# Patient Record
Sex: MALE | Race: WHITE | NOT HISPANIC OR LATINO | Employment: FULL TIME | URBAN - METROPOLITAN AREA
[De-identification: names, ages, dates, MRNs, and addresses within clinical notes are randomized per-mention and may not be internally consistent; named-entity substitution may affect disease eponyms.]

---

## 2017-04-03 ENCOUNTER — HISTORICAL (OUTPATIENT)
Dept: ADMINISTRATIVE | Facility: HOSPITAL | Age: 62
End: 2017-04-03

## 2017-12-07 ENCOUNTER — OFFICE VISIT (OUTPATIENT)
Dept: FAMILY MEDICINE | Facility: CLINIC | Age: 62
End: 2017-12-07
Payer: COMMERCIAL

## 2017-12-07 VITALS
RESPIRATION RATE: 16 BRPM | DIASTOLIC BLOOD PRESSURE: 70 MMHG | HEIGHT: 69 IN | SYSTOLIC BLOOD PRESSURE: 138 MMHG | HEART RATE: 70 BPM | WEIGHT: 302.19 LBS | BODY MASS INDEX: 44.76 KG/M2

## 2017-12-07 DIAGNOSIS — I10 ESSENTIAL (PRIMARY) HYPERTENSION: Primary | ICD-10-CM

## 2017-12-07 DIAGNOSIS — R73.9 HYPERGLYCEMIA: ICD-10-CM

## 2017-12-07 DIAGNOSIS — D69.6 THROMBOCYTOPENIA: ICD-10-CM

## 2017-12-07 DIAGNOSIS — R79.89 HIGH THYROID STIMULATING HORMONE (TSH) LEVEL: ICD-10-CM

## 2017-12-07 DIAGNOSIS — Z83.3 FH: DIABETES MELLITUS: ICD-10-CM

## 2017-12-07 DIAGNOSIS — G47.33 OBSTRUCTIVE SLEEP APNEA SYNDROME: ICD-10-CM

## 2017-12-07 DIAGNOSIS — E78.5 DYSLIPIDEMIA: ICD-10-CM

## 2017-12-07 PROBLEM — Z12.5 ENCOUNTER FOR SCREENING FOR MALIGNANT NEOPLASM OF PROSTATE: Status: ACTIVE | Noted: 2017-12-07

## 2017-12-07 PROBLEM — G47.30 SLEEP APNEA: Status: ACTIVE | Noted: 2017-12-07

## 2017-12-07 PROBLEM — R60.0 EDEMA OF LOWER EXTREMITY: Status: ACTIVE | Noted: 2017-12-07

## 2017-12-07 PROBLEM — Z78.9 NON-SMOKER: Status: ACTIVE | Noted: 2017-12-07

## 2017-12-07 PROBLEM — E66.01 MORBID OBESITY: Status: ACTIVE | Noted: 2017-12-07

## 2017-12-07 PROBLEM — R17 JAUNDICE: Status: ACTIVE | Noted: 2017-12-07

## 2017-12-07 PROBLEM — Z87.09 HISTORY OF RESPIRATORY SYSTEM DISEASE: Status: ACTIVE | Noted: 2017-12-07

## 2017-12-07 PROCEDURE — 99214 OFFICE O/P EST MOD 30 MIN: CPT | Mod: ,,, | Performed by: INTERNAL MEDICINE

## 2017-12-07 RX ORDER — LANOLIN ALCOHOL/MO/W.PET/CERES
1000 CREAM (GRAM) TOPICAL DAILY
COMMUNITY

## 2017-12-07 RX ORDER — AMLODIPINE AND BENAZEPRIL HYDROCHLORIDE 5; 20 MG/1; MG/1
5-20 CAPSULE ORAL
COMMUNITY
Start: 2017-03-14 | End: 2017-12-07 | Stop reason: SDUPTHER

## 2017-12-07 RX ORDER — SIMVASTATIN 40 MG/1
40 TABLET, FILM COATED ORAL DAILY
Qty: 90 TABLET | Refills: 1 | Status: SHIPPED | OUTPATIENT
Start: 2017-12-07 | End: 2018-08-16 | Stop reason: SDUPTHER

## 2017-12-07 RX ORDER — NAPROXEN SODIUM 220 MG/1
81 TABLET, FILM COATED ORAL DAILY
COMMUNITY
End: 2021-02-01

## 2017-12-07 RX ORDER — AMLODIPINE AND BENAZEPRIL HYDROCHLORIDE 5; 20 MG/1; MG/1
1 CAPSULE ORAL DAILY
Qty: 90 CAPSULE | Refills: 1 | Status: SHIPPED | OUTPATIENT
Start: 2017-12-07 | End: 2018-08-16 | Stop reason: SDUPTHER

## 2017-12-07 RX ORDER — SIMVASTATIN 40 MG/1
40 TABLET, FILM COATED ORAL DAILY
COMMUNITY
Start: 2017-03-14 | End: 2017-12-07 | Stop reason: SDUPTHER

## 2017-12-07 NOTE — PATIENT INSTRUCTIONS
A1C  Does this test have other names?  Hemoglobin A1c; HbA1c; glycosylated hemoglobin; glycohemoglobin; Glycated hemoglobin  What is this test?  A1C is a blood test used to screen people to find out whether they have diabetes or prediabetes. It's also used in people who know they have diabetes to measure how well they are controlling their blood sugar and to guide their treatment decisions over time.  Why do I need this test?  You may need this test to check for prediabetes or diabetes. If you already know that you have diabetes or prediabetes, you may need this test to see how well you are controlling your blood sugar.  People with diabetes must track their blood sugar (glucose) levels every day to make sure they arent too high or too low. The A1C test gives results for a longer period of time. It shows whether your blood sugar has been too high on average in the previous two to three months. When blood sugar is high, more glucose builds up and sticks to your hemoglobin, a protein that carries oxygen in red blood cells. The A1C test measures the percentage of hemoglobin that is coated with blood sugar.  Depending on the type of diabetes you have, how well it's controlled, and your health care providers preferences, you may need to have the A1C test two or more times a year. The American Diabetes Association (ADA) recommends that you have an A1C test at least twice a year if you are meeting your blood sugar goals and your blood sugar is well-controlled. If you arent meeting your goals or your medication has changed lately, you should have the A1C test more often. You also may have the test when your health care provider first starts working with you to treat your diabetes.  What other tests might I have along with this test?   If your health care provider is testing you for diabetes, you may also take a fasting plasma glucose test, or FPG, or an oral glucose tolerance test, or OGTT, as part of your screening  and diagnosis. You may also be tested for sugar, ketones or protein in the urine.  What do my test results mean?  Laboratory test results may vary depending on your age, gender, health history, the method used for the test, and many other factors. If your results are different from the results suggested below, this may not mean that you have a problem. Ask your health care provider to explain what the results mean for you.   A1C is reported as a percentage:  · Normal A1C is considered to be below 5.7 percent. Results between 5.7 and 6.4 percent may mean you have prediabetes. This means you have a higher risk of developing diabetes.  · Results of 6.5 percent or higher on two separate occasions may mean that you have diabetes.  · The ADA  recommends that people with diabetes should maintain an A1C below 7 percent. The American Association of Clinical Endocrinologists recommends an A1C of 6.5 percent or less. Recommendations may vary based on the person's age, medical conditions, or other factors.   How is this test done?  The test requires a blood sample, which is drawn through a needle from a vein in your arm.   Does this test pose any risks?  Taking a blood sample with a needle carries risks that include bleeding, infection, bruising, and a sense of lightheadedness. When the needle pricks your arm, you may feel a slight stinging sensation or pain. Afterward, the site may be slightly sore.  What might affect my test results?  Your blood sugar levels usually vary throughout the day. These variations won't affect the A1C.  If you have sickle cell anemia or other blood disorders, a standard A1C test may be less useful for diagnosing or monitoring diabetes. (Your health care provider may be able to find another diabetes test that will better serve you.) In addition, the test results may be skewed if you have anemia, heavy bleeding, an iron deficiency, kidney failure, or liver disease.  How do I get ready for this  test?  You don't need any special preparation for the test.    © 3961-9313 [employer]. [address] [place] [address], [place] [address]. All rights reserved. This information is not intended as a substitute for professional medical care. Always follow your healthcare professional's instructions.

## 2017-12-08 NOTE — PROGRESS NOTES
Subjective:       Patient ID: Marcus Parisi is a 62 y.o. male.    Chief Complaint: No chief complaint on file.    Marcus is a 62-year-old female who is here to establish with a new PCP who took over for his prior physician and he has a past medical history significant for hypertension, dyslipidemia, morbid obesity and does need refills on his blood pressure medication. He believes he has some whitecoat syndrome but his blood pressure is good today. He has lost about 15 pounds in the past 6 months just by eating better choices. He does have a family history for diabetes in his fasting blood sugar has been minimally elevated previous A1c in the year was normal. Patient also had an elevated TSH slightly above the normal range and this was not yet repeated. Patient does have obstructive sleep apnea as well.      Review of Systems   Constitutional: Negative for activity change, diaphoresis, fatigue and fever.   HENT: Negative for congestion, ear pain, postnasal drip, sinus pressure, sore throat and trouble swallowing.    Eyes: Negative for pain.   Respiratory: Negative for cough, chest tightness, shortness of breath and wheezing.    Cardiovascular: Negative for chest pain, palpitations and leg swelling.   Gastrointestinal: Negative for abdominal distention, abdominal pain, blood in stool, constipation and diarrhea.   Endocrine: Negative for cold intolerance and heat intolerance.   Genitourinary: Negative for decreased urine volume, difficulty urinating, dysuria, flank pain, frequency and hematuria.   Musculoskeletal: Negative for arthralgias, back pain, joint swelling, myalgias and neck pain.   Skin: Negative for pallor, rash and wound.   Neurological: Negative for tremors, syncope, weakness, light-headedness, numbness and headaches.   Hematological: Negative for adenopathy.   Psychiatric/Behavioral: Negative for confusion, decreased concentration, hallucinations, self-injury, sleep disturbance and suicidal ideas. The  patient is not nervous/anxious.        Past Medical History:   Diagnosis Date    Allergy     Hyperlipidemia     Hypertension        Past Surgical History:   Procedure Laterality Date    carpel tunnel      bilaterally       Family History   Problem Relation Age of Onset    Cancer Mother     Asthma Mother     Diabetes Mother     Heart disease Mother     Hypertension Mother     Hypertension Father     Crohn's disease Brother     Cancer Maternal Grandmother        Social History     Social History    Marital status:      Spouse name: N/A    Number of children: N/A    Years of education: N/A     Social History Main Topics    Smoking status: Former Smoker    Smokeless tobacco: Never Used      Comment: quit 40 yrs ago    Alcohol use Yes      Comment: occasional    Drug use: No    Sexual activity: Yes     Other Topics Concern    None     Social History Narrative    None       Current Outpatient Prescriptions   Medication Sig Dispense Refill    amlodipine-benazepril 5-20 mg (LOTREL) 5-20 mg per capsule Take 1 capsule by mouth once daily. 90 capsule 1    simvastatin (ZOCOR) 40 MG tablet Take 1 tablet (40 mg total) by mouth once daily. 90 tablet 1    aspirin 81 MG Chew Take by mouth.      cyanocobalamin (VITAMIN B-12) 100 MCG tablet Take by mouth.      OMEGA-3 ACID ETHYL ESTERS ORAL Take by mouth.       No current facility-administered medications for this visit.        Review of patient's allergies indicates:  No Known Allergies  Objective:      Physical Exam   Constitutional: He is oriented to person, place, and time. He appears well-developed and well-nourished. No distress.   HENT:   Head: Normocephalic and atraumatic.   Right Ear: External ear normal.   Left Ear: External ear normal.   Nose: Nose normal.   Mouth/Throat: Oropharynx is clear and moist.   Eyes: Conjunctivae and EOM are normal. Right eye exhibits no discharge. Left eye exhibits no discharge. No scleral icterus.   Neck: Normal  range of motion. Neck supple. No JVD present. No tracheal deviation present. No thyromegaly present.   Cardiovascular: Normal rate, regular rhythm, normal heart sounds and intact distal pulses.  Exam reveals no gallop.    No murmur heard.  Pulmonary/Chest: Effort normal and breath sounds normal. No respiratory distress. He has no wheezes. He has no rales.   Abdominal: Soft. Bowel sounds are normal. He exhibits no distension and no mass. There is no tenderness.   Musculoskeletal: Normal range of motion. He exhibits no edema or tenderness.   Lymphadenopathy:     He has no cervical adenopathy.   Neurological: He is alert and oriented to person, place, and time.   Skin: Skin is warm and dry. No rash noted. He is not diaphoretic. No erythema.   Psychiatric: He has a normal mood and affect. His behavior is normal. Judgment and thought content normal.     No results found for: WBC, HGB, HCT, PLT, CHOL, TRIG, HDL, LDLDIRECT, ALT, AST, NA, K, CL, CREATININE, BUN, CO2, TSH, PSA, INR, GLUF, HGBA1C, MICROALBUR  Assessment:       1. Essential (primary) hypertension    2. Dyslipidemia    3. High thyroid stimulating hormone (TSH) level    4. Hyperglycemia    5. Obstructive sleep apnea syndrome since 1995    6. Thrombocytopenia    7. FH: diabetes mellitus    8. BMI 40.0-44.9, adult        Plan:       Essential (primary) hypertension  -     Urinalysis Microscopic; Future; Expected date: 12/07/2017  -     amlodipine-benazepril 5-20 mg (LOTREL) 5-20 mg per capsule; Take 1 capsule by mouth once daily.  Dispense: 90 capsule; Refill: 1    Dyslipidemia  -     simvastatin (ZOCOR) 40 MG tablet; Take 1 tablet (40 mg total) by mouth once daily.  Dispense: 90 tablet; Refill: 1    High thyroid stimulating hormone (TSH) level  -     TSH; Future; Expected date: 12/07/2017  -     T4, free; Future; Expected date: 12/07/2017    Hyperglycemia  -     Hemoglobin A1c; Future; Expected date: 12/07/2017  -     Basic metabolic panel; Future; Expected date:  12/07/2017    Obstructive sleep apnea syndrome since 1995    Thrombocytopenia  -     CBC auto differential; Future; Expected date: 12/07/2017    FH: diabetes mellitus    BMI 40.0-44.9, adult

## 2017-12-11 RX ORDER — LEVOTHYROXINE SODIUM 25 UG/1
25 TABLET ORAL DAILY
Qty: 30 TABLET | Refills: 11 | Status: SHIPPED | OUTPATIENT
Start: 2017-12-11 | End: 2018-09-18 | Stop reason: SDUPTHER

## 2018-08-16 DIAGNOSIS — D69.6 THROMBOCYTOPENIA: Primary | ICD-10-CM

## 2018-08-16 DIAGNOSIS — I10 ESSENTIAL (PRIMARY) HYPERTENSION: ICD-10-CM

## 2018-08-16 DIAGNOSIS — E78.5 DYSLIPIDEMIA: ICD-10-CM

## 2018-08-16 DIAGNOSIS — Z12.5 SCREENING FOR PROSTATE CANCER: ICD-10-CM

## 2018-08-16 DIAGNOSIS — R73.9 HYPERGLYCEMIA: ICD-10-CM

## 2018-08-16 NOTE — TELEPHONE ENCOUNTER
Pt has appt with you on 9/11/2018.    Pt requested EXTERNAL LAB ORDERS & he will  here at the clinic. States it is cheaper to take them to a private lab???

## 2018-08-18 RX ORDER — SIMVASTATIN 40 MG/1
40 TABLET, FILM COATED ORAL DAILY
Qty: 90 TABLET | Refills: 0 | Status: SHIPPED | OUTPATIENT
Start: 2018-08-18 | End: 2018-09-18 | Stop reason: SDUPTHER

## 2018-08-18 RX ORDER — AMLODIPINE AND BENAZEPRIL HYDROCHLORIDE 5; 20 MG/1; MG/1
1 CAPSULE ORAL DAILY
Qty: 90 CAPSULE | Refills: 0 | Status: SHIPPED | OUTPATIENT
Start: 2018-08-18 | End: 2018-09-18 | Stop reason: SDUPTHER

## 2018-09-18 ENCOUNTER — OFFICE VISIT (OUTPATIENT)
Dept: FAMILY MEDICINE | Facility: CLINIC | Age: 63
End: 2018-09-18
Payer: COMMERCIAL

## 2018-09-18 VITALS
SYSTOLIC BLOOD PRESSURE: 130 MMHG | BODY MASS INDEX: 44.88 KG/M2 | RESPIRATION RATE: 16 BRPM | DIASTOLIC BLOOD PRESSURE: 68 MMHG | HEART RATE: 74 BPM | HEIGHT: 69 IN | WEIGHT: 303 LBS | OXYGEN SATURATION: 96 %

## 2018-09-18 DIAGNOSIS — Z83.3 FH: DIABETES MELLITUS: ICD-10-CM

## 2018-09-18 DIAGNOSIS — E78.5 DYSLIPIDEMIA: ICD-10-CM

## 2018-09-18 DIAGNOSIS — R79.89 HIGH THYROID STIMULATING HORMONE (TSH) LEVEL: ICD-10-CM

## 2018-09-18 DIAGNOSIS — I10 ESSENTIAL (PRIMARY) HYPERTENSION: Primary | ICD-10-CM

## 2018-09-18 PROCEDURE — 99213 OFFICE O/P EST LOW 20 MIN: CPT | Mod: ,,, | Performed by: INTERNAL MEDICINE

## 2018-09-18 RX ORDER — AMLODIPINE AND BENAZEPRIL HYDROCHLORIDE 5; 20 MG/1; MG/1
1 CAPSULE ORAL DAILY
Qty: 90 CAPSULE | Refills: 3 | Status: SHIPPED | OUTPATIENT
Start: 2018-09-18 | End: 2019-09-30 | Stop reason: SDUPTHER

## 2018-09-18 RX ORDER — SIMVASTATIN 40 MG/1
40 TABLET, FILM COATED ORAL DAILY
Qty: 90 TABLET | Refills: 3 | Status: SHIPPED | OUTPATIENT
Start: 2018-09-18 | End: 2019-09-30 | Stop reason: SDUPTHER

## 2018-09-18 RX ORDER — LEVOTHYROXINE SODIUM 25 UG/1
25 TABLET ORAL DAILY
Qty: 90 TABLET | Refills: 3 | Status: SHIPPED | OUTPATIENT
Start: 2018-09-18 | End: 2019-04-25

## 2018-09-18 RX ORDER — CHOLECALCIFEROL (VITAMIN D3) 25 MCG
1000 TABLET ORAL DAILY
COMMUNITY
End: 2021-10-27

## 2018-09-18 NOTE — PATIENT INSTRUCTIONS
A1C  Does this test have other names?  Hemoglobin A1c; HbA1c; glycosylated hemoglobin; glycohemoglobin; Glycated hemoglobin  What is this test?  A1C is a blood test used to screen people to find out whether they have diabetes or prediabetes. It's also used in people who know they have diabetes to measure how well they are controlling their blood sugar and to guide their treatment decisions over time.  Why do I need this test?  You may need this test to check for prediabetes or diabetes. If you already know that you have diabetes or prediabetes, you may need this test to see how well you are controlling your blood sugar.  People with diabetes must track their blood sugar (glucose) levels every day to make sure they arent too high or too low. The A1C test gives results for a longer period of time. It shows whether your blood sugar has been too high on average in the previous two to three months. When blood sugar is high, more glucose builds up and sticks to your hemoglobin, a protein that carries oxygen in red blood cells. The A1C test measures the percentage of hemoglobin that is coated with blood sugar.  Depending on the type of diabetes you have, how well it's controlled, and your health care providers preferences, you may need to have the A1C test two or more times a year. The American Diabetes Association (ADA) recommends that you have an A1C test at least twice a year if you are meeting your blood sugar goals and your blood sugar is well-controlled. If you arent meeting your goals or your medication has changed lately, you should have the A1C test more often. You also may have the test when your health care provider first starts working with you to treat your diabetes.  What other tests might I have along with this test?   If your health care provider is testing you for diabetes, you may also take a fasting plasma glucose test, or FPG, or an oral glucose tolerance test, or OGTT, as part of your screening  and diagnosis. You may also be tested for sugar, ketones or protein in the urine.  What do my test results mean?  Laboratory test results may vary depending on your age, gender, health history, the method used for the test, and many other factors. If your results are different from the results suggested below, this may not mean that you have a problem. Ask your health care provider to explain what the results mean for you.   A1C is reported as a percentage:  · Normal A1C is considered to be below 5.7 percent. Results between 5.7 and 6.4 percent may mean you have prediabetes. This means you have a higher risk of developing diabetes.  · Results of 6.5 percent or higher on two separate occasions may mean that you have diabetes.  · The ADA  recommends that people with diabetes should maintain an A1C below 7 percent. The American Association of Clinical Endocrinologists recommends an A1C of 6.5 percent or less. Recommendations may vary based on the person's age, medical conditions, or other factors.   How is this test done?  The test requires a blood sample, which is drawn through a needle from a vein in your arm.   Does this test pose any risks?  Taking a blood sample with a needle carries risks that include bleeding, infection, bruising, and a sense of lightheadedness. When the needle pricks your arm, you may feel a slight stinging sensation or pain. Afterward, the site may be slightly sore.  What might affect my test results?  Your blood sugar levels usually vary throughout the day. These variations won't affect the A1C.  If you have sickle cell anemia or other blood disorders, a standard A1C test may be less useful for diagnosing or monitoring diabetes. (Your health care provider may be able to find another diabetes test that will better serve you.) In addition, the test results may be skewed if you have anemia, heavy bleeding, an iron deficiency, kidney failure, or liver disease.  How do I get ready for this  test?  You don't need any special preparation for the test.    © 3612-5820 [employer]. [address] [place] [address], [place] [address]. All rights reserved. This information is not intended as a substitute for professional medical care. Always follow your healthcare professional's instructions.

## 2018-09-19 NOTE — PROGRESS NOTES
Subjective:       Patient ID: Marcus Parisi is a 63 y.o. male.    Chief Complaint: Hypertension (lab review)    62-year-old gentleman here for a follow-up on his chronic medical conditions to include essential hypertension, dyslipidemia and subclinical hypothyroidism with a mildly elevated TSH. He did to some recent blood work which was inclusive and everything was in normal limits; however they did not do the PSA. The patient does have a family history of hyperglycemia/diabetes in his sugar was 105 on fasting. His A1c however was 5.7.      Review of Systems   Constitutional: Negative for activity change, diaphoresis, fatigue and fever.   HENT: Negative for congestion, ear pain, postnasal drip, sinus pressure, sore throat and trouble swallowing.    Eyes: Negative for pain.   Respiratory: Negative for cough, chest tightness, shortness of breath and wheezing.    Cardiovascular: Negative for chest pain, palpitations and leg swelling.   Gastrointestinal: Negative for abdominal distention, abdominal pain, blood in stool, constipation and diarrhea.   Endocrine: Negative for cold intolerance and heat intolerance.   Genitourinary: Negative for decreased urine volume, difficulty urinating, dysuria, flank pain, frequency and hematuria.   Musculoskeletal: Negative for arthralgias, back pain, joint swelling, myalgias and neck pain.   Skin: Negative for pallor, rash and wound.   Neurological: Negative for tremors, syncope, weakness, light-headedness, numbness and headaches.   Hematological: Negative for adenopathy.   Psychiatric/Behavioral: Negative for confusion, decreased concentration, hallucinations, self-injury, sleep disturbance and suicidal ideas. The patient is not nervous/anxious.        Past Medical History:   Diagnosis Date    Allergy     Hyperlipidemia     Hypertension        Past Surgical History:   Procedure Laterality Date    carpel tunnel      bilaterally       Family History   Problem Relation Age of Onset     Cancer Mother     Asthma Mother     Diabetes Mother     Heart disease Mother     Hypertension Mother     Hypertension Father     Crohn's disease Brother     Cancer Maternal Grandmother        Social History     Socioeconomic History    Marital status:      Spouse name: None    Number of children: None    Years of education: None    Highest education level: None   Social Needs    Financial resource strain: None    Food insecurity - worry: None    Food insecurity - inability: None    Transportation needs - medical: None    Transportation needs - non-medical: None   Occupational History    None   Tobacco Use    Smoking status: Former Smoker    Smokeless tobacco: Never Used    Tobacco comment: quit 40 yrs ago   Substance and Sexual Activity    Alcohol use: Yes     Comment: occasional    Drug use: No    Sexual activity: Yes   Other Topics Concern    None   Social History Narrative    None       Current Outpatient Medications   Medication Sig Dispense Refill    amlodipine-benazepril 5-20 mg (LOTREL) 5-20 mg per capsule Take 1 capsule by mouth once daily. 90 capsule 3    aspirin 81 MG Chew Take by mouth.      cyanocobalamin (VITAMIN B-12) 100 MCG tablet Take by mouth.      levothyroxine (SYNTHROID) 25 MCG tablet Take 1 tablet (25 mcg total) by mouth once daily. 90 tablet 3    OMEGA-3 ACID ETHYL ESTERS ORAL Take by mouth.      simvastatin (ZOCOR) 40 MG tablet Take 1 tablet (40 mg total) by mouth once daily. 90 tablet 3    vitamin D (VITAMIN D3) 1000 units Tab Take 1,000 Units by mouth once daily.       No current facility-administered medications for this visit.        Review of patient's allergies indicates:  No Known Allergies  Objective:      Physical Exam   Constitutional: He is oriented to person, place, and time. He appears well-developed and well-nourished. No distress.   HENT:   Head: Normocephalic and atraumatic.   Right Ear: External ear normal.   Left Ear: External ear  normal.   Nose: Nose normal.   Mouth/Throat: Oropharynx is clear and moist.   Eyes: Conjunctivae and EOM are normal. Right eye exhibits no discharge. Left eye exhibits no discharge. No scleral icterus.   Neck: Normal range of motion. Neck supple. No JVD present. No tracheal deviation present. No thyromegaly present.   Cardiovascular: Normal rate, regular rhythm, normal heart sounds and intact distal pulses. Exam reveals no gallop.   No murmur heard.  Pulmonary/Chest: Effort normal and breath sounds normal. No respiratory distress. He has no wheezes. He has no rales.   Abdominal: Soft. Bowel sounds are normal. He exhibits no distension and no mass. There is no tenderness.   Musculoskeletal: Normal range of motion. He exhibits no edema or tenderness.   Lymphadenopathy:     He has no cervical adenopathy.   Neurological: He is alert and oriented to person, place, and time.   Skin: Skin is warm and dry. No rash noted. He is not diaphoretic. No erythema.   Psychiatric: He has a normal mood and affect. His behavior is normal. Judgment and thought content normal.       Assessment:       1. Essential (primary) hypertension    2. High thyroid stimulating hormone (TSH) level    3. Dyslipidemia    4. FH: diabetes mellitus -mom - AIC 5.7    5. BMI 40.0-44.9, adult        Plan:       Essential (primary) hypertension  -     amlodipine-benazepril 5-20 mg (LOTREL) 5-20 mg per capsule; Take 1 capsule by mouth once daily.  Dispense: 90 capsule; Refill: 3    High thyroid stimulating hormone (TSH) level  -     levothyroxine (SYNTHROID) 25 MCG tablet; Take 1 tablet (25 mcg total) by mouth once daily.  Dispense: 90 tablet; Refill: 3    Dyslipidemia  -     simvastatin (ZOCOR) 40 MG tablet; Take 1 tablet (40 mg total) by mouth once daily.  Dispense: 90 tablet; Refill: 3    FH: diabetes mellitus -mom - AIC 5.7    BMI 40.0-44.9, adult -Discussed with patient diet and exercise education to assist with weight loss

## 2018-09-24 ENCOUNTER — TELEPHONE (OUTPATIENT)
Dept: FAMILY MEDICINE | Facility: CLINIC | Age: 63
End: 2018-09-24

## 2018-09-24 DIAGNOSIS — R97.20 INCREASED PROSTATE SPECIFIC ANTIGEN (PSA) VELOCITY: Primary | ICD-10-CM

## 2018-09-25 NOTE — TELEPHONE ENCOUNTER
PSA WENT FROM 1.2 TO 2.1 WHICH IS AN INCREASE IN ONE YEAR     I WILL REFER HIM TO UROLOGY AS HE NEEDS TO HAVE A GOOD PROSTATE EXAM    TARIQ ESPINOZA

## 2018-10-09 ENCOUNTER — OFFICE VISIT (OUTPATIENT)
Dept: UROLOGY | Facility: CLINIC | Age: 63
End: 2018-10-09
Payer: COMMERCIAL

## 2018-10-09 VITALS
HEART RATE: 69 BPM | HEIGHT: 69 IN | TEMPERATURE: 99 F | DIASTOLIC BLOOD PRESSURE: 78 MMHG | SYSTOLIC BLOOD PRESSURE: 133 MMHG | BODY MASS INDEX: 45.94 KG/M2 | WEIGHT: 310.19 LBS

## 2018-10-09 DIAGNOSIS — R97.20 RISING PSA LEVEL: Primary | ICD-10-CM

## 2018-10-09 LAB
BILIRUB SERPL-MCNC: NORMAL MG/DL
BLOOD URINE, POC: NORMAL
COLOR, POC UA: YELLOW
GLUCOSE UR QL STRIP: NORMAL
KETONES UR QL STRIP: NORMAL
LEUKOCYTE ESTERASE URINE, POC: NORMAL
NITRITE, POC UA: NORMAL
PH, POC UA: 5
PROTEIN, POC: NORMAL
SPECIFIC GRAVITY, POC UA: 1.02
UROBILINOGEN, POC UA: NORMAL

## 2018-10-09 PROCEDURE — 99244 OFF/OP CNSLTJ NEW/EST MOD 40: CPT | Mod: 25,S$GLB,, | Performed by: UROLOGY

## 2018-10-09 PROCEDURE — 81002 URINALYSIS NONAUTO W/O SCOPE: CPT | Mod: S$GLB,,, | Performed by: UROLOGY

## 2018-10-09 PROCEDURE — 99999 PR PBB SHADOW E&M-NEW PATIENT-LVL III: CPT | Mod: PBBFAC,,, | Performed by: UROLOGY

## 2018-10-09 NOTE — PATIENT INSTRUCTIONS
His psa is only 2.1, however in 1+years it has risen from 1.2, which is doubled. On exam he has a 20g prostate so size, nor infection, nor inflammation account for his elevated psa. He has a somewhat firm prostate but no discrete nodules.  At this point I would prefer to repeat a psa, free and total in 3 months as well as a JAHAIRA and if his JAHAIRA is more abnormal, more firm, a discrete nodule palpated, psa is higher o %free psa is low then will proceed with biopsy.  Family and pt are amenable to this.    I have routed a letter back to   for the consult on date of service 10/818/.     F/u 3 months with psa, free and total and JAHAIRA

## 2018-10-09 NOTE — LETTER
October 9, 2018      Yaquelin Ya MD  [address]  [address]  [place] [address]           [place] [facility]  [address] [place] [address] [address]  [place] [address]  Phone: [phone]  Fax: [phone]          Patient: Marcus Parisi   MR Number: [id number]   YOB: 1955   Date of Visit: 10/9/2018       Dear Dr. Yaquelin Ya:    Thank you for referring Marcus Parisi to me for evaluation. Attached you will find relevant portions of my assessment and plan of care.    If you have questions, please do not hesitate to call me. I look forward to following Marcus Parisi along with you.    Sincerely,    Delmy Hernandez MD    Enclosure  CC:  No Recipients    If you would like to receive this communication electronically, please contact [email or web address] or [phone] to request more information on [employer] Link access.    For providers and/or their staff who would like to refer a patient to [facility], please contact us through our one-stop-shop provider referral line, [facility], at [phone].    If you feel you have received this communication in error or would no longer like to receive these types of communications, please e-mail [email or web address]

## 2018-10-09 NOTE — PROGRESS NOTES
"[facility] - [place]  Staff: MD Mary    Referring provider and please cc:   PCP: [facility]    Chief Complaint: rising psa     Subjective:        HPI: Marcus Parisi is a 63 y.o. male presents with     He was referred for rising psa from his pcp. psa last year 2017 was 1.2, up to 2.1 this year. Denies any uti or bph symptoms. No family hx of prostate cancer.     18 2.1  2017  1.2    AUA SSx: 2, pleased   IIEF: 24    ECOG Status: 0    Gross Hematuria:No  STDs in past: No  Vasectomy: no    REVIEW OF SYSTEMS:  General ROS: no fevers, no chills  Psychological ROS: no depression  Endocrine ROS: no heat or cold  Respiratory ROS: no SOB  Cardiovascular ROS: no CP  Gastrointestinal ROS: no abdominal pain, no constipation, non diarrhea, on BRBPR  Musculoskeletal ROS: no muscle pain  Neurological ROS: kapil headaches  Dermatological ROS: no rashes  HEENT: no glasses, no sinus   ROS: per HPI     PMHx:  Past Medical History:   Diagnosis Date    Allergy     Hyperlipidemia     Hypertension    hypothyroidism  Kidney stones: Yes - 10 years ago, 1 stone which required stone extraction , bad experience leaked from stent  Cataracts? none    PSHx:  Past Surgical History:   Procedure Laterality Date    carpel tunnel      bilaterally   stone extraction and stent    Stents/Valves/Foreign Bodies: No  Cardiac Evaluation: No    Screening Studies  Colonoscopy: last one was 5 years ago, due for one next year     Fam Hx:   malignancies: No  . Gyn malignancies: no. Mother had "bone cancer" and  2 months after diagnosis a 72. Father  a 84 from natural causes.    kidney stones: No     Soc Hx:  Former tobacco, quit 40+years ago.  3 pk per day x 10 year  occ alcohol  Lives in [place]  :yes here with wife Ann-Marie   Children: 2   Occupation:     Allergies:  Patient has no known allergies.    Medications: reviewed   Anticoagulation: Yes - asa 81mg "     Objective:     Vitals:    10/09/18 0954   BP: 133/78   Pulse: 69   Temp: 98.5 °F (36.9 °C)         General:WDWN in NAD  Eyes: PERRLA, normal conjunctiva  Respiratory: No increased work on breathing.   Cardiovascular: No obvious extremity edema. Warm and well perfused.   GI: palpation of masses. No tenderness. No hepatosplenomegaly to palpation.  Musculoskeletal: normal range of motion of bilateral upper extremities. Normal muscle strength and tone.  Skin: no obvious rashes or lesions. No tightening of skin noted.  Neurologic: CN grossly normal. Normal sensation.   Psychiatric: awake, alert and oriented x 3. Mood and affect normal. Cooperative.    :  Inspection of anus normal  No scrotal rashes, cysts or lesions  Epididymis normal in size, no tenderness  Testes normal and size, equal size bilaterally, no masses  Urethral meatus normal without discharge  Penis is circumcised, buried    JAHAIRA: 20g gland without any discrete masses, left side however more firm, no tenderness. SV not palpable. Normal sphincter tone. No hemhorroids.  No bilateral inguinal hernias noted       LABS REVIEW:  Urinalysis void: 1.020/5/remainder neg  Urine history: no other     No results found for: WBC, HGB, HCT, MCV, PLT  BMP  No results found for: NA, K, CL, CO2, BUN, CREATININE, CALCIUM, ANIONGAP, ESTGFRAFRICA, EGFRNONAA    No results found for: PSA  Testosterone: No results found for: TESTOSTERONE    PATHOLOGY REVIEW:  none    RADIOGRAPHIC REVIEW:  Us abdomen 4/3/17  The right kidney is normal in size and echotexture, without echogenic calculi  or hydronephrosis. There is no ascites.      Assessment:       1. Rising PSA level          Plan:     His psa is only 2.1, however in 1+years it has risen from 1.2, which is doubled. On exam he has a 20g prostate so size, nor infection, nor inflammation account for his elevated psa. He has a somewhat firm prostate but no discrete nodules.  At this point I would prefer to repeat a psa, free and  total in 3 months as well as a JAHAIRA and if his JAHAIRA is more abnormal, more firm, a discrete nodule palpated, psa is higher o %free psa is low then will proceed with biopsy.  Family and pt are amenable to this.    I have routed a letter back to   for the consult on date of service 10/818/.     F/u 3 months with psa, free and total and JAHAIRA     Delmy Hernandez MD

## 2019-01-14 ENCOUNTER — TELEPHONE (OUTPATIENT)
Dept: UROLOGY | Facility: CLINIC | Age: 64
End: 2019-01-14

## 2019-01-14 NOTE — TELEPHONE ENCOUNTER
----- Message from Eli Spencer sent at 1/14/2019  9:41 AM CST -----  Contact: wife Lisa [phone]  Patient's wife called  And asked  If he can have the PSA test done today she states [facility] has [facility] done at [facility] on [address] phone is  [phone] please place the orders in today. Please call the patient wife back to confirm.

## 2019-01-15 ENCOUNTER — OFFICE VISIT (OUTPATIENT)
Dept: UROLOGY | Facility: CLINIC | Age: 64
End: 2019-01-15
Payer: COMMERCIAL

## 2019-01-15 ENCOUNTER — APPOINTMENT (OUTPATIENT)
Dept: LAB | Facility: HOSPITAL | Age: 64
End: 2019-01-15
Attending: UROLOGY
Payer: COMMERCIAL

## 2019-01-15 ENCOUNTER — TELEPHONE (OUTPATIENT)
Dept: UROLOGY | Facility: CLINIC | Age: 64
End: 2019-01-15

## 2019-01-15 VITALS
HEART RATE: 79 BPM | BODY MASS INDEX: 46.09 KG/M2 | HEIGHT: 69 IN | DIASTOLIC BLOOD PRESSURE: 84 MMHG | WEIGHT: 311.19 LBS | SYSTOLIC BLOOD PRESSURE: 149 MMHG

## 2019-01-15 DIAGNOSIS — R97.20 RISING PSA LEVEL: Primary | ICD-10-CM

## 2019-01-15 DIAGNOSIS — R31.29 MICROHEMATURIA: ICD-10-CM

## 2019-01-15 DIAGNOSIS — N40.2 PROSTATE NODULE: ICD-10-CM

## 2019-01-15 LAB
AMORPH CRY URNS QL MICRO: NORMAL
BACTERIA #/AREA URNS HPF: NORMAL /HPF
MICROSCOPIC COMMENT: NORMAL
RBC #/AREA URNS HPF: 1 /HPF (ref 0–4)

## 2019-01-15 PROCEDURE — 81002 URINALYSIS NONAUTO W/O SCOPE: CPT | Mod: S$GLB,,, | Performed by: UROLOGY

## 2019-01-15 PROCEDURE — 99214 PR OFFICE/OUTPT VISIT, EST, LEVL IV, 30-39 MIN: ICD-10-PCS | Mod: 25,S$GLB,, | Performed by: UROLOGY

## 2019-01-15 PROCEDURE — 99999 PR PBB SHADOW E&M-EST. PATIENT-LVL IV: ICD-10-PCS | Mod: PBBFAC,,, | Performed by: UROLOGY

## 2019-01-15 PROCEDURE — 88112 CYTOPATH CELL ENHANCE TECH: CPT | Mod: 26,,, | Performed by: PATHOLOGY

## 2019-01-15 PROCEDURE — 88112 CYTOLOGY SPECIMEN-URINE: ICD-10-PCS | Mod: 26,,, | Performed by: PATHOLOGY

## 2019-01-15 PROCEDURE — 99999 PR PBB SHADOW E&M-EST. PATIENT-LVL IV: CPT | Mod: PBBFAC,,, | Performed by: UROLOGY

## 2019-01-15 PROCEDURE — 88112 CYTOPATH CELL ENHANCE TECH: CPT | Performed by: PATHOLOGY

## 2019-01-15 PROCEDURE — 99214 OFFICE O/P EST MOD 30 MIN: CPT | Mod: 25,S$GLB,, | Performed by: UROLOGY

## 2019-01-15 PROCEDURE — 81002 POCT URINE DIPSTICK WITHOUT MICROSCOPE: ICD-10-PCS | Mod: S$GLB,,, | Performed by: UROLOGY

## 2019-01-15 PROCEDURE — 81000 URINALYSIS NONAUTO W/SCOPE: CPT

## 2019-01-15 RX ORDER — CIPROFLOXACIN 500 MG/1
500 TABLET ORAL 2 TIMES DAILY
Qty: 3 TABLET | Refills: 0 | Status: SHIPPED | OUTPATIENT
Start: 2019-01-15 | End: 2019-01-17

## 2019-01-15 NOTE — TELEPHONE ENCOUNTER
----- Message from Antoinette Wynn sent at 1/15/2019  3:33 PM CST -----  Type: Needs Medical Advice    Who Called:  Lisa Parisi - spouse  Best Call Back Number: [phone]  Additional Information: spouse is requesting a return call concerning patient appointment today, and his weigh, to change insurance, please contact to discuss.     Thank you

## 2019-01-15 NOTE — PROGRESS NOTES
[facility] - [place]  Staff: MD Mary    Referring provider and please cc:   PCP: Dr[facility]    Chief Complaint: rising psa     Subjective:        HPI: Marcus Parisi is a 64 y.o. male presents with     Rising psa  He was referred for rising psa from his pcp. psa last year 9/2017 was 1.2, up to 2.1 this year. Denies any uti or bph symptoms. No family hx of prostate cancer. Has to have blood tests done at [facility]    01/14/19 2.9, %free 15.5, JAHAIRA: 20g, definite nodule on left apex and one on right apex.   10/9/18 JAHAIRA: 20g gland without any discrete masses, left side however more firm, no tenderness.  9/18/18 2.1  9/2017  1.2    AUA SSx: 2, pleased   IIEF: 24    Microhematuria and h/o kidney stones  No GH. On asa 81mg daily. Kidney stones: Yes - 10 years ago, 1 stone which required stone extraction , bad experience leaked from stent. Denies any flank pain.  +h/o 10 pack year hx. smoking (quit 40 years ago). Denies any oab.    Urinalysis void: tr blood-lysed   Urine history:   10/9/18 No cx, void: neg    ECOG Status: 0    Gross Hematuria:No  STDs in past: No  Vasectomy: no    REVIEW OF SYSTEMS:  General ROS: no fevers, no chills  Psychological ROS: no depression  Endocrine ROS: no heat or cold  Respiratory ROS: no SOB  Cardiovascular ROS: no CP  Gastrointestinal ROS: no abdominal pain, no constipation, non diarrhea, on BRBPR  Musculoskeletal ROS: no muscle pain  Neurological ROS: kapil headaches  Dermatological ROS: no rashes  HEENT: no glasses, no sinus   ROS: per HPI     PMHx:  Past Medical History:   Diagnosis Date    Allergy     Hyperlipidemia     Hypertension    hypothyroidism  Kidney stones: Yes - 10 years ago, 1 stone which required stone extraction , bad experience leaked from stent  Cataracts? none    PSHx:  Past Surgical History:   Procedure Laterality Date    carpel tunnel      bilaterally   stone extraction and  "stent    Stents/Valves/Foreign Bodies: No  Cardiac Evaluation: No    Screening Studies  Colonoscopy: last one was 5 years ago, due for one next year     Fam Hx:   malignancies: No  . Gyn malignancies: no. Mother had "bone cancer" and  2 months after diagnosis a 72. Father  a 84 from natural causes.    kidney stones: No     Soc Hx:  Former tobacco, quit 40+years ago.  3 pk per day x 10 year  occ alcohol  Lives in [place]  :yes here with wife Ann-Marie   Children: 2   Occupation:     Allergies:  Patient has no known allergies.    Medications: reviewed   Anticoagulation: Yes - asa 81mg     Objective:     Vitals:    01/15/19 1050   BP: (!) 149/84   Pulse: 79         General:WDWN in NAD  Eyes: PERRLA, normal conjunctiva  Respiratory: No increased work on breathing.   Cardiovascular: No obvious extremity edema. Warm and well perfused.   GI: palpation of masses. No tenderness. No hepatosplenomegaly to palpation.  Musculoskeletal: normal range of motion of bilateral upper extremities. Normal muscle strength and tone.  Skin: no obvious rashes or lesions. No tightening of skin noted.  Neurologic: CN grossly normal. Normal sensation.   Psychiatric: awake, alert and oriented x 3. Mood and affect normal. Cooperative.     10/9/18  Inspection of anus normal  No scrotal rashes, cysts or lesions  Epididymis normal in size, no tenderness  Testes normal and size, equal size bilaterally, no masses  Urethral meatus normal without discharge  Penis is circumcised, buried    JAHAIRA: 20g gland without any discrete masses, left side however more firm, no tenderness. SV not palpable. Normal sphincter tone. No hemhorroids.  No bilateral inguinal hernias noted     JAHAIRA today: see above      LABS REVIEW:      No results found for: WBC, HGB, HCT, MCV, PLT  BMP  No results found for: NA, K, CL, CO2, BUN, CREATININE, CALCIUM, ANIONGAP, ESTGFRAFRICA, EGFRNONAA    No results found for: PSA  Testosterone: No results found for: " TESTOSTERONE    PATHOLOGY REVIEW:  none    RADIOGRAPHIC REVIEW:  Us abdomen 4/3/17  The right kidney is normal in size and echotexture, without echogenic calculi  or hydronephrosis. There is no ascites.      Assessment:       1. Rising PSA level    2. Prostate nodule    3. Microhematuria          Plan:     Rising psa and prostate nodule  -psa only 2.9 but nodule felt today on left apex (0.5cm and right apex 0.25cm or smaller). Recommend proceeding with biopsy (and cysto). Scheduled for jan 28th  -urine sample 1 week beforehand  -cipro as directed. Gent 80mg im x 1 prior.   -fleets enema as directed  -discontinue aspirin    microehematuria  -send urine for ua micorsoscpic (may need imaging)  -send urine for cytology  -cysto at time of biopsy    Cc Dr.Mcelveen Delmy Hernandez MD

## 2019-01-15 NOTE — PATIENT INSTRUCTIONS
Rising psa and prostate nodule  -psa only 2.9 but nodule felt today on left apex (0.5cm and right apex 0.25cm or smaller). Recommend proceeding with biopsy (and cysto). Scheduled for jan 28th  -urine sample 1 week beforehand  -cipro as directed. Gent 80mg im x 1 prior.   -fleets enema as directed  -discontinue aspirin    microehematuria  -send urine for ua micorsoscpic (may need imaging)  -send urine for cytology  -cysto at time of biopsy    Cc

## 2019-01-15 NOTE — TELEPHONE ENCOUNTER
Spoke with patient's wife she states patient was advised at appointment today with  to try to get obamacare since insurance now is not a very good insurance. Wife found out obamacare is out of network with [facility]. Wife states she found another insurance [employer] PPO but max wait on insurance is 300lbs and patient weighs 311lbs. Wife wants wait adjusted in the computer to 300lbs so patient can get better insurance. Informed patient's wife this cannot be done it will be falsifying medical records. Wife verbally voiced understanding.

## 2019-01-16 LAB
BILIRUB SERPL-MCNC: ABNORMAL MG/DL
BLOOD URINE, POC: ABNORMAL
COLOR, POC UA: YELLOW
GLUCOSE UR QL STRIP: ABNORMAL
KETONES UR QL STRIP: ABNORMAL
LEUKOCYTE ESTERASE URINE, POC: ABNORMAL
NITRITE, POC UA: ABNORMAL
PH, POC UA: 7
PROTEIN, POC: ABNORMAL
SPECIFIC GRAVITY, POC UA: 1.02
UROBILINOGEN, POC UA: ABNORMAL

## 2019-01-21 ENCOUNTER — CLINICAL SUPPORT (OUTPATIENT)
Dept: UROLOGY | Facility: CLINIC | Age: 64
End: 2019-01-21
Payer: COMMERCIAL

## 2019-01-21 ENCOUNTER — APPOINTMENT (OUTPATIENT)
Dept: LAB | Facility: HOSPITAL | Age: 64
End: 2019-01-21
Attending: UROLOGY
Payer: COMMERCIAL

## 2019-01-21 DIAGNOSIS — R82.998 CELLS AND CASTS IN URINE: Primary | ICD-10-CM

## 2019-01-21 LAB
BILIRUB UR QL STRIP: NEGATIVE
CLARITY UR: CLEAR
COLOR UR: YELLOW
GLUCOSE UR QL STRIP: NEGATIVE
HGB UR QL STRIP: NEGATIVE
KETONES UR QL STRIP: NEGATIVE
LEUKOCYTE ESTERASE UR QL STRIP: NEGATIVE
NITRITE UR QL STRIP: NEGATIVE
PH UR STRIP: 7 [PH] (ref 5–8)
PROT UR QL STRIP: NEGATIVE
SP GR UR STRIP: 1.01 (ref 1–1.03)
URN SPEC COLLECT METH UR: NORMAL
UROBILINOGEN UR STRIP-ACNC: 1 EU/DL

## 2019-01-21 PROCEDURE — 99499 NO LOS: ICD-10-PCS | Mod: S$GLB,,, | Performed by: UROLOGY

## 2019-01-21 PROCEDURE — 99499 UNLISTED E&M SERVICE: CPT | Mod: S$GLB,,, | Performed by: UROLOGY

## 2019-01-21 PROCEDURE — 81003 URINALYSIS AUTO W/O SCOPE: CPT

## 2019-01-21 PROCEDURE — 87086 URINE CULTURE/COLONY COUNT: CPT

## 2019-01-23 LAB — BACTERIA UR CULT: NO GROWTH

## 2019-01-24 RX ORDER — GENTAMICIN SULFATE 80 MG/100ML
80 INJECTION, SOLUTION INTRAVENOUS
Status: COMPLETED | OUTPATIENT
Start: 2019-01-24 | End: 2020-07-30

## 2019-01-25 ENCOUNTER — PATIENT MESSAGE (OUTPATIENT)
Dept: SURGERY | Facility: AMBULARY SURGERY CENTER | Age: 64
End: 2019-01-25

## 2019-01-25 ENCOUNTER — TELEPHONE (OUTPATIENT)
Dept: UROLOGY | Facility: CLINIC | Age: 64
End: 2019-01-25

## 2019-01-28 ENCOUNTER — APPOINTMENT (OUTPATIENT)
Dept: LAB | Facility: HOSPITAL | Age: 64
End: 2019-01-28
Attending: UROLOGY
Payer: COMMERCIAL

## 2019-01-28 ENCOUNTER — HOSPITAL ENCOUNTER (OUTPATIENT)
Facility: AMBULARY SURGERY CENTER | Age: 64
Discharge: HOME | End: 2019-01-28
Attending: UROLOGY | Admitting: UROLOGY
Payer: COMMERCIAL

## 2019-01-28 ENCOUNTER — TELEPHONE (OUTPATIENT)
Dept: UROLOGY | Facility: CLINIC | Age: 64
End: 2019-01-28

## 2019-01-28 DIAGNOSIS — R31.29 MICROHEMATURIA: ICD-10-CM

## 2019-01-28 DIAGNOSIS — R97.20 RISING PSA LEVEL: ICD-10-CM

## 2019-01-28 DIAGNOSIS — N40.2 PROSTATE NODULE: ICD-10-CM

## 2019-01-28 LAB
BILIRUB SERPL-MCNC: NORMAL MG/DL
BLOOD URINE, POC: NORMAL
COLOR, POC UA: NORMAL
GLUCOSE UR QL STRIP: NORMAL
KETONES UR QL STRIP: NORMAL
LEUKOCYTE ESTERASE URINE, POC: NORMAL
NITRITE, POC UA: NORMAL
PH, POC UA: 7
PROTEIN, POC: NORMAL
SPECIFIC GRAVITY, POC UA: 1.01
UROBILINOGEN, POC UA: NORMAL

## 2019-01-28 PROCEDURE — 52000 CYSTOURETHROSCOPY: CPT | Mod: 59,,, | Performed by: UROLOGY

## 2019-01-28 PROCEDURE — 76942 ECHO GUIDE FOR BIOPSY: CPT | Mod: 26,59,, | Performed by: UROLOGY

## 2019-01-28 PROCEDURE — 88305 TISSUE EXAM BY PATHOLOGIST: CPT | Mod: 26,,, | Performed by: PATHOLOGY

## 2019-01-28 PROCEDURE — 76872 PR US TRANSRECTAL: ICD-10-PCS | Mod: 26,,, | Performed by: UROLOGY

## 2019-01-28 PROCEDURE — 52000 PR CYSTOURETHROSCOPY: ICD-10-PCS | Mod: 59,,, | Performed by: UROLOGY

## 2019-01-28 PROCEDURE — 52000 CYSTOURETHROSCOPY: CPT | Performed by: UROLOGY

## 2019-01-28 PROCEDURE — 76872 US TRANSRECTAL: CPT | Mod: 26,,, | Performed by: UROLOGY

## 2019-01-28 PROCEDURE — 88305 TISSUE SPECIMEN TO PATHOLOGY - SURGERY: ICD-10-PCS | Mod: 26,,, | Performed by: PATHOLOGY

## 2019-01-28 PROCEDURE — 55700 HC PROSTATE NEEDLE BIOPSY: CPT | Performed by: UROLOGY

## 2019-01-28 PROCEDURE — 76942 PR U/S GUIDANCE FOR NEEDLE GUIDANCE: ICD-10-PCS | Mod: 26,59,, | Performed by: UROLOGY

## 2019-01-28 PROCEDURE — 55700 PR BIOPSY OF PROSTATE,NEEDLE/PUNCH: ICD-10-PCS | Mod: ,,, | Performed by: UROLOGY

## 2019-01-28 PROCEDURE — 88305 TISSUE EXAM BY PATHOLOGIST: CPT | Performed by: PATHOLOGY

## 2019-01-28 PROCEDURE — 87086 URINE CULTURE/COLONY COUNT: CPT

## 2019-01-28 PROCEDURE — 76872 US TRANSRECTAL: CPT | Performed by: UROLOGY

## 2019-01-28 PROCEDURE — 55700 PR BIOPSY OF PROSTATE,NEEDLE/PUNCH: CPT | Mod: ,,, | Performed by: UROLOGY

## 2019-01-28 RX ORDER — LIDOCAINE HYDROCHLORIDE 10 MG/ML
INJECTION, SOLUTION EPIDURAL; INFILTRATION; INTRACAUDAL; PERINEURAL
Status: COMPLETED
Start: 2019-01-28 | End: 2019-01-28

## 2019-01-28 RX ORDER — GENTAMICIN SULFATE 40 MG/ML
80 INJECTION, SOLUTION INTRAMUSCULAR; INTRAVENOUS ONCE
Status: COMPLETED | OUTPATIENT
Start: 2019-01-28 | End: 2019-01-28

## 2019-01-28 RX ORDER — CIPROFLOXACIN 500 MG/1
500 TABLET ORAL
COMMUNITY
End: 2019-04-25

## 2019-01-28 RX ORDER — LIDOCAINE HYDROCHLORIDE 20 MG/ML
JELLY TOPICAL
Status: COMPLETED
Start: 2019-01-28 | End: 2019-01-28

## 2019-01-28 RX ORDER — LIDOCAINE HYDROCHLORIDE 20 MG/ML
JELLY TOPICAL
Status: DISCONTINUED | OUTPATIENT
Start: 2019-01-28 | End: 2019-01-28 | Stop reason: HOSPADM

## 2019-01-28 RX ADMIN — GENTAMICIN SULFATE 80 MG: 40 INJECTION, SOLUTION INTRAMUSCULAR; INTRAVENOUS at 02:01

## 2019-01-28 NOTE — OP NOTE
[facility] [place] Procedure Note - [facility]  Date: 01/28/2019    Procedures:Transrectal prostate ultrasound, ultrasound guided prostate biopsy  and Cystoscopy    Pre Procedure Diagnosis:rising psa, prostate nodules    Post Procedure Diagnosis: same, see below for findings    Surgeon: Delmy Hernandez MD    Indications: Marcus Parisi is a 64 y.o. male with BPH. Current PSA is 2.9. H&P. Urine from today reviewed and confirmed to have no infection.  reviewed. It was confirmed the pt took his antibiotics and did his fleet enemas as directed. The risks and benefits of both procedures were explained and consent was obtained.     Cytoscopic Specimen: urine sent for tori covarrubias sx   Prostate specimens:  Total number of cores taken: 15  Right base lateral- 1  Right base medial - 1  Right mid lateral - 1  Right mid medial - 1  Right apex lateral - 2  Right apex medial - 1  Right TZ - 1  Left base lateral - 1  Left base medial - 1  Left mid lateral - 1  Left mid medial - 1  Left apex lateral - 1  Left apex medial - 1  Left TZ - 1      Cystoscopy was performed   2% lidocaine urojet was used for local analgesia in the urethra.  The genitalia was prepped and draped in the sterile fashion with betadine.    The flexible scope was advanced into the urethra and into the bladder.  Bilateral ureteral orifice were evaluated and noted to be normal with clear efflux.  The bladder was completely surveyed in a systematic fashion and the cytoscope was retroflexed.  Cystoscopy findings as listed below.     Transrectal ultrasound and prostate biopsy was performed   It had been confirmed that he had taken his pre-op antibiotics and fleets enemas as directed. Also it was noted whether or not he was on anti-coagulation and if he had stopped. 2% urojet was placed into the rectum by the nurses and allowed to sit for 10 minutes.  A transrectal ultrasound probe was introduced and the triangle between the seminal vescical and the base of the  prostate was identified. 5cc of 2%lidocaine was used to perform a andrea-prostatic block on the right and then another 5cc was placed in the same location on the left side.  Prostate biopsy was then performed. See above for prostate specimen count. The probe was then removed and it was confirmed he had no significant bleeding.     The patient tolerated the procedures well without complication.    Findings: (pictures were  uploaded into media)  Cystoscopic Urethra findings - normal  Cystoscopic Prostate findings - minimal lobe hypertrophy, no median lobe  Cystoscopic Bladder findings - no  trabeculations, no diverticulum  Other Cysoscopic findings: none  No Bladder biopsy    Prostate Ultrasound findings:   · Seminal vesicles/Ejaculatory Ducts: Normal  · Outline/Symmetry of Prostate: WNL  · Central Gland/Transition Zone: Well demarcated  · Peripheral Zone: WNL    Prostate Measurements:   · Height:  30.6 mm  · Width:  46.1 mm  · Length:  54.8 mm  · TRUS Volume: 40.5 cm3  · Intravesical protrusion: none  · PSAD: 0.07    Assessment: Marcus Parisi is a 64 y.o. male with rising psa (2.9 from 1.2) also has nodules on b apex here today for prostate biopsy.   Plan:  F/u in 2 weeks to discuss results  Finish abx  Counseled when to return to ER    If he has + biopsy will need to discuss with community physician, may be more affordable for pt    Delmy Hernandez MD

## 2019-01-28 NOTE — DISCHARGE SUMMARY
[facility]  Urology  Discharge Note - Short Stay      Patient Name: Marcus Parisi  MRN: [id number]  Discharge Date and Time:  01/28/2019 4:41 PM  Attending Physician: Delmy Hernandez,*   Discharging Provider: Delmy Hernandez MD  Primary Care Physician: Yaquelin Pulido MD    Final Active Diagnoses:    Diagnosis Date Noted POA    Rising PSA level [R97.20] 01/15/2019 Yes      Problems Resolved During this Admission:       Final Diagnoses: Same as principal problem.    Hospital Course: Patient was admitted for an outpatient procedure and tolerated the procedure well with no complications.*    Procedure(s) (LRB):  BIOPSY, PROSTATE, RECTAL APPROACH, WITH US GUIDANCE (N/A)  CYSTOSCOPY (N/A)     Indwelling Lines/Drains at time of discharge:   Lines/Drains/Airways          None          Discharged Condition: good    Disposition: home    Follow Up:      Patient Instructions:   No discharge procedures on file.    Medications:  Reconciled Home Medications:      Medication List      CONTINUE taking these medications    amlodipine-benazepril 5-20 mg 5-20 mg per capsule  Commonly known as:  LOTREL  Take 1 capsule by mouth once daily.     aspirin 81 MG Chew  Take by mouth.     ciprofloxacin HCl 500 MG tablet  Commonly known as:  CIPRO  Take 500 mg by mouth every 12 (twelve) hours.     cyanocobalamin 100 MCG tablet  Commonly known as:  VITAMIN B-12  Take by mouth.     FLEET BISACODYL 10 mg/30 mL Enem  Generic drug:  bisacodyl  Place 10 mg rectally once.     levothyroxine 25 MCG tablet  Commonly known as:  SYNTHROID  Take 1 tablet (25 mcg total) by mouth once daily.     OMEGA-3 ACID ETHYL ESTERS ORAL  Take by mouth.     simvastatin 40 MG tablet  Commonly known as:  ZOCOR  Take 1 tablet (40 mg total) by mouth once daily.     vitamin D 1000 units Tab  Commonly known as:  VITAMIN D3  Take 1,000 Units by mouth once daily.            Discharge Procedure Orders (must include Diet, Follow-up, Activity):  No  discharge procedures on file.         Delmy Hernandez MD  Urology  [facility]

## 2019-01-28 NOTE — PLAN OF CARE
Pt states ready to go home , stable,. Pt c/o slight burning. Ambulated to car accompanied by spouse.

## 2019-01-28 NOTE — DISCHARGE INSTRUCTIONS
After your prostate biopsy    Avoid sexual activity,lifting, strenuous physical activity or exertion for 3 days     No riding mowers, tractors, bicycles, motorcycles for 2-3 weeks    You may experience blood in your urine or stool for up to 2 weeks and in your semen for up to 6 weeks.  This is a normal side effect of the procedure and will resolve.    Drink plenty of water    Take antibiotics as prescribed    If you experience any of the following conditions, please return immediately to the clinic (during office hrs) or the Emergency Room if after hours:       Fever       Inabiltiy to urinate       Severe bleeding    You may resume aspirin, anti inflammatory and blood thinners in 3 days    Results take at minimum 10 business days.  A 2 week follow up will be scheduled to review pathology reports in the clinic    During office hours, please call  and ask to speak with the nurse if you have any questions.  If after hours, call the [facility] to be connectied t o the doctor on call  Cystoscopy    Cystoscopy is a procedure that lets your doctor look directly inside your urethra and bladder. It can be used to:  · Help diagnose a problem with your urethra, bladder, or kidneys.  · Take a sample (biopsy) of bladder or urethral tissue.  · Treat certain problems (such as removing kidney stones).  · Place a stent to bypass an obstruction.  · Take special X-rays of the kidneys.  Based on the findings, your doctor may recommend other tests or treatments.  What is a cystoscope?  A cystoscope is a telescope-like instrument that contains lenses and fiberoptics (small glass wires that make bright light). The cystoscope may be straight and rigid, or flexible to bend around curves in the urethra. The doctor may look directly into the cystoscope, or project the image onto a monitor.  Getting ready  · Ask your doctor if you should stop taking any medicines before the procedure.  · Ask whether you should avoid eating  or drinking anything after midnight before the procedure.  · Follow any other instructions your doctor gives you.  Tell your doctor before the exam if you:  · Take any medicines, such as aspirin or blood thinners  · Have allergies to any medicines  · Are pregnant   The procedure  Cystoscopy is done in the doctors office, surgery center, or hospital. The doctor and a nurse are present during the procedure. It takes only a few minutes, longer if a biopsy, X-ray, or treatment needs to be done.  During the procedure:  · You lie on an exam table on your back, knees bent and legs apart. You are covered with a drape.  · Your urethra and the area around it are washed. Anesthetic jelly may be applied to numb the urethra. Other pain medicine is usually not needed. In some cases, you may be offered a mild sedative to help you relax. If a more extensive procedure is to be done, such as a biopsy or kidney stone removal, general anesthesia may be needed.  · The cystoscope is inserted. A sterile fluid is put into the bladder to expand it. You may feel pressure from this fluid.  · When the procedure is done, the cystoscope is removed.  After the procedure  If you had a sedative, general anesthesia, or spinal anesthesia, you must have someone drive you home. Once youre home:  · Drink plenty of fluids.  · You may have burning or light bleeding when you urinate--this is normal.  · Medicines may be prescribed to ease any discomfort or prevent infection. Take these as directed.  · Call your doctor if you have heavy bleeding or blood clots, burning that lasts more than a day, a fever over 100°F  (38° C), or trouble urinating.  Date Last Reviewed: 1/1/2017  © 2731-1293 [employer]. [address] [place] [address], [place] [address]. All rights reserved. This information is not intended as a substitute for professional medical care. Always follow your healthcare professional's instructions.

## 2019-01-28 NOTE — H&P
[facility] Note - [place]  Staff: MD Mary     Referring provider and please cc:   PCP: Dr[facility]     Chief Complaint: rising psa      Subjective:        HPI: Marcus Parisi is a 64 y.o. male presents with      Rising psa and prostate nodules, B apex   He was referred for rising psa from his pcp. psa last year 9/2017 was 1.2, up to 2.1 this year. Denies any uti or bph symptoms. No family hx of prostate cancer. Has to have blood tests done at [facility]     01/14/19          2.9, %free 15.5, JAHAIRA: 20g, definite nodule on left apex and one on right apex.   10/9/18            JAHAIRA: 20g gland without any discrete masses, left side however more firm, no tenderness.  9/18/18            2.1  9/2017             1.2     AUA SSx: 2, pleased   IIEF: 24     Microhematuria and h/o kidney stones  No GH. On asa 81mg daily. Kidney stones: Yes - 10 years ago, 1 stone which required stone extraction , bad experience leaked from stent. Denies any flank pain.  +h/o 10 pack year hx. smoking (quit 40 years ago). Denies any oab.     Urinalysis void: tr wbc   Urine history:   1/21/19 No cx, void: neg  1/15/19 No cx, void: tr blood  10/9/18            No cx, void: neg     ECOG Status: 0     Gross Hematuria:No  STDs in past: No  Vasectomy: no     REVIEW OF SYSTEMS:  General ROS: no fevers, no chills  Psychological ROS: no depression  Endocrine ROS: no heat or cold  Respiratory ROS: no SOB  Cardiovascular ROS: no CP  Gastrointestinal ROS: no abdominal pain, no constipation, non diarrhea, on BRBPR  Musculoskeletal ROS: no muscle pain  Neurological ROS: kapil headaches  Dermatological ROS: no rashes  HEENT: no glasses, no sinus   ROS: per HPI     PMHx:       Past Medical History:   Diagnosis Date    Allergy      Hyperlipidemia      Hypertension     hypothyroidism  Kidney stones: Yes - 10 years ago, 1 stone which required stone extraction , bad experience leaked from  "stent  Cataracts? none     PSHx:        Past Surgical History:   Procedure Laterality Date    carpel tunnel         bilaterally   stone extraction and stent     Stents/Valves/Foreign Bodies: No  Cardiac Evaluation: No     Screening Studies  Colonoscopy: last one was 5 years ago, due for one next year      Fam Hx:   malignancies: No  . Gyn malignancies: no. Mother had "bone cancer" and  2 months after diagnosis a 72. Father  a 84 from natural causes.    kidney stones: No      Soc Hx:  Former tobacco, quit 40+years ago.  3 pk per day x 10 year  occ alcohol  Lives in [place]  :yes here with wife Ann-Marie   Children: 2   Occupation:      Allergies:  Patient has no known allergies.     Medications: reviewed   Anticoagulation: Yes - asa 81mg      Objective:      Vitals:    19 1443   BP: (!) 140/76   Pulse: 65   Resp: 18   Temp: 98.7 °F (37.1 °C)     s  General:WDWN in NAD  Eyes: PERRLA, normal conjunctiva  Respiratory: No increased work on breathing.   Cardiovascular: No obvious extremity edema. Warm and well perfused.   GI: palpation of masses. No tenderness. No hepatosplenomegaly to palpation.  Musculoskeletal: normal range of motion of bilateral upper extremities. Normal muscle strength and tone.  Skin: no obvious rashes or lesions. No tightening of skin noted.  Neurologic: CN grossly normal. Normal sensation.   Psychiatric: awake, alert and oriented x 3. Mood and affect normal. Cooperative.      10/9/18  Inspection of anus normal  No scrotal rashes, cysts or lesions  Epididymis normal in size, no tenderness  Testes normal and size, equal size bilaterally, no masses  Urethral meatus normal without discharge  Penis is circumcised, buried    JAHAIRA: 20g gland without any discrete masses, left side however more firm, no tenderness. SV not palpable. Normal sphincter tone. No hemhorroids.  No bilateral inguinal hernias noted      JAHAIRA : see above        LABS REVIEW:        PATHOLOGY " REVIEW:  none     RADIOGRAPHIC REVIEW:  Us abdomen 4/3/17  The right kidney is normal in size and echotexture, without echogenic calculi  or hydronephrosis. There is no ascites.        Assessment:       1. Rising PSA level    2. Prostate nodule    3. Microhematuria           Plan:      Rising psa and prostate nodules  -psa only 2.9 but nodule felt today on left apex (0.5cm) and right apex 0.25cm or smaller). Recommend proceeding with biopsy (and cysto). Scheduled for jan 28th  -gent octor  -he has an insurance that only pays for doctor visits and I did offer him the possiblity of seeing a partner in the community who may be able to work with him on a payment plan that would be more affordable but he wants to stay here. He was given a quote and paid this amount but I explained that he may be responsible for more if they charge after the procedure today. He understands this and wants to proceed.      microehematuria  -send urine for ua micorsoscpic (may need imaging)  -send urine for cytology  -cysto at time of biopsy     Cc Dr.Mcelveen Delmy Hernandez MD

## 2019-01-29 ENCOUNTER — TELEPHONE (OUTPATIENT)
Dept: UROLOGY | Facility: CLINIC | Age: 64
End: 2019-01-29

## 2019-01-29 VITALS
HEART RATE: 68 BPM | OXYGEN SATURATION: 98 % | WEIGHT: 311.31 LBS | SYSTOLIC BLOOD PRESSURE: 158 MMHG | DIASTOLIC BLOOD PRESSURE: 89 MMHG | TEMPERATURE: 99 F | RESPIRATION RATE: 18 BRPM | HEIGHT: 69 IN | BODY MASS INDEX: 46.11 KG/M2

## 2019-01-29 NOTE — TELEPHONE ENCOUNTER
Patient wife in clinic today seen by dentist this morning with a swollen mouth and tooth infection. Was given Norco and Amoxicillin by the dentist this morning. Wife wants to make the doctor aware since patient had procedure yesterday. No problems related to procedure

## 2019-01-30 LAB — BACTERIA UR CULT: NO GROWTH

## 2019-02-08 ENCOUNTER — TELEPHONE (OUTPATIENT)
Dept: UROLOGY | Facility: CLINIC | Age: 64
End: 2019-02-08

## 2019-02-08 NOTE — TELEPHONE ENCOUNTER
1/28/19 psa 2.9, trus vol: 40.5g, T2c (b palp nodules), Gl 3+3 in RBM, ML, LONDONO, LTZ (4/14).  HGPIN in  RBL, RBM (2/14). ASAP in [facility] (1/14)    Will discuss at f/u:   We reviewed his pathology. He has Maricarmen 3+3, stage T2b  We reviewed his risks. His risk category is: *  We discussed all treatments and their associated risks and benefits to include active surveillance, brachytherapy, external beam radiation, prostatectomy robotic and open, cryotherapy and hormone ablation. The patient had the opportunity to ask questions.    His numbers were plugged into the [facility] nomogram (using Gl3+3 and HGPIN for 6/14) His likelihood with radical prostatectomy of  cancer specific survival is 99 % at 10 and 15 years, progression free survival is 99 %at 5 years and 91 % t 10 years. He risks of organ confined disease 51 %, extracapsular extension 47 %, seminal vesicle involvement 2 %, lymph node involvement  2 %.      He was given a copy of [facility] prostate cancer booklet today   He will f/u with *  He knows to contact them if he does not hear from them or us      FINAL PATHOLOGIC DIAGNOSIS  1. 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  2. PROSTATE, RIGHT BASE MEDIAL, BIOPSY:  -Prostatic adenocarcinoma, Maricarmen score 3+3=6.  -Tumor occupies 10% of the tissue submitted (1 of 1 core involved).  -High grade prostatic intraepithelial neoplasia is present.  3. PROSTATE, RIGHT TRANSITION ZONE, BIOPSY:  -Benign prostatic tissue.  4. PROSTATE, RIGHT MID LATERAL, BIOPSY:  -Benign prostatic tissue with atrophy.  5. PROSTATE, RIGHT MID MEDIAL, BIOPSY:  -Benign prostatic tissue with atrophy.  6. PROSTATE, RIGHT APEX LATERAL, BIOPSY:  -Benign prostatic tissue with atrophy.  7. PROSTATE, RIGHT APEX MEDIAL, BIOPSY:  -Benign prostatic tissue with atrophy.  8. PROSTATE, LEFT BASE LATERAL, BIOPSY:  -Benign prostatic tissue with atrophy.  9. PROSTATE, LEFT BASE MEDIAL, BIOPSY:  -Benign prostatic  tissue.  10. PROSTATE, LEFT MID LATERAL, BIOPSY:  -Prostatic adenocarcinoma, Maricarmen score 3+3=6.  -Tumor occupies 5-10% of the tissue submitted (1 of 1 core involved).  11. PROSTATE, LEFT MID MEDIAL, BIOPSY:  -Benign prostatic tissue.  12. PROSTATE, LEFT TRANSITION ZONE, BIOPSY:  -Prostatic adenocarcinoma, [place] score 3+3=6.  -Tumor occupies 50-60% of the tissue submitted (1 of 1 core involved).  13. PROSTATE, LEFT APEX LATERAL, BIOPSY:  -Atypical small acinar proliferation (ASAP).  14. PROSTATE, LEFT APEX MEDIAL, BIOPSY:  -Prostatic adenocarcinoma, Maricarmen score 3+3=6.  -Tumor occupies less than 5% of the tissue submitted (1 of 1 core involved).

## 2019-02-11 ENCOUNTER — OFFICE VISIT (OUTPATIENT)
Dept: UROLOGY | Facility: CLINIC | Age: 64
End: 2019-02-11
Payer: COMMERCIAL

## 2019-02-11 VITALS
WEIGHT: 305.31 LBS | HEIGHT: 69 IN | SYSTOLIC BLOOD PRESSURE: 134 MMHG | BODY MASS INDEX: 45.22 KG/M2 | DIASTOLIC BLOOD PRESSURE: 85 MMHG | HEART RATE: 70 BPM

## 2019-02-11 DIAGNOSIS — R97.20 RISING PSA LEVEL: Primary | ICD-10-CM

## 2019-02-11 DIAGNOSIS — C61 PROSTATE CANCER: ICD-10-CM

## 2019-02-11 DIAGNOSIS — R31.29 MICROSCOPIC HEMATURIA: ICD-10-CM

## 2019-02-11 PROCEDURE — 99215 PR OFFICE/OUTPT VISIT, EST, LEVL V, 40-54 MIN: ICD-10-PCS | Mod: S$GLB,,, | Performed by: UROLOGY

## 2019-02-11 PROCEDURE — 99999 PR PBB SHADOW E&M-EST. PATIENT-LVL III: ICD-10-PCS | Mod: PBBFAC,,, | Performed by: UROLOGY

## 2019-02-11 PROCEDURE — 99215 OFFICE O/P EST HI 40 MIN: CPT | Mod: S$GLB,,, | Performed by: UROLOGY

## 2019-02-11 PROCEDURE — 99999 PR PBB SHADOW E&M-EST. PATIENT-LVL III: CPT | Mod: PBBFAC,,, | Performed by: UROLOGY

## 2019-02-11 NOTE — Clinical Note
Needs radiation oncology f/u to discuss radiation treatmentCan see anyone (next 2 -3 weeeks preferable if possible

## 2019-02-11 NOTE — PATIENT INSTRUCTIONS
Please fax records to the fax numbers below and give the patient the following information. They will need to contact the office to confirm apointment    Josse Gold MD   [facility]  At [facility] on Tuesdays  [address] [place] [address]  [phone] (work)  [phone] (fax)    Private Office  [address]  [place] [address]  [phone] (work)  [phone] (fax)    [facility] Patient Referral   Phone: [phone]  Fax: [phone]    Options for surgical treatment:  Dr.Josh Alla Wallace or [facility] at [facility]  Dr.Michael Nelson at [facility]    Prostate cancer, T2c Gl3+3, T2c psa 2.9, NxMx  We reviewed his pathology. He has Maricarmen 3+3, stage T2c  We reviewed his risks. His risk category is: intermediate   We discussed all treatments and their associated risks and benefits to include active surveillance, brachytherapy, external beam radiation, prostatectomy robotic and open, cryotherapy and hormone ablation. The patient had the opportunity to ask questions.    His numbers were plugged into the [facility]K nomogram (using Gl3+3 and HGPIN for 6/14) His likelihood with radical prostatectomy of  cancer specific survival is 99 % at 10 and 15 years, progression free survival is 99 %at 5 years and 91 % t 10 years. He risks of organ confined disease 51 %, extracapsular extension 47 %, seminal vesicle involvement 2 %, lymph node involvement  2 %.  No further imaging recommended per nccn guideliens with <10%chance of spread to LN and psa <10     He was given a copy of [facility] prostate cancer booklet today   He will f/u with radiation oncology and robotic surgeon to discuss plan.  If there is any possiblity he could wait until Jan 1,2020, would be best but with his nodules and T2c disease, not sure I recommend this, at minimum would need a prostate MRI to ensure no obvious lesions extending outside prostate.  I have given him info for  as well as [facility] but he  will aslo contact his insurance and see if he could see surgeon and have surgery done here.   He knows to contact them if he does not hear from them or us      Microhematuria  -cytology negative  -blood expected after bx  -may need further imaging if persists, does have h/o stones     Cc

## 2019-02-11 NOTE — PROGRESS NOTES
[facility] Note - [place]  Staff: MD Mary     Referring provider and please cc:   PCP: Dr[facility]celveen [facility]:active     Chief Complaint: prostate cancer     Subjective:        HPI: Marcus Parisi is a 64 y.o. male presents with      Prostate cancer, T2c Gl3+3, T2c psa 2.9, NxMx prostate nodules, B apex   He was referred for rising psa from his pcp. psa last year 9/2017 was 1.2, up to 2.1 this year. Denies any uti or bph symptoms. No family hx of prostate cancer. [facility]   Underwent prostate biopsy, no complications after. See below for results. Here to discuss    psa history   1/28/19 psa 2.9, trus vol: 40.5g, T2c (b palp nodules), Gl 3+3 in RBM, LML, LONDONO, LTZ (4/14).  HGPIN in  RBL, RBM (2/14). ASAP in [facility] (1/14)  01/14/19          2.9, %free 15.5, JAHAIRA: 20g, definite nodule on left apex and one on right apex.   10/9/18            JAHAIRA: 20g gland without any discrete masses, left side however more firm, no tenderness.  9/18/18            2.1  9/2017             1.2     AUA SSx: 2, pleased   IIEF: 24     Microhematuria and h/o kidney stones  No GH. On asa 81mg daily. Kidney stones: Yes - 10 years ago, 1 stone which required stone extraction , bad experience leaked from stent. Denies any flank pain.  +h/o 10 pack year hx. smoking (quit 40 years ago). Denies any oab.  -us abd 4/2017 : right kidney normal     Urinalysis void: sml blood (had biopsy)  Urine history:   1/28/19 Ng, void: tr wbc- no sx of uti  1/21/19 ng, void: neg  1/15/19 No cx, void: tr blood, cytology: negative  10/9/18            No cx, void: neg     ECOG Status: 0     Gross Hematuria:No  STDs in past: No  Vasectomy: no     REVIEW OF SYSTEMS:  General ROS: no fevers, no chills  Psychological ROS: no depression  Endocrine ROS: no heat or cold  Respiratory ROS: no SOB  Cardiovascular ROS: no CP  Gastrointestinal ROS: no abdominal pain, no constipation, non diarrhea, on  "BRBPR  Musculoskeletal ROS: no muscle pain  Neurological ROS: kapil headaches  Dermatological ROS: no rashes  HEENT: no glasses, no sinus   ROS: per HPI     PMHx:       Past Medical History:   Diagnosis Date    Allergy      Hyperlipidemia      Hypertension     hypothyroidism  Kidney stones: Yes - 10 years ago, 1 stone which required stone extraction , bad experience leaked from stent  Cataracts? none     PSHx:        Past Surgical History:   Procedure Laterality Date    carpel tunnel         bilaterally   stone extraction and stent     Stents/Valves/Foreign Bodies: No  Cardiac Evaluation: No     Screening Studies  Colonoscopy: last one was 5 years ago, due for one next year      Fam Hx:   malignancies: No  . Gyn malignancies: no. Mother had "bone cancer" and  2 months after diagnosis a 72. Father  a 84 from natural causes.    kidney stones: No      Soc Hx:  Former tobacco, quit 40+years ago.  3 pk per day x 10 year  occ alcohol  Lives in [place]  :yes here with wife Ann-Marie   Children: 2   Occupation:      Allergies:  Patient has no known allergies.     Medications: reviewed   Anticoagulation: Yes - asa 81mg      Objective:      Vitals:    19 1150   BP: 134/85   Pulse: 70     s  General:WDWN in NAD  Eyes: PERRLA, normal conjunctiva  Respiratory: No increased work on breathing.   Cardiovascular: No obvious extremity edema. Warm and well perfused.   GI: palpation of masses. No tenderness. No hepatosplenomegaly to palpation.  Musculoskeletal: normal range of motion of bilateral upper extremities. Normal muscle strength and tone.  Skin: no obvious rashes or lesions. No tightening of skin noted.  Neurologic: CN grossly normal. Normal sensation.   Psychiatric: awake, alert and oriented x 3. Mood and affect normal. Cooperative.      10/9/18  Inspection of anus normal  No scrotal rashes, cysts or lesions  Epididymis normal in size, no tenderness  Testes normal and size, equal size " bilaterally, no masses  Urethral meatus normal without discharge  Penis is circumcised, buried    JAHAIRA: 20g gland without any discrete masses, left side however more firm, no tenderness. SV not palpable. Normal sphincter tone. No hemhorroids.  No bilateral inguinal hernias noted      JAHAIRA : see above        LABS REVIEW:        PATHOLOGY REVIEW:  FINAL PATHOLOGIC DIAGNOSIS 1/28/19  1. 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  2. PROSTATE, RIGHT BASE MEDIAL, BIOPSY:  -Prostatic adenocarcinoma, [place] score 3+3=6.  -Tumor occupies 10% of the tissue submitted (1 of 1 core involved).  -High grade prostatic intraepithelial neoplasia is present.  3. PROSTATE, RIGHT TRANSITION ZONE, BIOPSY:  -Benign prostatic tissue.  4. PROSTATE, RIGHT MID LATERAL, BIOPSY:  -Benign prostatic tissue with atrophy.  5. PROSTATE, RIGHT MID MEDIAL, BIOPSY:  -Benign prostatic tissue with atrophy.  6. PROSTATE, RIGHT APEX LATERAL, BIOPSY:  -Benign prostatic tissue with atrophy.  7. PROSTATE, RIGHT APEX MEDIAL, BIOPSY:  -Benign prostatic tissue with atrophy.  8. PROSTATE, LEFT BASE LATERAL, BIOPSY:  -Benign prostatic tissue with atrophy.  9. PROSTATE, LEFT BASE MEDIAL, BIOPSY:  -Benign prostatic tissue.  10. PROSTATE, LEFT MID LATERAL, BIOPSY:  -Prostatic adenocarcinoma, Maricarmen score 3+3=6.  -Tumor occupies 5-10% of the tissue submitted (1 of 1 core involved).  11. PROSTATE, LEFT MID MEDIAL, BIOPSY:  -Benign prostatic tissue.  12. PROSTATE, LEFT TRANSITION ZONE, BIOPSY:  -Prostatic adenocarcinoma, Maricarmen score 3+3=6.  -Tumor occupies 50-60% of the tissue submitted (1 of 1 core involved).  13. PROSTATE, LEFT APEX LATERAL, BIOPSY:  -Atypical small acinar proliferation (ASAP).  14. PROSTATE, LEFT APEX MEDIAL, BIOPSY:  -Prostatic adenocarcinoma, Maricarmen score 3+3=6.  -Tumor occupies less than 5% of the tissue submitted (1 of 1 core involved).    VOIDED URINE (CYTOLOGY) 1/15/19:  -Negative  for malignant cells.  -Benign squamous cells and urothelial cells.  -Inflammation present.       RADIOGRAPHIC REVIEW:  Us abdomen 4/3/17  The right kidney is normal in size and echotexture, without echogenic calculi  or hydronephrosis. There is no ascites.        Assessment:       Prostate cancer, T2c Gl3+3, T2c psa 2.9, NxMx  microhematuria     Plan:      Prostate cancer, T2c Gl3+3, T2c psa 2.9, NxMx  We reviewed his pathology. He has Maricarmen 3+3, stage T2c  We reviewed his risks. His risk category is: intermediate   We discussed all treatments and their associated risks and benefits to include active surveillance, brachytherapy, external beam radiation, prostatectomy robotic and open, cryotherapy and hormone ablation. The patient had the opportunity to ask questions.    His numbers were plugged into the [facility]K nomogram (using Gl3+3 and HGPIN for 6/14) His likelihood with radical prostatectomy of  cancer specific survival is 99 % at 10 and 15 years, progression free survival is 99 %at 5 years and 91 % t 10 years. He risks of organ confined disease 51 %, extracapsular extension 47 %, seminal vesicle involvement 2 %, lymph node involvement  2 %.  No further imaging recommended per nccn guidelines with <10%chance of spread to LN and psa <10     He was given a copy of [facility] prostate cancer booklet today   He will f/u with radiation oncology and robotic surgeon to discuss plan.  If there is any possiblity he could wait until Jan 1,2020, would be best but with his nodules and T2c disease, not sure I recommend this, at minimum would need a prostate MRI to ensure no obvious lesions extending outside prostate.  I have given him info for  as well as [facility] but he will aslo contact his insurance and see if he could see surgeon and have surgery done here.   He knows to contact them if he does not hear from them or us    Options for surgical treatment based on pt insurance:  Dr.Josh Alla Wallace or [facility] at  [facility]  Dr.Michael Nelson at [facility]    Microhematuria   -cytology negative  -blood expected after bx  -may need further imaging if persists, does have h/o stones    They will contact me with any questions, issues, getting in touch with someone     Cc    Cc radiation oncology       Delmy Hernandez MD

## 2019-02-12 ENCOUNTER — TELEPHONE (OUTPATIENT)
Dept: UROLOGY | Facility: CLINIC | Age: 64
End: 2019-02-12

## 2019-02-12 NOTE — TELEPHONE ENCOUNTER
----- Message from Griffin Montez sent at 2/12/2019  9:19 AM CST -----  Contact: pt wife  Type: Needs Medical Advice    Who Called:  Pt wife  Best Call Back Number: [phone]  Additional Information: pt wife called stating she needs her husbands insurance card and all the info of the diagnoses on her  would like a call back to discuss       Dr tono edwards fax number [phone]

## 2019-02-13 ENCOUNTER — OFFICE VISIT (OUTPATIENT)
Dept: RADIATION ONCOLOGY | Facility: CLINIC | Age: 64
End: 2019-02-13
Payer: COMMERCIAL

## 2019-02-13 ENCOUNTER — PATIENT MESSAGE (OUTPATIENT)
Dept: UROLOGY | Facility: CLINIC | Age: 64
End: 2019-02-13

## 2019-02-13 ENCOUNTER — DOCUMENTATION ONLY (OUTPATIENT)
Dept: RADIATION ONCOLOGY | Facility: CLINIC | Age: 64
End: 2019-02-13

## 2019-02-13 VITALS
RESPIRATION RATE: 14 BRPM | OXYGEN SATURATION: 97 % | DIASTOLIC BLOOD PRESSURE: 79 MMHG | BODY MASS INDEX: 45.41 KG/M2 | TEMPERATURE: 98 F | SYSTOLIC BLOOD PRESSURE: 129 MMHG | WEIGHT: 307.5 LBS | HEART RATE: 87 BPM

## 2019-02-13 DIAGNOSIS — C61 MALIGNANT NEOPLASM OF PROSTATE: ICD-10-CM

## 2019-02-13 PROCEDURE — 99205 OFFICE O/P NEW HI 60 MIN: CPT | Mod: ,,, | Performed by: RADIOLOGY

## 2019-02-13 PROCEDURE — 99205 PR OFFICE/OUTPT VISIT, NEW, LEVL V, 60-74 MIN: ICD-10-PCS | Mod: ,,, | Performed by: RADIOLOGY

## 2019-02-13 NOTE — PROGRESS NOTES
Marcus Parisi  [id number]  1955 2/13/2019  No referring provider defined for this encounter.    DIAGNOSIS: Prostate cancer  TREATMENT SITE(S): Prostate and seminal vesicles    INTENT: CURATIVE    TREATMENT SETTING: RT ALONE     MODALITY: PHOTON    TECHNIQUE:  INTENSITY MODULATED RADIOTHERAPY (IMRT)    IMRT MEDICAL NECESSITY:IMRT MEDICAL NECESSITY: Target volume irregular shape/proximate to critical structures, Narrow margins needed to protect adjacent structures, Target volume abuts previously treated area and High precision necessary     HPI: 64-year-old gentleman with intermediate risk prostate cancer, Maricarmen's 3+3, PSA 2.9 with palpable bilateral nodules, stage T2    I have personally performed treatment planning for the patient, reviewing relevant history/physical and imaging. I have defined GTV, CTV, PTV and organs at risk.     In order to accomplish this plan, I am ordering:  SIMULATION: CT SIMULATION FOR PLACEMENT OF TREATMENT FIELDS    CONTRAST: IV    TO ACCOMPLISH REPRODUCIBLE POSITION: VACLOC and FULL BLADDER    DEVICES FOR BEAM SHAPING: CUSTOMIZED MLC    CUSTOMIZED BOLUS: none    IMAGING: DAILY KV/KV OBI and CBCT WEEKLY    I have ordered a weekly physics check.  SPECIAL PHYSICS CONSULT: NO  REASON: N/A    SPECIAL TREATMENT CIRCUMSTANCE: NO   Concurrent or recent administration of chemotherapeutic agents which are  known potent radiosensitizers and thus will require vigilant monitoring for  exaggerated radiation toxicities.    LABS: PSA LAST WEEK OF RT    ANTICIPATED PRESCRIPTION: 70.2 gray to prostate and proximal seminal vesicles, 77.4 gray to prostate    TREATMENT: DAILY    PHYSICIAN: Michele Almeida MD

## 2019-02-13 NOTE — PROGRESS NOTES
Marcus ALVAREZ Isak  [id number]  1955 2/13/2019  Delmy Hernandez Md  [address] [place] [address] Dr  [address]  [place] [address]    REASON FOR CONSULTATION: Prostate cancer  TREATMENT GOAL: primary    HISTORY OF PRESENT ILLNESS:   63 y/o  gentleman with a recent diagnosis of prostate cancer noted after his PSA slowly increased. He had values about 1.2 with a subsequent increase of 2.1. On exam he did have palpable nodules in the prostate. He underwent transrectal ultrasound and biopsy revealing 2 positive cores with a Maricarmen's of 3+3. He had bilateral nodules and his most recent PSA increase of 2.9.    He presents a discussed radiation treatment options  Clinically doing well. He does have good urinary stream flow without obstruction dysuria hematuria, he is able to have erections. No bowel problems or rectal irritation.    Review of Systems   Constitutional: Negative for fatigue and unexpected weight change.   HENT:   Negative for mouth sores and voice change.    Respiratory: Negative for cough and shortness of breath.    Cardiovascular: Negative for chest pain and leg swelling.   Gastrointestinal: Negative for abdominal distention and abdominal pain.   Genitourinary: Negative for bladder incontinence and pelvic pain.    Musculoskeletal: Negative for arthralgias and back pain.   Skin: Negative for itching and rash.   Neurological: Negative for numbness and seizures.   Hematological: Negative for adenopathy. Does not bruise/bleed easily.   Psychiatric/Behavioral: Negative for confusion. The patient is not nervous/anxious.      Past Medical History:   Diagnosis Date    Allergy     Hyperlipidemia     Hypertension      Past Surgical History:   Procedure Laterality Date    BIOPSY, PROSTATE, RECTAL APPROACH, WITH US GUIDANCE N/A 1/28/2019    Performed by Delmy Hernandez [facility] at [facility] OR    carpel tunnel      bilaterally    CYSTOSCOPY N/A 1/28/2019    Performed by Delmy Hernandez [facility] at [facility] OR     Social  History     Socioeconomic History    Marital status:      Spouse name: None    Number of children: None    Years of education: None    Highest education level: None   Social Needs    Financial resource strain: None    Food insecurity - worry: None    Food insecurity - inability: None    Transportation needs - medical: None    Transportation needs - non-medical: None   Occupational History    None   Tobacco Use    Smoking status: Former Smoker    Smokeless tobacco: Never Used    Tobacco comment: quit 40 yrs ago   Substance and Sexual Activity    Alcohol use: Yes     Comment: occasional    Drug use: No    Sexual activity: Yes   Other Topics Concern    None   Social History Narrative    None     Family History   Problem Relation Age of Onset    Cancer Mother     Asthma Mother     Diabetes Mother     Heart disease Mother     Hypertension Mother     Hypertension Father     Crohn's disease Brother     Cancer Maternal Grandmother        PRIOR HISTORY OF CHEMOTHERAPY OR RADIOTHERAPY: Please see HPI for patients prior oncologic history.    Medication List with Changes/Refills   Current Medications    AMLODIPINE-BENAZEPRIL 5-20 MG (LOTREL) 5-20 MG PER CAPSULE    Take 1 capsule by mouth once daily.    ASPIRIN 81 MG CHEW    Take by mouth.    BISACODYL (FLEET BISACODYL) 10 MG/30 ML ENEM    Place 10 mg rectally once.    CIPROFLOXACIN HCL (CIPRO) 500 MG TABLET    Take 500 mg by mouth every 12 (twelve) hours.    CYANOCOBALAMIN (VITAMIN B-12) 100 MCG TABLET    Take by mouth.    LEVOTHYROXINE (SYNTHROID) 25 MCG TABLET    Take 1 tablet (25 mcg total) by mouth once daily.    OMEGA-3 ACID ETHYL ESTERS ORAL    Take by mouth.    SIMVASTATIN (ZOCOR) 40 MG TABLET    Take 1 tablet (40 mg total) by mouth once daily.    VITAMIN D (VITAMIN D3) 1000 UNITS TAB    Take 1,000 Units by mouth once daily.     Review of patient's allergies indicates:  No Known Allergies    QUALITY OF LIFE: 90%- Able to Carry on Normal  Activity: Minor Symptoms of Disease    Vitals:    02/13/19 1516   BP: 129/79   Pulse: 87   Resp: 14   Temp: 98 °F (36.7 °C)   SpO2: 97%   Weight: (!) 139.5 kg (307 lb 8 oz)   PainSc: 0-No pain       PHYSICAL EXAM: Oriented alert, moderately overweight, approximate 300 pounds  GENERAL: alert; in no apparent distress.   HEAD: normocephalic, atraumatic.  EYES: pupils are equal, round, reactive to light and accommodation. Sclera anicteric. Conjunctiva not injected.   NOSE/THROAT: no nasal erythema or rhinorrhea. Oropharynx pink, without erythema, ulcerations or thrush.   NECK: no cervical motion rigidity; supple with no masses.  CHEST: clear to auscultation bilaterally; no wheezes, crackles or rubs. Patient is speaking comfortably on room air with normal work of breathing without using accessory muscles of respiration.  CARDIOVASCULAR: regular rate and rhythm; no murmurs, rubs or gallops.  ABDOMEN: soft, nontender, nondistended. Bowel sounds present.   MUSCULOSKELETAL: no tenderness to palpation along the spine or scapulae. Normal range of motion.  NEUROLOGIC: cranial nerves II-XII intact bilaterally. Strength 5/5 in bilateral upper and lower extremities. No sensory deficits appreciated. Reflexes globally intact. No cerebellar signs. Normal gait.  LYMPHATIC: no cervical, supraclavicular or axillary adenopathy appreciated bilaterally.   EXTREMITIES: no clubbing, cyanosis, edema.  SKIN: no erythema, rashes, ulcerations noted.     REVIEW OF IMAGING/PATHOLOGY/LABS: Please see HPI. All images reviewed personally by dictating physician.     ASSESSMENT: 64-year-old gentleman with low to intermediate risk prostate cancer with clinical stage T2b, Baldwin's 3+3 in 2 cores, PSA 2.9, KPS 90  PLAN: Had a long discussion with diagrams showing him the location of the target area and radiation treatment options. He's been counseled at length with respect to surgery which she is also a candidate for.    I disclose a process external  beam ration therapy explained to him that the radiation would last for 8-1/2 weeks. I told that he could expect the PSA to decrease significantly and I explained to him what PSA failure would be in the background of external beam radiation therapy. I told that there would be no salvage therapy after radiation biochemical failure.  I explained to him that after prostatectomy he also would be monitored by PSA and of his values did not decrease to 0 and stabilize that he could undergo salvage with radiation therapy.  I told that after radiation therapy there would be no salvage with surgery because of the complications postoperatively    Also did discuss  radio active seed implantation, however because the T2 designation and his physical size this may not be possible.    I disclose a side effects and possible comp occasions of radiation versus surgery he stated he fully understood this. He is scheduled to see a urologist [facility], Dr. Gold, for consideration of prostatectomy.    If he wishes undergo external beam radiation therapy he would have return to us and I did explain to him the process which would include fudicial marker placement, and spacer OAR placement.      We discussed the risks and benefits of the above treatment and have gone over in detail the acute and late toxicities of radiation therapy to the pelvis. The patient expressed  understanding and has signed a consent form which is included in the patients chart. The patient has our contact information and understands that they are free to contact us at any time with questions or concerns regarding radiation therapy.    DISPOSITION: RTC PRN    TIME SPENT WITH PATIENT: I have personally seen and evaluated this patient. Greater than 50% of this time was spent discussing coordination of care and/or counseling.     PHYSICIAN: Michele Almeida MD

## 2019-02-13 NOTE — TELEPHONE ENCOUNTER
Please review and advise. No notation that states wether nodule or completed prostate to be removed.

## 2019-04-06 ENCOUNTER — TELEPHONE (OUTPATIENT)
Dept: FAMILY MEDICINE | Facility: CLINIC | Age: 64
End: 2019-04-06

## 2019-04-06 NOTE — TELEPHONE ENCOUNTER
Can you look at his case for prostate cancer , he will be your future patient - he is in a watchful waiting stage

## 2019-04-09 NOTE — TELEPHONE ENCOUNTER
Patricia, call patient and see if he decided what to do for his prostate cancer? Dr edwards did an MRI of his prostate can we get him to sign release consent for the radiology facility he did it at?

## 2019-04-10 NOTE — TELEPHONE ENCOUNTER
Pt at work. Spoke with wife. Per dr Mejia Gold they will keep eye on the Prostate. MRI results not in yet (I checked Imaging Tab).  Pt to come in and sign Medical release form for the MRI.

## 2019-04-25 ENCOUNTER — OFFICE VISIT (OUTPATIENT)
Dept: FAMILY MEDICINE | Facility: CLINIC | Age: 64
End: 2019-04-25
Payer: COMMERCIAL

## 2019-04-25 VITALS
HEIGHT: 69 IN | DIASTOLIC BLOOD PRESSURE: 66 MMHG | SYSTOLIC BLOOD PRESSURE: 124 MMHG | WEIGHT: 300 LBS | HEART RATE: 76 BPM | BODY MASS INDEX: 44.43 KG/M2 | RESPIRATION RATE: 16 BRPM | TEMPERATURE: 98 F | OXYGEN SATURATION: 96 %

## 2019-04-25 DIAGNOSIS — R60.0 EDEMA OF LEFT LOWER EXTREMITY: Primary | ICD-10-CM

## 2019-04-25 DIAGNOSIS — C61 MALIGNANT NEOPLASM OF PROSTATE: ICD-10-CM

## 2019-04-25 DIAGNOSIS — M79.662 PAIN OF LEFT CALF: ICD-10-CM

## 2019-04-25 DIAGNOSIS — I83.893 VARICOSE VEINS OF BILATERAL LOWER EXTREMITIES WITH OTHER COMPLICATIONS: ICD-10-CM

## 2019-04-25 DIAGNOSIS — C61 PROSTATE CANCER: ICD-10-CM

## 2019-04-25 DIAGNOSIS — M70.52 POPLITEAL BURSITIS OF LEFT KNEE: ICD-10-CM

## 2019-04-25 PROBLEM — I83.891 VARICOSE VEINS OF RIGHT LOWER EXTREMITY WITH EDEMA: Status: ACTIVE | Noted: 2019-04-25

## 2019-04-25 PROCEDURE — 99213 OFFICE O/P EST LOW 20 MIN: CPT | Mod: ,,, | Performed by: INTERNAL MEDICINE

## 2019-04-25 PROCEDURE — 99213 PR OFFICE/OUTPT VISIT, EST, LEVL III, 20-29 MIN: ICD-10-PCS | Mod: ,,, | Performed by: INTERNAL MEDICINE

## 2019-04-26 ENCOUNTER — TELEPHONE (OUTPATIENT)
Dept: FAMILY MEDICINE | Facility: CLINIC | Age: 64
End: 2019-04-26

## 2019-04-26 NOTE — TELEPHONE ENCOUNTER
Toshia @ [facility] called for clarification on U/S for patient.  U/S extremity Non-Vascular - needs clarification on what test is for.  If looking for Baker's cyst? Or Bursa?  If looking for Bursa they cannot do U/S for that. Please advise and I will call them back.

## 2019-04-27 PROBLEM — C61 PROSTATE CANCER: Status: ACTIVE | Noted: 2017-12-07

## 2019-04-27 NOTE — PROGRESS NOTES
Subjective:       Patient ID: Marcus Parisi is a 64 y.o. male.    Chief Complaint: Edema (left leg and foot)    64-year-old gentleman who is here for an acute care problem that began about a week ago with left lower extremity swelling and calf pain. The pain begins high in the knee and radiates downward to about just above the ankle. He does have a history of varicosities but no history of blood clots. He has a history of low-grade prostate cancer which is a new diagnosis and is being followed with watchful waiting. He does have significant osteoarthritis of the knees although this is asymptomatic. He reports no trauma. He has a history of dyslipidemia and hypertension which are well controlled. Patient's BMI is 44 however and he is always on his feet as a .    Review of Systems   Constitutional: Negative for activity change, diaphoresis, fatigue and fever.   HENT: Negative for congestion, ear pain, postnasal drip, sinus pressure, sore throat and trouble swallowing.    Eyes: Negative for pain.   Respiratory: Negative for cough, chest tightness, shortness of breath and wheezing.    Cardiovascular: Positive for leg swelling. Negative for chest pain and palpitations.   Gastrointestinal: Negative for abdominal distention, abdominal pain, blood in stool, constipation and diarrhea.   Endocrine: Negative for cold intolerance and heat intolerance.   Genitourinary: Negative for decreased urine volume, difficulty urinating, dysuria, flank pain, frequency and hematuria.   Musculoskeletal: Negative for arthralgias, back pain, joint swelling, myalgias and neck pain.        Leg pain   Skin: Negative for pallor, rash and wound.   Neurological: Negative for tremors, syncope, weakness, light-headedness, numbness and headaches.   Hematological: Negative for adenopathy.   Psychiatric/Behavioral: Negative for confusion, decreased concentration, hallucinations, self-injury, sleep disturbance and suicidal ideas. The patient is not  nervous/anxious.        Past Medical History:   Diagnosis Date    Allergy     Hyperlipidemia     Hypertension     Prostate cancer        Past Surgical History:   Procedure Laterality Date    BIOPSY, PROSTATE, RECTAL APPROACH, WITH US GUIDANCE N/A 1/28/2019    [facility]    carpel tunnel      bilaterally    CYSTOSCOPY N/A 1/28/2019    [facility]       Family History   Problem Relation Age of Onset    Cancer Mother     Asthma Mother     Diabetes Mother     Heart disease Mother     Hypertension Mother     Hypertension Father     Crohn's disease Brother     Cancer Maternal Grandmother        Social History     Socioeconomic History    Marital status:      Spouse name: Not on file    Number of children: Not on file    Years of education: Not on file    Highest education level: Not on file   Occupational History    Not on file   Social Needs    Financial resource strain: Not on file    Food insecurity:     Worry: Not on file     Inability: Not on file    Transportation needs:     Medical: Not on file     Non-medical: Not on file   Tobacco Use    Smoking status: Former Smoker    Smokeless tobacco: Never Used    Tobacco comment: quit 40 yrs ago   Substance and Sexual Activity    Alcohol use: Yes     Comment: occasional    Drug use: No    Sexual activity: Yes   Lifestyle    Physical activity:     Days per week: Not on file     Minutes per session: Not on file    Stress: Not at all   Relationships    Social connections:     Talks on phone: Not on file     Gets together: Not on file     Attends Cheondoism service: Not on file     Active member of club or organization: Not on file     Attends meetings of clubs or organizations: Not on file     Relationship status: Not on file   Other Topics Concern    Not on file   Social History Narrative    Not on file       Current Outpatient Medications   Medication Sig  Dispense Refill    amlodipine-benazepril 5-20 mg (LOTREL) 5-20 mg per capsule Take 1 capsule by mouth once daily. 90 capsule 3    aspirin 81 MG Chew Take by mouth.      bisacodyl (FLEET BISACODYL) 10 mg/30 mL Enem Place 10 mg rectally once.      cyanocobalamin (VITAMIN B-12) 100 MCG tablet Take by mouth.      OMEGA-3 ACID ETHYL ESTERS ORAL Take by mouth.      simvastatin (ZOCOR) 40 MG tablet Take 1 tablet (40 mg total) by mouth once daily. 90 tablet 3    vitamin D (VITAMIN D3) 1000 units Tab Take 1,000 Units by mouth once daily.       No current facility-administered medications for this visit.      Facility-Administered Medications Ordered in Other Visits   Medication Dose Route Frequency Provider Last Rate Last Dose    gentamicin 80 mg/100ml NACL IVPB IVPB 80 mg  80 mg Intravenous On Call Procedure Delmy Hernandez MD           Review of patient's allergies indicates:  No Known Allergies  Objective:      Physical Exam   Constitutional: He is oriented to person, place, and time. He appears well-developed and well-nourished. No distress.   HENT:   Head: Normocephalic and atraumatic.   Right Ear: External ear normal.   Left Ear: External ear normal.   Nose: Nose normal.   Mouth/Throat: Oropharynx is clear and moist.   Eyes: Conjunctivae and EOM are normal. Right eye exhibits no discharge. Left eye exhibits no discharge. No scleral icterus.   Neck: Normal range of motion. Neck supple. No JVD present. No tracheal deviation present. No thyromegaly present.   Cardiovascular: Normal rate, regular rhythm, normal heart sounds and intact distal pulses. Exam reveals no gallop.   No murmur heard.  Pulmonary/Chest: Effort normal and breath sounds normal. No respiratory distress. He has no wheezes. He has no rales.   Abdominal: Soft. Bowel sounds are normal. He exhibits no distension and no mass. There is no tenderness.   Musculoskeletal: Normal range of motion. He exhibits no edema.        Left lower leg: He  exhibits tenderness and swelling.   Non pitting edema with the mid calf of the left being larger by 1 inch than the right   Positive varicosities seen medially below the knee    Lymphadenopathy:     He has no cervical adenopathy.   Neurological: He is alert and oriented to person, place, and time.   Skin: Skin is warm and dry. No rash noted. He is not diaphoretic. No erythema.   Psychiatric: He has a normal mood and affect. His behavior is normal. Judgment and thought content normal.     Assessment:       1. Edema of left lower extremity    2. Pain of left calf    3. Varicose veins of bilateral lower extremities with other complications    4. Popliteal bursitis of left knee    5. Prostate cancer    6. Malignant neoplasm of prostate        Plan:       Edema of left lower extremity  -     VAS US Venous Leg Left; Future    Pain of left calf with hx of prostate cancer  -     VAS US Venous Leg Left; Future    Varicose veins of bilateral lower extremities with other complications   Warm compresses and prn aspirin    Popliteal bursitis of left knee- ?  -     US Extremity Non Vascular Limited Left; Future; Expected date: 04/25/2019

## 2019-04-29 ENCOUNTER — TELEPHONE (OUTPATIENT)
Dept: FAMILY MEDICINE | Facility: CLINIC | Age: 64
End: 2019-04-29

## 2019-04-29 RX ORDER — IBUPROFEN 200 MG
400 TABLET ORAL 3 TIMES DAILY
COMMUNITY
End: 2019-05-03

## 2019-04-29 NOTE — TELEPHONE ENCOUNTER
Wonderful !!! No dvt but he has a rehan baker's cyst behind his knee with debris in it  It is over 8cm - does he have an ortho?  Is the advil helping? We can also do a steroid pack for the inflammation

## 2019-05-01 RX ORDER — IBUPROFEN 800 MG/1
800 TABLET ORAL 3 TIMES DAILY
Qty: 90 TABLET | Refills: 1 | Status: CANCELLED | OUTPATIENT
Start: 2019-05-01

## 2019-05-03 RX ORDER — IBUPROFEN 600 MG/1
600 TABLET ORAL EVERY 8 HOURS PRN
Qty: 60 TABLET | Refills: 1 | Status: SHIPPED | OUTPATIENT
Start: 2019-05-03 | End: 2019-06-06

## 2019-06-06 ENCOUNTER — OFFICE VISIT (OUTPATIENT)
Dept: ORTHOPEDICS | Facility: CLINIC | Age: 64
End: 2019-06-06
Payer: COMMERCIAL

## 2019-06-06 VITALS
SYSTOLIC BLOOD PRESSURE: 110 MMHG | BODY MASS INDEX: 44.43 KG/M2 | HEART RATE: 80 BPM | DIASTOLIC BLOOD PRESSURE: 70 MMHG | HEIGHT: 69 IN | WEIGHT: 300 LBS

## 2019-06-06 DIAGNOSIS — M17.12 PRIMARY OSTEOARTHRITIS OF LEFT KNEE: ICD-10-CM

## 2019-06-06 DIAGNOSIS — M17.11 PRIMARY OSTEOARTHRITIS OF RIGHT KNEE: Primary | ICD-10-CM

## 2019-06-06 PROCEDURE — 73562 PR  X-RAY KNEE 3 VIEW: ICD-10-PCS | Mod: 50,,, | Performed by: ORTHOPAEDIC SURGERY

## 2019-06-06 PROCEDURE — 99204 PR OFFICE/OUTPT VISIT, NEW, LEVL IV, 45-59 MIN: ICD-10-PCS | Mod: 25,,, | Performed by: ORTHOPAEDIC SURGERY

## 2019-06-06 PROCEDURE — 99204 OFFICE O/P NEW MOD 45 MIN: CPT | Mod: 25,,, | Performed by: ORTHOPAEDIC SURGERY

## 2019-06-06 PROCEDURE — 20610 DRAIN/INJ JOINT/BURSA W/O US: CPT | Mod: 50,,, | Performed by: ORTHOPAEDIC SURGERY

## 2019-06-06 PROCEDURE — 73562 X-RAY EXAM OF KNEE 3: CPT | Mod: 50,,, | Performed by: ORTHOPAEDIC SURGERY

## 2019-06-06 PROCEDURE — 20610 LARGE JOINT ASPIRATION/INJECTION: R KNEE: ICD-10-PCS | Mod: 50,,, | Performed by: ORTHOPAEDIC SURGERY

## 2019-06-06 RX ORDER — METHYLPREDNISOLONE ACETATE 40 MG/ML
40 INJECTION, SUSPENSION INTRA-ARTICULAR; INTRALESIONAL; INTRAMUSCULAR; SOFT TISSUE
Status: DISCONTINUED | OUTPATIENT
Start: 2019-06-06 | End: 2019-06-06 | Stop reason: HOSPADM

## 2019-06-06 RX ADMIN — METHYLPREDNISOLONE ACETATE 40 MG: 40 INJECTION, SUSPENSION INTRA-ARTICULAR; INTRALESIONAL; INTRAMUSCULAR; SOFT TISSUE at 09:06

## 2019-06-06 NOTE — PROGRESS NOTES
[facility]    Subjective:     Chief Complaint:   Chief Complaint   Patient presents with    Left Knee - Pain     Cyst behind left knee x 3 months. US done at [facility] in April    Right Knee - Pain     Right knee pain x 2 years       Past Medical History:   Diagnosis Date    Allergy     Hyperlipidemia     Hypertension     Prostate cancer        Past Surgical History:   Procedure Laterality Date    BIOPSY, PROSTATE, RECTAL APPROACH, WITH US GUIDANCE N/A 1/28/2019    Per[facility] by Del[facility] at [facility] OR    carpel tunnel      bilaterally    CYSTOSCOPY N/A 1/28/2019    Performed by Delmy Hernandez MD at [facility] OR       Current Outpatient Medications   Medication Sig    amlodipine-benazepril 5-20 mg (LOTREL) 5-20 mg per capsule Take 1 capsule by mouth once daily.    aspirin 81 MG Chew Take by mouth.    cyanocobalamin (VITAMIN B-12) 100 MCG tablet Take by mouth.    OMEGA-3 ACID ETHYL ESTERS ORAL Take by mouth.    simvastatin (ZOCOR) 40 MG tablet Take 1 tablet (40 mg total) by mouth once daily.    vitamin D (VITAMIN D3) 1000 units Tab Take 1,000 Units by mouth once daily.     No current facility-administered medications for this visit.      Facility-Administered Medications Ordered in Other Visits   Medication    gentamicin 80 mg/100ml NACL IVPB IVPB 80 mg       Review of patient's allergies indicates:  No Known Allergies    Family History   Problem Relation Age of Onset    Cancer Mother     Asthma Mother     Diabetes Mother     Heart disease Mother     Hypertension Mother     Hypertension Father     Crohn's disease Brother     Cancer Maternal Grandmother        Social History     Socioeconomic History    Marital status:      Spouse name: Not on file    Number of children: Not on file    Years of education: Not on file    Highest education level: Not on file   Occupational History    Not on file   Social Needs    Financial resource strain: Not on file     Food insecurity:     Worry: Not on file     Inability: Not on file    Transportation needs:     Medical: Not on file     Non-medical: Not on file   Tobacco Use    Smoking status: Former Smoker    Smokeless tobacco: Never Used    Tobacco comment: quit 40 yrs ago   Substance and Sexual Activity    Alcohol use: Yes     Comment: occasional    Drug use: No    Sexual activity: Yes   Lifestyle    Physical activity:     Days per week: Not on file     Minutes per session: Not on file    Stress: Not at all   Relationships    Social connections:     Talks on phone: Not on file     Gets together: Not on file     Attends Sikhism service: Not on file     Active member of club or organization: Not on file     Attends meetings of clubs or organizations: Not on file     Relationship status: Not on file   Other Topics Concern    Not on file   Social History Narrative    Not on file       History of present illness: Patient comes in today for the left knee. Also for the right knee. Both his knees bother him. The right is somewhat worse in the left but both bother him. This been going on for several years.      Review of Systems:    Constitution: Negative for chills, fever, and sweats.  Negative for unexplained weight loss.    HENT:  Negative for headaches and blurry vision.    Cardiovascular:Negative for chest pain or irregular heart beat. Negative for hypertension.    Respiratory:  Negative for cough and shortness of breath.    Gastrointestinal: Negative for abdominal pain, heartburn, melena, nausea, and vomitting.    Genitourinary:  Negative bladder incontinence and dysuria.    Musculoskeletal:  See HPI for details.     Neurological: Negative for numbness.    Psychiatric/Behavioral: Negative for depression.  The patient is not nervous/anxious.      Endocrine: Negative for polyuria    Hematologic/Lymphatic: Negative for bleeding problem.  Does not bruise/bleed easily.    Skin: Negative for poor would healing and  rash    Objective:      Physical Examination:    Vital Signs:    Vitals:    06/06/19 0830   BP: 110/70   Pulse: 80       Body mass index is 44.3 kg/m².    This a well-developed, well nourished patient in no acute distress.  They are alert and oriented and cooperative to examination.        Patient has bilateral varus deformities. He has pitting edema in his lower extremities. He has peripheral vascular insufficiency color changes. He has positive palpable Baker cyst bilaterally. He has crepitus bilaterally. EHLs are intact deep tendon reflexes are intact  Pertinent New Results:    XRAY Report / Interpretation:   AP lateral and sunrise views of the bilateral knees demonstrate advanced arthritis of his bilateral knees with complete loss of medial compartment on the right side and moderate loss of medial compartment and the left side but 3 compartment spurring bilaterally    Assessment/Plan:      Advanced arthritis bilateral knees. I injected both knees with Depo-Medrol and lidocaine. Follow-up in 3 months. Additionally is counseled extensively as to how to care for his peripheral. Vascular insufficiency. Is also encouraged to obtain the opinion of her vascular surgeon to see if in the can be done for the peripheral vascular insufficiency.      This note was created using Dragon voice recognition software that occasionally misinterpreted phrases or words.

## 2019-06-06 NOTE — PROCEDURES
Large Joint Aspiration/Injection: R knee  Date/Time: 6/6/2019 9:20 AM  Performed by: Jorge Chavez MD  Authorized by: Jorge Chavez MD     Consent Done?:  Yes (Verbal)  Indications:  Pain  Procedure site marked: Yes    Timeout: Prior to procedure the correct patient, procedure, and site was verified      Location:  Knee  Site:  R knee  Prep: Patient was prepped and draped in usual sterile fashion    Needle size:  25 G  Medications:  40 mg methylPREDNISolone acetate 40 mg/mL  Patient tolerance:  Patient tolerated the procedure well with no immediate complications  Large Joint Aspiration/Injection: L knee  Date/Time: 6/6/2019 9:21 AM  Performed by: Jorge Chavez MD  Authorized by: Jorge Chavez MD     Consent Done?:  Yes (Verbal)  Indications:  Pain  Procedure site marked: Yes    Timeout: Prior to procedure the correct patient, procedure, and site was verified      Location:  Knee  Site:  L knee  Prep: Patient was prepped and draped in usual sterile fashion    Needle size:  25 G  Medications:  40 mg methylPREDNISolone acetate 40 mg/mL  Patient tolerance:  Patient tolerated the procedure well with no immediate complications

## 2019-08-26 ENCOUNTER — PATIENT MESSAGE (OUTPATIENT)
Dept: FAMILY MEDICINE | Facility: CLINIC | Age: 64
End: 2019-08-26

## 2019-08-27 ENCOUNTER — TELEPHONE (OUTPATIENT)
Dept: FAMILY MEDICINE | Facility: CLINIC | Age: 64
End: 2019-08-27

## 2019-09-10 ENCOUNTER — OFFICE VISIT (OUTPATIENT)
Dept: ORTHOPEDICS | Facility: CLINIC | Age: 64
End: 2019-09-10
Payer: COMMERCIAL

## 2019-09-10 VITALS
HEIGHT: 69 IN | WEIGHT: 300 LBS | HEART RATE: 66 BPM | SYSTOLIC BLOOD PRESSURE: 138 MMHG | DIASTOLIC BLOOD PRESSURE: 80 MMHG | BODY MASS INDEX: 44.43 KG/M2

## 2019-09-10 DIAGNOSIS — M17.12 PRIMARY OSTEOARTHRITIS OF LEFT KNEE: ICD-10-CM

## 2019-09-10 DIAGNOSIS — M17.11 PRIMARY OSTEOARTHRITIS OF RIGHT KNEE: Primary | ICD-10-CM

## 2019-09-10 PROCEDURE — 20610 DRAIN/INJ JOINT/BURSA W/O US: CPT | Mod: 50,S$GLB,, | Performed by: ORTHOPAEDIC SURGERY

## 2019-09-10 PROCEDURE — 99213 OFFICE O/P EST LOW 20 MIN: CPT | Mod: 25,S$GLB,, | Performed by: ORTHOPAEDIC SURGERY

## 2019-09-10 PROCEDURE — 99213 PR OFFICE/OUTPT VISIT, EST, LEVL III, 20-29 MIN: ICD-10-PCS | Mod: 25,S$GLB,, | Performed by: ORTHOPAEDIC SURGERY

## 2019-09-10 PROCEDURE — 20610 LARGE JOINT ASPIRATION/INJECTION: R KNEE: ICD-10-PCS | Mod: 50,S$GLB,, | Performed by: ORTHOPAEDIC SURGERY

## 2019-09-10 RX ORDER — METHYLPREDNISOLONE ACETATE 40 MG/ML
40 INJECTION, SUSPENSION INTRA-ARTICULAR; INTRALESIONAL; INTRAMUSCULAR; SOFT TISSUE
Status: DISCONTINUED | OUTPATIENT
Start: 2019-09-10 | End: 2019-09-10 | Stop reason: HOSPADM

## 2019-09-10 RX ADMIN — METHYLPREDNISOLONE ACETATE 40 MG: 40 INJECTION, SUSPENSION INTRA-ARTICULAR; INTRALESIONAL; INTRAMUSCULAR; SOFT TISSUE at 07:09

## 2019-09-10 NOTE — PROGRESS NOTES
[facility]    Subjective:     Chief Complaint:   Chief Complaint   Patient presents with    Left Knee - Pain     Here for injection of bilateral knees.    Right Knee - Pain       Past Medical History:   Diagnosis Date    Allergy     Hyperlipidemia     Hypertension     Prostate cancer        Past Surgical History:   Procedure Laterality Date    BIOPSY, PROSTATE, RECTAL APPROACH, WITH US GUIDANCE N/A 1/28/2019    Performed by Delmy Hernandez MD at [facility] OR    carpel tunnel      bilaterally    CYSTOSCOPY N/A 1/28/2019    Performed by Delmy Hernandez MD at [facility] OR       Current Outpatient Medications   Medication Sig    amlodipine-benazepril 5-20 mg (LOTREL) 5-20 mg per capsule Take 1 capsule by mouth once daily.    aspirin 81 MG Chew Take by mouth.    cyanocobalamin (VITAMIN B-12) 100 MCG tablet Take by mouth.    OMEGA-3 ACID ETHYL ESTERS ORAL Take by mouth.    simvastatin (ZOCOR) 40 MG tablet Take 1 tablet (40 mg total) by mouth once daily.    vitamin D (VITAMIN D3) 1000 units Tab Take 1,000 Units by mouth once daily.     No current facility-administered medications for this visit.      Facility-Administered Medications Ordered in Other Visits   Medication    gentamicin 80 mg/100ml NACL IVPB IVPB 80 mg       Review of patient's allergies indicates:  No Known Allergies    Family History   Problem Relation Age of Onset    Cancer Mother     Asthma Mother     Diabetes Mother     Heart disease Mother     Hypertension Mother     Hypertension Father     Crohn's disease Brother     Cancer Maternal Grandmother        Social History     Socioeconomic History    Marital status:      Spouse name: Not on file    Number of children: Not on file    Years of education: Not on file    Highest education level: Not on file   Occupational History    Not on file   Social Needs    Financial resource strain: Not on file    Food insecurity:     Worry: Not on file      Inability: Not on file    Transportation needs:     Medical: Not on file     Non-medical: Not on file   Tobacco Use    Smoking status: Former Smoker    Smokeless tobacco: Never Used    Tobacco comment: quit 40 yrs ago   Substance and Sexual Activity    Alcohol use: Yes     Comment: occasional    Drug use: No    Sexual activity: Yes   Lifestyle    Physical activity:     Days per week: Not on file     Minutes per session: Not on file    Stress: Not at all   Relationships    Social connections:     Talks on phone: Not on file     Gets together: Not on file     Attends Yarsanism service: Not on file     Active member of club or organization: Not on file     Attends meetings of clubs or organizations: Not on file     Relationship status: Not on file   Other Topics Concern    Not on file   Social History Narrative    Not on file       History of present illness: Patient returns today for his bilateral knees. Complaint of bilateral knee pain right worse than left. He recently had a fall from ladder. Before that the injection seemed to be working.      Review of Systems:    Constitution: Negative for chills, fever, and sweats.  Negative for unexplained weight loss.    HENT:  Negative for headaches and blurry vision.    Cardiovascular:Negative for chest pain or irregular heart beat. Negative for hypertension.    Respiratory:  Negative for cough and shortness of breath.    Gastrointestinal: Negative for abdominal pain, heartburn, melena, nausea, and vomitting.    Genitourinary:  Negative bladder incontinence and dysuria.    Musculoskeletal:  See HPI for details.     Neurological: Negative for numbness.    Psychiatric/Behavioral: Negative for depression.  The patient is not nervous/anxious.      Endocrine: Negative for polyuria    Hematologic/Lymphatic: Negative for bleeding problem.  Does not bruise/bleed easily.    Skin: Negative for poor would healing and rash    Objective:      Physical Examination:    Vital  Signs:    Vitals:    09/10/19 0739   BP: 138/80   Pulse: 66       Body mass index is 44.3 kg/m².    This a well-developed, well nourished patient in no acute distress.  They are alert and oriented and cooperative to examination.        Patient has bilateral anterior crepitus. He has medial joint line tenderness on the right. His knees are stable to varus valgus stresses. Negative straight leg raises. EHLs are intact deep tendon reflexes are intact. He is morbidly obese.  Pertinent New Results:    XRAY Report / Interpretation:       Assessment/Plan:      Advanced arthritis bilateral knees right worse than left. I injected the right knee with Depo-Medrol and lidocaine. I injected the left knee with Depo-Medrol and lidocaine. Follow-up when necessary. We did speak extensively about joint replacement surgery      This note was created using Dragon voice recognition software that occasionally misinterpreted phrases or words.

## 2019-09-10 NOTE — PROCEDURES
Large Joint Aspiration/Injection: R knee  Date/Time: 9/10/2019 7:57 AM  Performed by: Jorge Chavez MD  Authorized by: Jorge Chavez MD     Consent Done?:  Yes (Verbal)  Indications:  Pain  Procedure site marked: Yes    Timeout: Prior to procedure the correct patient, procedure, and site was verified    Anesthesia  Local anesthesia used  Anesthesia: local infiltration  Anesthetic: lidocaine 1% without epinephrine    Location:  Knee  Site:  R knee  Prep: Patient was prepped and draped in usual sterile fashion    Needle size:  25 G  Medications:  40 mg methylPREDNISolone acetate 40 mg/mL; 40 mg methylPREDNISolone acetate 40 mg/mL  Patient tolerance:  Patient tolerated the procedure well with no immediate complications  Large Joint Aspiration/Injection: L knee  Date/Time: 9/10/2019 7:57 AM  Performed by: Jorge Chavez MD  Authorized by: Jorge Chavez MD     Consent Done?:  Yes (Verbal)  Indications:  Pain  Procedure site marked: Yes    Timeout: Prior to procedure the correct patient, procedure, and site was verified    Anesthesia  Local anesthesia used  Anesthesia: local infiltration  Anesthetic: lidocaine 1% without epinephrine    Location:  Knee  Site:  L knee  Prep: Patient was prepped and draped in usual sterile fashion    Needle size:  25 G  Medications:  40 mg methylPREDNISolone acetate 40 mg/mL; 40 mg methylPREDNISolone acetate 40 mg/mL  Patient tolerance:  Patient tolerated the procedure well with no immediate complications

## 2019-09-16 ENCOUNTER — PATIENT MESSAGE (OUTPATIENT)
Dept: FAMILY MEDICINE | Facility: CLINIC | Age: 64
End: 2019-09-16

## 2019-09-18 ENCOUNTER — TELEPHONE (OUTPATIENT)
Dept: FAMILY MEDICINE | Facility: CLINIC | Age: 64
End: 2019-09-18

## 2019-09-20 RX ORDER — PREDNISONE 20 MG/1
40 TABLET ORAL DAILY
Qty: 10 TABLET | Refills: 0 | Status: SHIPPED | OUTPATIENT
Start: 2019-09-20 | End: 2019-09-25

## 2019-09-27 DIAGNOSIS — L23.7 POISON IVY: Primary | ICD-10-CM

## 2019-09-27 RX ORDER — DOXEPIN HYDROCHLORIDE 50 MG/G
CREAM TOPICAL 4 TIMES DAILY
Qty: 45 G | Refills: 2 | Status: SHIPPED | OUTPATIENT
Start: 2019-09-27 | End: 2020-03-20

## 2019-09-27 RX ORDER — PREDNISONE 20 MG/1
TABLET ORAL
Qty: 20 TABLET | Refills: 0 | Status: SHIPPED | OUTPATIENT
Start: 2019-09-27 | End: 2019-09-30 | Stop reason: SDUPTHER

## 2019-09-27 RX ORDER — DOXEPIN HYDROCHLORIDE 10 MG/1
20 CAPSULE ORAL NIGHTLY
Qty: 30 CAPSULE | Refills: 0 | Status: SHIPPED | OUTPATIENT
Start: 2019-09-27 | End: 2020-03-20

## 2019-09-30 ENCOUNTER — OFFICE VISIT (OUTPATIENT)
Dept: FAMILY MEDICINE | Facility: CLINIC | Age: 64
End: 2019-09-30
Payer: COMMERCIAL

## 2019-09-30 VITALS
WEIGHT: 310 LBS | SYSTOLIC BLOOD PRESSURE: 138 MMHG | RESPIRATION RATE: 17 BRPM | HEART RATE: 74 BPM | BODY MASS INDEX: 45.91 KG/M2 | DIASTOLIC BLOOD PRESSURE: 80 MMHG | OXYGEN SATURATION: 98 % | HEIGHT: 69 IN | TEMPERATURE: 99 F

## 2019-09-30 DIAGNOSIS — L23.7 POISON IVY: ICD-10-CM

## 2019-09-30 DIAGNOSIS — L25.5 DERMATITIS DUE TO PLANTS, INCLUDING POISON IVY, SUMAC, AND OAK: Primary | ICD-10-CM

## 2019-09-30 DIAGNOSIS — I10 ESSENTIAL (PRIMARY) HYPERTENSION: ICD-10-CM

## 2019-09-30 DIAGNOSIS — E78.5 DYSLIPIDEMIA: ICD-10-CM

## 2019-09-30 PROBLEM — M17.0 BILATERAL PRIMARY OSTEOARTHRITIS OF KNEE: Status: ACTIVE | Noted: 2019-09-30

## 2019-09-30 PROCEDURE — 99213 OFFICE O/P EST LOW 20 MIN: CPT | Mod: 25,S$GLB,, | Performed by: INTERNAL MEDICINE

## 2019-09-30 PROCEDURE — 96372 PR INJECTION,THERAP/PROPH/DIAG2ST, IM OR SUBCUT: ICD-10-PCS | Mod: S$GLB,,, | Performed by: INTERNAL MEDICINE

## 2019-09-30 PROCEDURE — 96372 THER/PROPH/DIAG INJ SC/IM: CPT | Mod: S$GLB,,, | Performed by: INTERNAL MEDICINE

## 2019-09-30 PROCEDURE — 99213 PR OFFICE/OUTPT VISIT, EST, LEVL III, 20-29 MIN: ICD-10-PCS | Mod: 25,S$GLB,, | Performed by: INTERNAL MEDICINE

## 2019-09-30 RX ORDER — DEXAMETHASONE SODIUM PHOSPHATE 4 MG/ML
8 INJECTION, SOLUTION INTRA-ARTICULAR; INTRALESIONAL; INTRAMUSCULAR; INTRAVENOUS; SOFT TISSUE ONCE
Status: COMPLETED | OUTPATIENT
Start: 2019-09-30 | End: 2019-09-30

## 2019-09-30 RX ORDER — AMLODIPINE AND BENAZEPRIL HYDROCHLORIDE 5; 20 MG/1; MG/1
1 CAPSULE ORAL DAILY
Qty: 90 CAPSULE | Refills: 3 | Status: SHIPPED | OUTPATIENT
Start: 2019-09-30 | End: 2020-01-20 | Stop reason: SDUPTHER

## 2019-09-30 RX ORDER — SIMVASTATIN 40 MG/1
40 TABLET, FILM COATED ORAL DAILY
Qty: 90 TABLET | Refills: 3 | Status: SHIPPED | OUTPATIENT
Start: 2019-09-30 | End: 2020-01-20 | Stop reason: SDUPTHER

## 2019-09-30 RX ORDER — PREDNISONE 20 MG/1
TABLET ORAL
Qty: 20 TABLET | Refills: 0 | Status: SHIPPED | OUTPATIENT
Start: 2019-09-30 | End: 2020-03-20

## 2019-09-30 RX ORDER — HYDROXYZINE HYDROCHLORIDE 25 MG/1
25 TABLET, FILM COATED ORAL 3 TIMES DAILY PRN
Qty: 30 TABLET | Refills: 2 | Status: SHIPPED | OUTPATIENT
Start: 2019-09-30 | End: 2020-03-20

## 2019-09-30 RX ADMIN — DEXAMETHASONE SODIUM PHOSPHATE 8 MG: 4 INJECTION, SOLUTION INTRA-ARTICULAR; INTRALESIONAL; INTRAMUSCULAR; INTRAVENOUS; SOFT TISSUE at 11:09

## 2019-09-30 NOTE — PROGRESS NOTES
Subjective:       Patient ID: Marcus Parisi is a 64 y.o. male.    Chief Complaint: Annual Exam; Hypertension; and posion ivy    64-year-old gentleman who I am seeing for 6 month follow-up for hypertension, dyslipidemia, abnormal liver functions, osteoarthritis as well as an elevation in BMI of almost 46.  He also has a history of early prostate cancer for which the PSA is being monitored.  He had some blood work done to include thyroid functions, lipid profile, CMP and CBC and everything looked terrific including an LDL of 88 on simvastatin 40 mg.  The patient today has a chief complaint of almost a 10 day history of poison ivy.  He was working out in his yd and he did phone me and through the portal we prescribed some prednisone for 5 days which helped for the original rash on the right lower extremity but unfortunately he had a very heavy exposure load and was scratching in the middle of the night and now has an on both upper extremities left greater than right and continued on the left lower extremity as well.  We also did an A1c that showed a level of 5.6% and he has a family history of type 2 diabetes.  I did prescribe him by phone a 2nd dose of prednisone but for some reason the pharmacy did not have it and they did not have doxepin either.  He is using some over-the-counter ointment with fair results in the desire to scratch.   the patient's recent PSA came down to 2.0. He follows with dr edwards in [place].   He will get Medicare in January and will address his health maintenance and preventive healthcare at that time.    Review of Systems   Constitutional: Negative for activity change, diaphoresis, fatigue and fever.   HENT: Negative for congestion, ear pain, postnasal drip, sinus pressure, sore throat and trouble swallowing.    Eyes: Negative for pain.   Respiratory: Negative for cough, chest tightness, shortness of breath and wheezing.    Cardiovascular: Negative for chest pain, palpitations and leg  swelling.   Gastrointestinal: Negative for abdominal distention, abdominal pain, blood in stool, constipation and diarrhea.   Endocrine: Negative for cold intolerance and heat intolerance.   Genitourinary: Negative for decreased urine volume, difficulty urinating, dysuria, flank pain, frequency and hematuria.   Musculoskeletal: Negative for arthralgias, back pain, joint swelling, myalgias and neck pain.   Skin: Positive for color change and rash. Negative for pallor and wound.   Neurological: Negative for tremors, syncope, weakness, light-headedness, numbness and headaches.   Hematological: Negative for adenopathy.   Psychiatric/Behavioral: Negative for confusion, decreased concentration, hallucinations, self-injury, sleep disturbance and suicidal ideas. The patient is not nervous/anxious.        Past Medical History:   Diagnosis Date    Allergy     Hyperlipidemia     Hypertension     Prostate cancer        Past Surgical History:   Procedure Laterality Date    carpel tunnel      bilaterally    CYSTOSCOPY N/A 1/28/2019    Procedure: CYSTOSCOPY;  Surgeon: Delmy Hernandez MD;  Location: [facility] OR;  Service: Urology;  Laterality: N/A;    TRANSRECTAL BIOPSY OF PROSTATE WITH ULTRASOUND GUIDANCE N/A 1/28/2019    Procedure: BIOPSY, PROSTATE, RECTAL APPROACH, WITH US GUIDANCE;  Surgeon: Delmy Hernandez MD;  Location: [facility] OR;  Service: Urology;  Laterality: N/A;       Family History   Problem Relation Age of Onset    Cancer Mother     Asthma Mother     Diabetes Mother     Heart disease Mother     Hypertension Mother     Hypertension Father     Crohn's disease Brother     Cancer Maternal Grandmother        Social History     Socioeconomic History    Marital status:      Spouse name: Not on file    Number of children: Not on file    Years of education: Not on file    Highest education level: Not on file   Occupational History    Not on file   Social Needs    Financial resource  strain: Not on file    Food insecurity:     Worry: Not on file     Inability: Not on file    Transportation needs:     Medical: Not on file     Non-medical: Not on file   Tobacco Use    Smoking status: Former Smoker    Smokeless tobacco: Never Used    Tobacco comment: quit 40 yrs ago   Substance and Sexual Activity    Alcohol use: Yes     Comment: occasional    Drug use: No    Sexual activity: Yes     Partners: Female   Lifestyle    Physical activity:     Days per week: Not on file     Minutes per session: Not on file    Stress: Not at all   Relationships    Social connections:     Talks on phone: Not on file     Gets together: Not on file     Attends Gnosticist service: Not on file     Active member of club or organization: Not on file     Attends meetings of clubs or organizations: Not on file     Relationship status: Not on file   Other Topics Concern    Not on file   Social History Narrative    Not on file       Current Outpatient Medications   Medication Sig Dispense Refill    amlodipine-benazepril 5-20 mg (LOTREL) 5-20 mg per capsule Take 1 capsule by mouth once daily. 90 capsule 3    aspirin 81 MG Chew Take by mouth.      cyanocobalamin (VITAMIN B-12) 100 MCG tablet Take by mouth.      doxepin (SINEQUAN) 10 MG capsule Take 2 capsules (20 mg total) by mouth every evening. 30 capsule 0    doxepin (ZONALON) 5 % cream Apply topically 4 (four) times daily. 45 g 2    OMEGA-3 ACID ETHYL ESTERS ORAL Take by mouth.      simvastatin (ZOCOR) 40 MG tablet Take 1 tablet (40 mg total) by mouth once daily. 90 tablet 3    vitamin D (VITAMIN D3) 1000 units Tab Take 1,000 Units by mouth once daily.      hydrOXYzine HCl (ATARAX) 25 MG tablet Take 1 tablet (25 mg total) by mouth 3 (three) times daily as needed for Itching. 30 tablet 2    predniSONE (DELTASONE) 20 MG tablet 3 daily for 2 days , then 2 daily for 3 days then 1 daily for 5 days 20 tablet 0     No current facility-administered medications for  this visit.      Facility-Administered Medications Ordered in Other Visits   Medication Dose Route Frequency Provider Last Rate Last Dose    gentamicin 80 mg/100ml NACL IVPB IVPB 80 mg  80 mg Intravenous On Call Procedure Delmy Hernandez MD           Review of patient's allergies indicates:  No Known Allergies  Objective:      Physical Exam   Constitutional: He is oriented to person, place, and time. He appears well-developed and well-nourished. No distress.   HENT:   Head: Normocephalic and atraumatic.   Right Ear: External ear normal.   Left Ear: External ear normal.   Nose: Nose normal.   Mouth/Throat: Oropharynx is clear and moist.   Eyes: Conjunctivae and EOM are normal. Right eye exhibits no discharge. Left eye exhibits no discharge. No scleral icterus.   Neck: Normal range of motion. Neck supple. No JVD present. No tracheal deviation present. No thyromegaly present.   Cardiovascular: Normal rate, regular rhythm, normal heart sounds and intact distal pulses. Exam reveals no gallop.   No murmur heard.  Pulmonary/Chest: Effort normal and breath sounds normal. No respiratory distress. He has no wheezes. He has no rales.   Abdominal: Soft. Bowel sounds are normal. He exhibits no distension and no mass. There is no tenderness.   Musculoskeletal: Normal range of motion. He exhibits no edema or tenderness.   Lymphadenopathy:     He has no cervical adenopathy.   Neurological: He is alert and oriented to person, place, and time.   Skin: Skin is warm and dry. Rash noted. Rash is pustular and urticarial. He is not diaphoretic. No erythema.        C/w contact dermatitis - all areas at different stages   Psychiatric: He has a normal mood and affect. His behavior is normal. Judgment and thought content normal.     .LABS as per HPI form outside source  Assessment:       1. Dermatitis due to plants, including poison ivy, sumac, and oak    2. Poison ivy    3. Dyslipidemia    4. Essential (primary) hypertension         Plan:       Dermatitis due to plants, including poison ivy, sumac, and oak  -     hydrOXYzine HCl (ATARAX) 25 MG tablet; Take 1 tablet (25 mg total) by mouth 3 (three) times daily as needed for Itching.  Dispense: 30 tablet; Refill: 2    Poison ivy  -     predniSONE (DELTASONE) 20 MG tablet; 3 daily for 2 days , then 2 daily for 3 days then 1 daily for 5 days  Dispense: 20 tablet; Refill: 0  -     dexamethasone injection 8 mg    Dyslipidemia  -     simvastatin (ZOCOR) 40 MG tablet; Take 1 tablet (40 mg total) by mouth once daily.  Dispense: 90 tablet; Refill: 3    Essential (primary) hypertension  -     amlodipine-benazepril 5-20 mg (LOTREL) 5-20 mg per capsule; Take 1 capsule by mouth once daily.  Dispense: 90 capsule; Refill: 3

## 2020-01-14 DIAGNOSIS — R53.83 FATIGUE DUE TO SLEEP PATTERN DISTURBANCE: ICD-10-CM

## 2020-01-14 DIAGNOSIS — R06.83 SNORING: ICD-10-CM

## 2020-01-14 DIAGNOSIS — G47.33 OSA (OBSTRUCTIVE SLEEP APNEA): Primary | ICD-10-CM

## 2020-01-14 DIAGNOSIS — G47.19 EXCESSIVE DAYTIME SLEEPINESS: ICD-10-CM

## 2020-01-14 DIAGNOSIS — G47.9 FATIGUE DUE TO SLEEP PATTERN DISTURBANCE: ICD-10-CM

## 2020-01-20 DIAGNOSIS — E78.5 DYSLIPIDEMIA: ICD-10-CM

## 2020-01-20 DIAGNOSIS — I10 ESSENTIAL (PRIMARY) HYPERTENSION: ICD-10-CM

## 2020-01-21 RX ORDER — SIMVASTATIN 40 MG/1
40 TABLET, FILM COATED ORAL DAILY
Qty: 90 TABLET | Refills: 3 | Status: SHIPPED | OUTPATIENT
Start: 2020-01-21 | End: 2020-03-20 | Stop reason: SDUPTHER

## 2020-01-21 RX ORDER — AMLODIPINE AND BENAZEPRIL HYDROCHLORIDE 5; 20 MG/1; MG/1
1 CAPSULE ORAL DAILY
Qty: 30 CAPSULE | Refills: 0 | Status: SHIPPED | OUTPATIENT
Start: 2020-01-21 | End: 2020-03-20 | Stop reason: SDUPTHER

## 2020-01-31 ENCOUNTER — PROCEDURE VISIT (OUTPATIENT)
Dept: SLEEP MEDICINE | Facility: HOSPITAL | Age: 65
End: 2020-01-31
Attending: INTERNAL MEDICINE
Payer: MEDICARE

## 2020-01-31 DIAGNOSIS — G47.19 EXCESSIVE DAYTIME SLEEPINESS: ICD-10-CM

## 2020-01-31 DIAGNOSIS — R53.83 FATIGUE DUE TO SLEEP PATTERN DISTURBANCE: ICD-10-CM

## 2020-01-31 DIAGNOSIS — R06.83 SNORING: ICD-10-CM

## 2020-01-31 DIAGNOSIS — G47.9 FATIGUE DUE TO SLEEP PATTERN DISTURBANCE: ICD-10-CM

## 2020-01-31 DIAGNOSIS — G47.33 OSA (OBSTRUCTIVE SLEEP APNEA): ICD-10-CM

## 2020-01-31 PROCEDURE — 95811 POLYSOM 6/>YRS CPAP 4/> PARM: CPT

## 2020-03-20 ENCOUNTER — OFFICE VISIT (OUTPATIENT)
Dept: FAMILY MEDICINE | Facility: CLINIC | Age: 65
End: 2020-03-20
Payer: MEDICARE

## 2020-03-20 VITALS
HEART RATE: 60 BPM | SYSTOLIC BLOOD PRESSURE: 110 MMHG | WEIGHT: 294 LBS | TEMPERATURE: 99 F | DIASTOLIC BLOOD PRESSURE: 70 MMHG | HEIGHT: 69 IN | BODY MASS INDEX: 43.55 KG/M2 | OXYGEN SATURATION: 97 %

## 2020-03-20 DIAGNOSIS — I10 ESSENTIAL (PRIMARY) HYPERTENSION: Primary | ICD-10-CM

## 2020-03-20 DIAGNOSIS — H61.23 BILATERAL IMPACTED CERUMEN: ICD-10-CM

## 2020-03-20 DIAGNOSIS — E66.01 MORBID OBESITY: ICD-10-CM

## 2020-03-20 DIAGNOSIS — Z78.9 NON-SMOKER: ICD-10-CM

## 2020-03-20 DIAGNOSIS — C61 PROSTATE CANCER: ICD-10-CM

## 2020-03-20 DIAGNOSIS — G47.33 OBSTRUCTIVE SLEEP APNEA SYNDROME: ICD-10-CM

## 2020-03-20 DIAGNOSIS — E78.5 DYSLIPIDEMIA: ICD-10-CM

## 2020-03-20 DIAGNOSIS — R79.89 ABNORMAL THYROID BLOOD TEST: ICD-10-CM

## 2020-03-20 PROCEDURE — 3008F BODY MASS INDEX DOCD: CPT | Mod: S$GLB,,, | Performed by: NURSE PRACTITIONER

## 2020-03-20 PROCEDURE — 1100F PTFALLS ASSESS-DOCD GE2>/YR: CPT | Mod: S$GLB,,, | Performed by: NURSE PRACTITIONER

## 2020-03-20 PROCEDURE — 3074F SYST BP LT 130 MM HG: CPT | Mod: S$GLB,,, | Performed by: NURSE PRACTITIONER

## 2020-03-20 PROCEDURE — 3288F FALL RISK ASSESSMENT DOCD: CPT | Mod: S$GLB,,, | Performed by: NURSE PRACTITIONER

## 2020-03-20 PROCEDURE — 99214 PR OFFICE/OUTPT VISIT, EST, LEVL IV, 30-39 MIN: ICD-10-PCS | Mod: S$GLB,,, | Performed by: NURSE PRACTITIONER

## 2020-03-20 PROCEDURE — 3078F DIAST BP <80 MM HG: CPT | Mod: S$GLB,,, | Performed by: NURSE PRACTITIONER

## 2020-03-20 PROCEDURE — 3078F PR MOST RECENT DIASTOLIC BLOOD PRESSURE < 80 MM HG: ICD-10-PCS | Mod: S$GLB,,, | Performed by: NURSE PRACTITIONER

## 2020-03-20 PROCEDURE — 3008F PR BODY MASS INDEX (BMI) DOCUMENTED: ICD-10-PCS | Mod: S$GLB,,, | Performed by: NURSE PRACTITIONER

## 2020-03-20 PROCEDURE — 1100F PR PT FALLS ASSESS DOC 2+ FALLS/FALL W/INJURY/YR: ICD-10-PCS | Mod: S$GLB,,, | Performed by: NURSE PRACTITIONER

## 2020-03-20 PROCEDURE — 3288F PR FALLS RISK ASSESSMENT DOCUMENTED: ICD-10-PCS | Mod: S$GLB,,, | Performed by: NURSE PRACTITIONER

## 2020-03-20 PROCEDURE — 99214 OFFICE O/P EST MOD 30 MIN: CPT | Mod: S$GLB,,, | Performed by: NURSE PRACTITIONER

## 2020-03-20 PROCEDURE — 3074F PR MOST RECENT SYSTOLIC BLOOD PRESSURE < 130 MM HG: ICD-10-PCS | Mod: S$GLB,,, | Performed by: NURSE PRACTITIONER

## 2020-03-20 RX ORDER — AMLODIPINE AND BENAZEPRIL HYDROCHLORIDE 5; 20 MG/1; MG/1
1 CAPSULE ORAL DAILY
Qty: 90 CAPSULE | Refills: 1 | Status: SHIPPED | OUTPATIENT
Start: 2020-03-20 | End: 2020-09-04

## 2020-03-20 RX ORDER — SIMVASTATIN 40 MG/1
40 TABLET, FILM COATED ORAL DAILY
Qty: 90 TABLET | Refills: 1 | Status: SHIPPED | OUTPATIENT
Start: 2020-03-20 | End: 2020-04-08

## 2020-03-20 NOTE — PATIENT INSTRUCTIONS
Taking Your Blood Pressure  Blood pressure is the force of blood against the artery wall as it moves from the heart through the blood vessels. You can take your own blood pressure reading using a digital monitor. Take your readings the same each time, using the same arm. Take readings as often as your healthcare provider instructs.  About blood pressure monitors  Blood pressure monitors are designed for certain ages and cases. You can find monitors for older adults, for pregnant women, and for children. Make sure the one you choose is the right one for your age and situation.  The American Heart Association recommends an automatic cuff monitor that fits on your upper arm (bicep). The cuff should fit your arm size. A cuff thats too large or too small will not give an accurate reading. Measure around your upper arm to find your size.  Monitors that attach to your finger or wrist are not as accurate as monitors for your upper arm.  Ask your healthcare provider for help in choosing a monitor. Bring your monitor to your next provider visit if you need help in using it the correct way.  The steps below are general instructions for using an automatic digital monitor.  Step 1. Relax    · Take your blood pressure at the same time every day, such as in the morning or evening, or at the time your healthcare provider recommends.  · Wait at least a half-hour after smoking, eating, or exercising. Don't drink coffee, tea, soda, or other caffeinated beverages before checking your blood pressure.  · Sit comfortably at a table with both feet on the floor. Do not cross your legs or feet. Place the monitor near you.  · Rest for a few minutes before you begin.  Step 2. Wrap the cuff    · Place your arm on the table, palm up. Your arm should be at the level of your heart. Wrap the cuff around your upper arm, just above your elbow. Its best done on bare skin, not over clothing. Most cuffs will indicate where the brachial artery (the  "blood vessel in the middle of the arm at the inner side of the elbow) should line up with the cuff. Look in your monitor's instruction booklet for an illustration. You can also bring your cuff to your healthcare provider and have them show you how to correctly place the cuff.  Step 3. Inflate the cuff    · Push the button that starts the pump.  · The cuff will tighten, then loosen.  · The numbers will change. When they stop changing, your blood pressure reading will appear.  · Take 2 or 3 readings one minute apart.  Step 4. Write down the results of each reading    · Write down your blood pressure numbers for each reading. Note the date and time. Keep your results in one place, such as a notebook. Even if your monitor has a built-in memory, keep a hard copy of the readings.  · Remove the cuff from your arm. Turn off the machine.  · Bring your blood pressure records with your healthcare providers at each visit.  · If you start a new blood pressure medicine, note the day you started the new medicine. Also note the day if you change the dose of your medicine. This information goes on your blood pressure recording sheet. This will help your healthcare provider monitor how well the medicine changes are working.  · Ask your healthcare provider what numbers should prompt you to call him or her. Also ask what numbers should prompt you to get help right away.  Date Last Reviewed: 11/1/2016 © 2000-2017 [employer]. [address] [place] [address]. All rights reserved. This information is not intended as a substitute for professional medical care. Always follow your healthcare professional's instructions.        Facts About Dietary Fat     Olive oil is a good source of unsaturated fat.     Eating less saturated and trans fat is one of the best things you can do for your heart. Start by finding out which fats are better to use. Then always try to use as little "bad" fat as you can.  Why eat less " fat?  · Cutting down on the fat you eat can lower your blood cholesterol levels. This may help prevent clogged arteries from buildup of plaque.  · A low-fat diet can help you lose excess weight. Doing so can lower your blood pressure and reduce your chances of getting diabetes.  · A low-fat diet reduces your risk for stroke and for some cancers.  Unsaturated fat is most healthy  · When you must add fat, use unsaturated fat.  · Unsaturated fats come from plants. They include olive, canola, peanut, corn, avocado, safflower, and sunflower oils.  · Liquid (squeezable) margarine is also mostly unsaturated fat.  · In moderate amounts, unsaturated fat can even be good for your heart.  Saturated fat is less healthy  · Avoid eating saturated fat because it raises your blood cholesterol levels.  · Most saturated fat comes from animals. Foods such as butter, lard, cheese, cream, whole milk, and fatty cuts of meat are high in saturated fat.  · Some oils, such as palm and coconut oils, are also saturated fats.  Trans fat is least healthy  · Also avoid trans fat whenever possible. Even if it's not listed on the food label, look for it in the ingredients in the form of hydrogenated or partially hydrogenated oils.  · This is found in snack foods, shortening, french fries, and stick margarines.  Add flavor without fat  · Sprinkle herbs on fish, chicken, and meat, and in soups.  · Try herbs, lemon juice, or flavored vinegar on vegetables.  · Add chopped onions, garlic, and peppers to flavor beans and rice.   Date Last Reviewed: 5/11/2015  © 8016-8891 [employer]. [address] [place] [address], [place] [address]. All rights reserved. This information is not intended as a substitute for professional medical care. Always follow your healthcare professional's instructions.

## 2020-04-07 LAB
ALBUMIN SERPL-MCNC: 4.4 G/DL (ref 3.6–5.1)
ALBUMIN/GLOB SERPL: 2.1 (CALC) (ref 1–2.5)
ALP SERPL-CCNC: 75 U/L (ref 35–144)
ALT SERPL-CCNC: 16 U/L (ref 9–46)
APPEARANCE UR: CLEAR
AST SERPL-CCNC: 12 U/L (ref 10–35)
BASOPHILS # BLD AUTO: 30 CELLS/UL (ref 0–200)
BASOPHILS NFR BLD AUTO: 0.6 %
BILIRUB SERPL-MCNC: 1.4 MG/DL (ref 0.2–1.2)
BILIRUB UR QL STRIP: NEGATIVE
BUN SERPL-MCNC: 16 MG/DL (ref 7–25)
BUN/CREAT SERPL: ABNORMAL (CALC) (ref 6–22)
CALCIUM SERPL-MCNC: 9.4 MG/DL (ref 8.6–10.3)
CHLORIDE SERPL-SCNC: 104 MMOL/L (ref 98–110)
CHOLEST SERPL-MCNC: 132 MG/DL
CHOLEST/HDLC SERPL: 4 (CALC)
CO2 SERPL-SCNC: 28 MMOL/L (ref 20–32)
COLOR UR: YELLOW
CREAT SERPL-MCNC: 0.99 MG/DL (ref 0.7–1.25)
EOSINOPHIL # BLD AUTO: 130 CELLS/UL (ref 15–500)
EOSINOPHIL NFR BLD AUTO: 2.6 %
ERYTHROCYTE [DISTWIDTH] IN BLOOD BY AUTOMATED COUNT: 13.4 % (ref 11–15)
GFRSERPLBLD MDRD-ARVRAT: 80 ML/MIN/1.73M2
GLOBULIN SER CALC-MCNC: 2.1 G/DL (CALC) (ref 1.9–3.7)
GLUCOSE SERPL-MCNC: 107 MG/DL (ref 65–99)
GLUCOSE UR QL STRIP: NEGATIVE
HCT VFR BLD AUTO: 47.8 % (ref 38.5–50)
HDLC SERPL-MCNC: 33 MG/DL
HGB BLD-MCNC: 15.8 G/DL (ref 13.2–17.1)
HGB UR QL STRIP: NEGATIVE
KETONES UR QL STRIP: NEGATIVE
LDLC SERPL CALC-MCNC: 82 MG/DL (CALC)
LEUKOCYTE ESTERASE UR QL STRIP: NEGATIVE
LYMPHOCYTES # BLD AUTO: 1240 CELLS/UL (ref 850–3900)
LYMPHOCYTES NFR BLD AUTO: 24.8 %
MCH RBC QN AUTO: 29.3 PG (ref 27–33)
MCHC RBC AUTO-ENTMCNC: 33.1 G/DL (ref 32–36)
MCV RBC AUTO: 88.5 FL (ref 80–100)
MONOCYTES # BLD AUTO: 395 CELLS/UL (ref 200–950)
MONOCYTES NFR BLD AUTO: 7.9 %
NEUTROPHILS # BLD AUTO: 3205 CELLS/UL (ref 1500–7800)
NEUTROPHILS NFR BLD AUTO: 64.1 %
NITRITE UR QL STRIP: NEGATIVE
NONHDLC SERPL-MCNC: 99 MG/DL (CALC)
PH UR STRIP: NORMAL [PH] (ref 5–8)
PLATELET # BLD AUTO: 168 THOUSAND/UL (ref 140–400)
PMV BLD REES-ECKER: 11.2 FL (ref 7.5–12.5)
POTASSIUM SERPL-SCNC: 4 MMOL/L (ref 3.5–5.3)
PROT SERPL-MCNC: 6.5 G/DL (ref 6.1–8.1)
PROT UR QL STRIP: NEGATIVE
RBC # BLD AUTO: 5.4 MILLION/UL (ref 4.2–5.8)
SODIUM SERPL-SCNC: 141 MMOL/L (ref 135–146)
SP GR UR STRIP: 1.02 (ref 1–1.03)
TRIGL SERPL-MCNC: 89 MG/DL
TSH SERPL-ACNC: 5.73 MIU/L (ref 0.4–4.5)
WBC # BLD AUTO: 5 THOUSAND/UL (ref 3.8–10.8)

## 2020-04-08 ENCOUNTER — TELEPHONE (OUTPATIENT)
Dept: FAMILY MEDICINE | Facility: CLINIC | Age: 65
End: 2020-04-08

## 2020-04-08 DIAGNOSIS — E78.5 DYSLIPIDEMIA: Primary | ICD-10-CM

## 2020-04-08 RX ORDER — ATORVASTATIN CALCIUM 20 MG/1
20 TABLET, FILM COATED ORAL NIGHTLY
Qty: 90 TABLET | Refills: 1 | Status: SHIPPED | OUTPATIENT
Start: 2020-04-08 | End: 2020-05-19 | Stop reason: SDUPTHER

## 2020-04-08 NOTE — TELEPHONE ENCOUNTER
----- Message from Alba Boone LPN sent at 4/6/2020 11:11 AM CDT -----      ----- Message -----  From: Danielle Sahu  Sent: 4/5/2020  12:49 PM CDT  To: Irina Abreu Staff    PT. SAFETY & HEALTH CONSIDERATION, 4/03/20

## 2020-04-08 NOTE — TELEPHONE ENCOUNTER
Stop simvastatin 40 mg due to possible medication reaction with blood pressure medication.  Changing medication to atorvastatin 20 mg.  Medication is being sent to the pharmacy.

## 2020-04-15 ENCOUNTER — PATIENT MESSAGE (OUTPATIENT)
Dept: FAMILY MEDICINE | Facility: CLINIC | Age: 65
End: 2020-04-15

## 2020-05-15 ENCOUNTER — CLINICAL SUPPORT (OUTPATIENT)
Dept: URGENT CARE | Facility: CLINIC | Age: 65
End: 2020-05-15
Payer: MEDICARE

## 2020-05-15 DIAGNOSIS — C61 MALIGNANT NEOPLASM OF PROSTATE: ICD-10-CM

## 2020-05-15 PROCEDURE — U0003 INFECTIOUS AGENT DETECTION BY NUCLEIC ACID (DNA OR RNA); SEVERE ACUTE RESPIRATORY SYNDROME CORONAVIRUS 2 (SARS-COV-2) (CORONAVIRUS DISEASE [COVID-19]), AMPLIFIED PROBE TECHNIQUE, MAKING USE OF HIGH THROUGHPUT TECHNOLOGIES AS DESCRIBED BY CMS-2020-01-R: HCPCS

## 2020-05-17 LAB — SARS-COV-2 RNA RESP QL NAA+PROBE: NOT DETECTED

## 2020-05-19 DIAGNOSIS — E78.5 DYSLIPIDEMIA: ICD-10-CM

## 2020-05-19 RX ORDER — ATORVASTATIN CALCIUM 20 MG/1
20 TABLET, FILM COATED ORAL NIGHTLY
Qty: 90 TABLET | Refills: 1 | Status: SHIPPED | OUTPATIENT
Start: 2020-05-19 | End: 2020-09-29 | Stop reason: SDUPTHER

## 2020-05-20 PROBLEM — R97.20 ELEVATED PSA: Status: ACTIVE | Noted: 2020-05-20

## 2020-06-12 ENCOUNTER — TELEPHONE (OUTPATIENT)
Dept: UROLOGY | Facility: CLINIC | Age: 65
End: 2020-06-12

## 2020-06-12 NOTE — TELEPHONE ENCOUNTER
----- Message from Velia Dominguez sent at 6/12/2020  7:54 AM CDT -----  Contact: Ann-Marie  Type: Needs Medical Advice  Who Called:  Patient's wife Ann-Marie  Symptoms (please be specific):    How long has patient had these symptoms:    Pharmacy name and phone #:    Best Call Back Number:   Additional Information: requesting a call back from  to discuss patient's progression

## 2020-06-12 NOTE — TELEPHONE ENCOUNTER
Patient's wife reports that Dr. Gold did call her  and apologized for how hikatia treated his wife.   Patient's wife is still kind of upset that MD did not call her himself.    She is concerned that f/u appt is not until 7/13 and that surgery will be a while after that.  She is not happy with waiting months, as 7/1 will be 8 weeks from bx already.  Advised patient's wife to contact Dr. Gold's office to see if planning appt can be moved up.  She will call them to see if can reschedule to sooner.

## 2020-06-12 NOTE — TELEPHONE ENCOUNTER
Wife calling about nathalie Hernandez recommended he have prostatectomy within 6 months.   See by Dr. Gold  Biopsy done. Cancer diagnosis.    Blood in semen.  Advised that this is normal for about 6 weeks after a prostate bx.  Increase fluids.   Also, other times, when ejaculates, there is no fluid.    Wife feels that Dr. Gold is not addressing her concerns.    Wants them to follow up in one month to discuss surgery.  Wife and patient feels this is too long to wait.  Patient and wife are not feeling so comfortable with waiting, as she is concerned that the cancer will spread.  Concerned that additional testing has not been done.    Has better insurance now.    She would like to talk to Dr. Hernandez about this.  Is asking for a phone call.

## 2020-07-11 ENCOUNTER — PATIENT MESSAGE (OUTPATIENT)
Dept: UROLOGY | Facility: CLINIC | Age: 65
End: 2020-07-11

## 2020-07-13 ENCOUNTER — OFFICE VISIT (OUTPATIENT)
Dept: PRIMARY CARE CLINIC | Facility: CLINIC | Age: 65
End: 2020-07-13
Payer: MEDICARE

## 2020-07-13 ENCOUNTER — PATIENT MESSAGE (OUTPATIENT)
Dept: UROLOGY | Facility: CLINIC | Age: 65
End: 2020-07-13

## 2020-07-13 VITALS
SYSTOLIC BLOOD PRESSURE: 132 MMHG | OXYGEN SATURATION: 96 % | RESPIRATION RATE: 20 BRPM | DIASTOLIC BLOOD PRESSURE: 87 MMHG | TEMPERATURE: 98 F | HEART RATE: 100 BPM

## 2020-07-13 DIAGNOSIS — R05.9 COUGH: ICD-10-CM

## 2020-07-13 PROCEDURE — 3288F FALL RISK ASSESSMENT DOCD: CPT | Mod: S$GLB,,, | Performed by: EMERGENCY MEDICINE

## 2020-07-13 PROCEDURE — 3075F PR MOST RECENT SYSTOLIC BLOOD PRESS GE 130-139MM HG: ICD-10-PCS | Mod: S$GLB,,, | Performed by: EMERGENCY MEDICINE

## 2020-07-13 PROCEDURE — U0003 INFECTIOUS AGENT DETECTION BY NUCLEIC ACID (DNA OR RNA); SEVERE ACUTE RESPIRATORY SYNDROME CORONAVIRUS 2 (SARS-COV-2) (CORONAVIRUS DISEASE [COVID-19]), AMPLIFIED PROBE TECHNIQUE, MAKING USE OF HIGH THROUGHPUT TECHNOLOGIES AS DESCRIBED BY CMS-2020-01-R: HCPCS

## 2020-07-13 PROCEDURE — 3288F PR FALLS RISK ASSESSMENT DOCUMENTED: ICD-10-PCS | Mod: S$GLB,,, | Performed by: EMERGENCY MEDICINE

## 2020-07-13 PROCEDURE — 1100F PR PT FALLS ASSESS DOC 2+ FALLS/FALL W/INJURY/YR: ICD-10-PCS | Mod: S$GLB,,, | Performed by: EMERGENCY MEDICINE

## 2020-07-13 PROCEDURE — 3079F DIAST BP 80-89 MM HG: CPT | Mod: S$GLB,,, | Performed by: EMERGENCY MEDICINE

## 2020-07-13 PROCEDURE — 99203 PR OFFICE/OUTPT VISIT, NEW, LEVL III, 30-44 MIN: ICD-10-PCS | Mod: S$GLB,,, | Performed by: EMERGENCY MEDICINE

## 2020-07-13 PROCEDURE — 1100F PTFALLS ASSESS-DOCD GE2>/YR: CPT | Mod: S$GLB,,, | Performed by: EMERGENCY MEDICINE

## 2020-07-13 PROCEDURE — 3079F PR MOST RECENT DIASTOLIC BLOOD PRESSURE 80-89 MM HG: ICD-10-PCS | Mod: S$GLB,,, | Performed by: EMERGENCY MEDICINE

## 2020-07-13 PROCEDURE — 99203 OFFICE O/P NEW LOW 30 MIN: CPT | Mod: S$GLB,,, | Performed by: EMERGENCY MEDICINE

## 2020-07-13 PROCEDURE — 3075F SYST BP GE 130 - 139MM HG: CPT | Mod: S$GLB,,, | Performed by: EMERGENCY MEDICINE

## 2020-07-13 NOTE — PROGRESS NOTES
Subjective:        Time seen by provider: 1:42 PM on 07/13/2020    Marcus Parisi is a 65 y.o. male with PMHx of HLD and HTN who presents for an evaluation of possible COVID-19. The patient c/o fever, cough, generalized body aches, HA, and diarrhea x 4 days. He denies any other symptoms at this time. No pertinent PSHx. Patient is a former smoker.    Review of Systems   Constitutional: Positive for fever. Negative for activity change, appetite change and fatigue.   HENT: Negative for congestion, rhinorrhea and sore throat.    Respiratory: Positive for cough. Negative for chest tightness, shortness of breath and wheezing.    Cardiovascular: Negative for chest pain and palpitations.   Gastrointestinal: Positive for diarrhea. Negative for nausea and vomiting.   Musculoskeletal: Positive for myalgias (generalized). Negative for arthralgias.   Skin: Negative for rash.   Neurological: Positive for headaches. Negative for weakness and light-headedness.       Objective:      Physical Exam  Vitals signs and nursing note reviewed.   Constitutional:       General: He is not in acute distress.     Appearance: He is well-developed. He is not diaphoretic.   HENT:      Head: Normocephalic and atraumatic.      Nose: Nose normal.   Eyes:      Conjunctiva/sclera: Conjunctivae normal.   Neck:      Musculoskeletal: Normal range of motion.   Cardiovascular:      Rate and Rhythm: Normal rate and regular rhythm.      Heart sounds: Normal heart sounds. No murmur.   Pulmonary:      Effort: Pulmonary effort is normal. No respiratory distress.      Breath sounds: Normal breath sounds. No wheezing.   Musculoskeletal: Normal range of motion.   Skin:     General: Skin is warm and dry.   Neurological:      Mental Status: He is alert and oriented to person, place, and time.         Assessment:       1. Suspected COVID-19 Virus infection  Plan:       1. Suspected COVID-19 Virus infection  2. Discharge home and await results.   3. Return to clinic or ED  for new or worsening symptoms.   4. Follow-up with PCP as needed.     Scribe Attestation:   I, Sonja Brar, am scribing for, and in the presence of, LIZ Lara. I performed the above scribed service and the documentation accurately describes the services I performed. I attest to the accuracy of the note.    I, LIZ Lara, personally performed the services described in this documentation. All medical record entries made by the scribe were at my direction and in my presence.  I have reviewed the chart and agree that the record reflects my personal performance and is accurate and complete. LIZ Lara.  2:32 PM 07/13/2020

## 2020-07-13 NOTE — PATIENT INSTRUCTIONS

## 2020-07-14 ENCOUNTER — PATIENT MESSAGE (OUTPATIENT)
Dept: FAMILY MEDICINE | Facility: CLINIC | Age: 65
End: 2020-07-14

## 2020-07-14 LAB — SARS-COV-2 RNA RESP QL NAA+PROBE: NOT DETECTED

## 2020-07-23 ENCOUNTER — TELEPHONE (OUTPATIENT)
Dept: FAMILY MEDICINE | Facility: CLINIC | Age: 65
End: 2020-07-23

## 2020-07-28 ENCOUNTER — OFFICE VISIT (OUTPATIENT)
Dept: FAMILY MEDICINE | Facility: CLINIC | Age: 65
End: 2020-07-28
Payer: MEDICARE

## 2020-07-28 VITALS
OXYGEN SATURATION: 99 % | DIASTOLIC BLOOD PRESSURE: 74 MMHG | HEIGHT: 68 IN | HEART RATE: 97 BPM | SYSTOLIC BLOOD PRESSURE: 120 MMHG | TEMPERATURE: 99 F | BODY MASS INDEX: 43.04 KG/M2 | WEIGHT: 284 LBS

## 2020-07-28 DIAGNOSIS — I48.0 PAROXYSMAL ATRIAL FIBRILLATION: ICD-10-CM

## 2020-07-28 DIAGNOSIS — C61 PROSTATE CANCER: ICD-10-CM

## 2020-07-28 DIAGNOSIS — Z01.818 PREOPERATIVE CLEARANCE: Primary | ICD-10-CM

## 2020-07-28 DIAGNOSIS — I10 ESSENTIAL (PRIMARY) HYPERTENSION: ICD-10-CM

## 2020-07-28 DIAGNOSIS — E66.01 CLASS 3 SEVERE OBESITY DUE TO EXCESS CALORIES WITH SERIOUS COMORBIDITY AND BODY MASS INDEX (BMI) OF 40.0 TO 44.9 IN ADULT: ICD-10-CM

## 2020-07-28 PROCEDURE — 1101F PR PT FALLS ASSESS DOC 0-1 FALLS W/OUT INJ PAST YR: ICD-10-PCS | Mod: S$GLB,,, | Performed by: NURSE PRACTITIONER

## 2020-07-28 PROCEDURE — 99214 OFFICE O/P EST MOD 30 MIN: CPT | Mod: S$GLB,,, | Performed by: NURSE PRACTITIONER

## 2020-07-28 PROCEDURE — 3078F DIAST BP <80 MM HG: CPT | Mod: S$GLB,,, | Performed by: NURSE PRACTITIONER

## 2020-07-28 PROCEDURE — 3074F SYST BP LT 130 MM HG: CPT | Mod: S$GLB,,, | Performed by: NURSE PRACTITIONER

## 2020-07-28 PROCEDURE — 99214 PR OFFICE/OUTPT VISIT, EST, LEVL IV, 30-39 MIN: ICD-10-PCS | Mod: S$GLB,,, | Performed by: NURSE PRACTITIONER

## 2020-07-28 PROCEDURE — 3078F PR MOST RECENT DIASTOLIC BLOOD PRESSURE < 80 MM HG: ICD-10-PCS | Mod: S$GLB,,, | Performed by: NURSE PRACTITIONER

## 2020-07-28 PROCEDURE — 3008F PR BODY MASS INDEX (BMI) DOCUMENTED: ICD-10-PCS | Mod: S$GLB,,, | Performed by: NURSE PRACTITIONER

## 2020-07-28 PROCEDURE — 1101F PT FALLS ASSESS-DOCD LE1/YR: CPT | Mod: S$GLB,,, | Performed by: NURSE PRACTITIONER

## 2020-07-28 PROCEDURE — 3008F BODY MASS INDEX DOCD: CPT | Mod: S$GLB,,, | Performed by: NURSE PRACTITIONER

## 2020-07-28 PROCEDURE — 3074F PR MOST RECENT SYSTOLIC BLOOD PRESSURE < 130 MM HG: ICD-10-PCS | Mod: S$GLB,,, | Performed by: NURSE PRACTITIONER

## 2020-07-28 NOTE — PROGRESS NOTES
Subjective:       Patient ID: Marcus Parisi is a 65 y.o. male.    Chief Complaint: Pre-op Exam (prostatectomy-- Dr. Gold)    Patient presents with:  Pre-op Exam: prostatectomy-- Dr. Gold       Subjective:    Marcus Parisi is a 65 y.o. male who presents to the office today for a preoperative consultation at the request of surgeon Dr. Gold who plans on performing robotic prostatectomy on July 30, 2020. This consultation is requested for the specific conditions prompting preoperative evaluation (i.e. because of potential affect on operative risk): Atrial fibrillation. Planned anesthesia: general. The patient has the following known anesthesia issues: none. Patients bleeding risk: no recent abnormal bleeding. Patient does not have objections to receiving blood products if needed.    Procedure Risk: high  Recent Procedures: Echocardiogram, EKG  Recent infections: none    Heart Disease:  Angina history No  MI>6month No If yes then 5 points  MI<6 months No If yes then 10 points  Unstable Angina No If yes then 10 points  Class III No If yes then 10 points  Class IV No If yes then 20 points  Revasc <5years No and asymptomatic No  If symptomatic then needs restudy.  Pulmonary Edema history No If yes then 5 points  Pulmonary Edema <7days No If yes then 10 points  Critical Aortic Stenosis No If yes then 20 points  Abnormal EKG Yes, New A-Fib  PACs No If yes 5 points        General poor health No If yes then 5 points  Age > 70 No If yes then 10 points  CVA No  HTN Yes  COPD No Consider PFTs prior to thoracotomy  Liver Failure/Alcohol withdrawal No  Anemia No    If any yes in categories greater than or equal to 10 points marked yes, procedure should be delayed, angiography and risk-factor modification should be considered. If the presence of other clinical predictors then stress test prior to procedure.   Points total 10   0-15 = low, 15-30 = intermediate, 31+ = high    Cardiographics  ECG: Abnormal, new onset  A-fib  Echocardiogram: Abnormal, A-fib noted    Imaging  Chest x-ray: normal     Lab Review   If large blood loss possible during surgery then CBC pre-op  Office Visit on 07/24/2020  BSA                       Value: 2.5(m2)            Dt: 07/27/2020  TDI SEPTAL                Value: 0.09(m/s)          Dt: 07/27/2020  LV LATERAL E/E' RATIO     Value: 9.00(m/s)          Dt: 07/27/2020  LV SEPTAL E/E' RATIO      Value: 10.00(m/s)         Dt: 07/27/2020  LA WIDTH                  Value: 4.00(cm)           Dt: 07/27/2020  AORTIC VALVE CUSP SEPERA* Value: 2(cm)              Dt: 07/27/2020  TDI LATERAL               Value: 0.10(m/s)          Dt: 07/27/2020  LVIDD                     Value: 5.40(cm)           Dt: 07/27/2020  IVS                       Value: 1.20(cm)           Dt: 07/27/2020  PW                        Value: 1.10(cm)           Dt: 07/27/2020  LVIDS                     Value: 3.80(cm)           Dt: 07/27/2020  FS                        Value: 30(%)              Dt: 07/27/2020  LA volume                 Value: 83.98(cm3)         Dt: 07/27/2020  Sinus                     Value: 3.50(cm)           Dt: 07/27/2020  LV mass                   Value: 249.43(g)          Dt: 07/27/2020  LA size                   Value: 4.80(cm)           Dt: 07/27/2020  TV peak E gustavo             Value: 0.50(m/s)          Dt: 07/27/2020  Left Ventricle Relative * Value: 0.41(cm)           Dt: 07/27/2020  Mean e'                   Value: 0.10(m/s)          Dt: 07/27/2020  E wave decelartion time   Value: 173.00(msec)       Dt: 07/27/2020  Ao peak gustavo               Value: 1.3(m/s)           Dt: 07/27/2020  AV peak gradient          Value: 7(mmHg)            Dt: 07/27/2020  E/E' ratio                Value: 9.47(m/s)          Dt: 07/27/2020  MV Peak E Gustavo             Value: 0.90(m/s)          Dt: 07/27/2020  TR Max Gustavo                Value: 2.20(m/s)          Dt: 07/27/2020  PV Peak S Gustavo             Value: 0.80(m/s)          Dt:  07/27/2020  LA Volume Index           Value: 35.2(mL/m2)        Dt: 07/27/2020  LV Mass Index             Value: 105(g/m2)          Dt: 07/27/2020  Left Atrium Minor Axis    Value: 5.00(cm)           Dt: 07/27/2020  Left Atrium Major Axis    Value: 5.30(cm)           Dt: 07/27/2020  Triscuspid Valve Regurgi* Value: 19(mmHg)           Dt: 07/27/2020  Right Atrial Pressure (f* Value: 3(mmHg)            Dt: 07/27/2020  TV rest pulmonary artery* Value: 22(mmHg)           Dt: 07/27/2020  ------------  Hospital Outpatient Visit on 07/23/2020  WBC                       Value: 4.89(K/uL)         Dt: 07/23/2020  RBC                       Value: 5.21(M/uL)         Dt: 07/23/2020  Hemoglobin                Value: 15.5(g/dL)         Dt: 07/23/2020  Hematocrit                Value: 46.8(%)            Dt: 07/23/2020  Mean Corpuscular Volume   Value: 90(fL)             Dt: 07/23/2020  Mean Corpuscular Hemoglo* Value: 29.8(pg)           Dt: 07/23/2020  Mean Corpuscular Hemoglo* Value: 33.1(g/dL)         Dt: 07/23/2020  RDW                       Value: 14.6(%)*           Dt: 07/23/2020  Platelets                 Value: 133(K/uL)*         Dt: 07/23/2020  MPV                       Value: 12.2(fL)           Dt: 07/23/2020  Sodium                    Value: 137(mmol/L)        Dt: 07/23/2020  Potassium                 Value: 4.1(mmol/L)        Dt: 07/23/2020  Chloride                  Value: 103(mmol/L)        Dt: 07/23/2020  CO2                       Value: 27(mmol/L)         Dt: 07/23/2020  Glucose                   Value: 112(mg/dL)*        Dt: 07/23/2020  BUN, Bld                  Value: 14(mg/dL)          Dt: 07/23/2020  Creatinine                Value: 0.81(mg/dL)        Dt: 07/23/2020  Calcium                   Value: 9.1(mg/dL)         Dt: 07/23/2020  Anion Gap                 Value: 7(mmol/L)*         Dt: 07/23/2020  eGFR if African American  Value: >60(mL/min/1.73 m^2) Dt: 07/23/2020  eGFR if non African Amer* Value:  >60(mL/min/1.73 m^2) Dt: 07/23/2020  aPTT                      Value: 27.3(sec)          Dt: 07/23/2020  PT                        Value: 13.5(sec)          Dt: 07/23/2020  INR                       Value: 1.1                Dt: 07/23/2020  SARS-CoV2 (COVID-19) Earl*                           Dt: 07/23/2020                  Value: Not Detected   ------------  Office Visit on 07/13/2020  SARS-CoV2 (COVID-19) Earl*                           Dt: 07/13/2020                  Value: Not Detected   ------------     Assessment:      65 y.o. male with planned surgery as above.    Known risk factors for perioperative complications: None  Atrial fibrillation     Difficulty with intubation is not anticipated.    Cardiac Risk Estimation: high     Plan:    1. Preoperative workup as follows chest x-ray, hemoglobin, hematocrit, electrolytes, glucose.   2. Medications reviewed. Change in medication regimen before surgery: none, continue medication regimen including morning of surgery, with sip of water.   3. Prophylaxis for cardiac events with perioperative beta-blockers: should be considered, specific regimen per anesthesia. Caution in patients with COPD.   4. Invasive hemodynamic monitoring perioperatively: at the discretion of anesthesiologist.  5. Deep vein thrombosis prophylaxis postoperatively:regimen to be chosen by surgical team.  6. Other measures: Preoperative alcohol abstention x 30 days.  7. Discuss with team  8. No follow-ups on file.      As with all procedures, there is inherent risk of multiple complications including bleeding,  infectious, cardiac, and pulmonary.  Please stop Aspirin and NSAIDS one week prior to surgery.    All smokers should quit smoking eight or more weeks prior to procedure to minimize complications associated with smoking. Reduction or cessation of smoking for less than four to eight weeks before surgery is of questionable benefit, and has actually been shown in some studies to result in higher  complication rates.  Alcohol should be used in moderation.  Any pt with cardiopulmonary disease may warrant repeat examination prior to procedure along with directions for deep breathing exercises and incentive spirometry.    Patients at high risk for complications usually warrant cardiology consultation and possibly angiography. Cardiac stress testing should be performed in patients at intermediate risk and with poor functional capacity or who are undergoing high-risk procedures, such as vascular surgery. For patients with minor clinical predictors, only patients who have poor functional capacity and are undergoing a high-risk procedure require stress testing. Patients with positive stress test results warrant cardiology consultation before proceeding with surgery.  Predisposing risk factors for pulmonary complications include cough, dyspnea, smoking, a history of lung disease, obesity and abdominal or thoracic surgery.    Any pts > 70 years old may be at risk for delirium or cardiac complications.  Furthermore, diabetic patients may be at risk for infection or prolonged healing.      Lois Laureano  07/28/2020 1:57 PM    Review of Systems   Constitutional: Negative for activity change, appetite change, chills, diaphoresis, fatigue, fever and unexpected weight change.        Obesity   HENT: Negative for congestion, ear discharge, ear pain, hearing loss, rhinorrhea, sore throat, trouble swallowing and voice change.    Eyes: Negative for photophobia, pain, discharge and visual disturbance.   Respiratory: Negative for cough, chest tightness, shortness of breath and wheezing.    Cardiovascular: Negative for chest pain, palpitations and leg swelling.        New atrial fibrillation, htn   Gastrointestinal: Negative for abdominal pain, blood in stool, constipation, diarrhea, nausea and vomiting.   Endocrine: Negative for cold intolerance, heat intolerance, polydipsia and polyuria.   Genitourinary: Negative for  difficulty urinating, dysuria, flank pain, hematuria and urgency.   Musculoskeletal: Negative for arthralgias, gait problem, joint swelling, myalgias and neck pain.   Skin: Negative for rash.   Allergic/Immunologic: Negative for immunocompromised state.   Neurological: Negative for dizziness, weakness and headaches.   Hematological: Negative for adenopathy.   Psychiatric/Behavioral: Negative for agitation, confusion, dysphoric mood, self-injury and suicidal ideas.       Past Medical History:   Diagnosis Date    Allergy     Anticoagulant long-term use     Arthritis     Asthma     as a child    Hyperlipidemia     Hypertension     Prostate cancer 2019    Sleep apnea     Use CPAP    Sleep apnea     uses C-PAP      Past Surgical History:   Procedure Laterality Date    carpel tunnel      bilaterally    CYSTOSCOPY N/A 1/28/2019    Procedure: CYSTOSCOPY;  Surgeon: Delmy Hernandez MD;  Location: [facility] OR;  Service: Urology;  Laterality: N/A;    TRANSRECTAL BIOPSY OF PROSTATE WITH ULTRASOUND GUIDANCE N/A 1/28/2019    Procedure: BIOPSY, PROSTATE, RECTAL APPROACH, WITH US GUIDANCE;  Surgeon: Delmy Hernandez MD;  Location: [facility] OR;  Service: Urology;  Laterality: N/A;       Family History   Problem Relation Age of Onset    Cancer Mother     Asthma Mother     Diabetes Mother     Heart disease Mother     Hypertension Mother     Hypertension Father     Crohn's disease Brother     Cancer Maternal Grandmother        Social History     Socioeconomic History    Marital status:      Spouse name: Not on file    Number of children: Not on file    Years of education: Not on file    Highest education level: Not on file   Occupational History    Not on file   Social Needs    Financial resource strain: Not hard at all    Food insecurity     Worry: Never true     Inability: Never true    Transportation needs     Medical: No     Non-medical: No   Tobacco Use    Smoking status: Former Smoker      Packs/day: 3.00     Years: 5.00     Pack years: 15.00     Types: Cigarettes     Quit date:      Years since quittin.6    Smokeless tobacco: Current User     Types: Chew    Tobacco comment: quit 40 yrs ago   Substance and Sexual Activity    Alcohol use: Yes     Frequency: Never     Drinks per session: Patient refused     Binge frequency: Never     Comment: occasional    Drug use: No    Sexual activity: Yes     Partners: Female   Lifestyle    Physical activity     Days per week: 0 days     Minutes per session: 0 min    Stress: Not at all   Relationships    Social connections     Talks on phone: Twice a week     Gets together: Never     Attends [employer] service: Not on file     Active member of club or organization: Yes     Attends meetings of clubs or organizations: Never     Relationship status:    Other Topics Concern    Not on file   Social History Narrative    Not on file       Current Outpatient Medications   Medication Sig Dispense Refill    amlodipine-benazepril 5-20 mg (LOTREL) 5-20 mg per capsule Take 1 capsule by mouth once daily. 90 capsule 1    aspirin 81 MG Chew Take 81 mg by mouth once daily. DO NOT TAKE PER MD INSTRUCTIONS ( 1 WEEK PRIOR TO SURGERY)      atorvastatin (LIPITOR) 20 MG tablet Take 1 tablet (20 mg total) by mouth every evening. 90 tablet 1    cyanocobalamin (VITAMIN B-12) 100 MCG tablet Take 100 mcg by mouth once daily. DO NOT TAKE MORNING OF SURGERY      metoprolol succinate (TOPROL-XL) 25 MG 24 hr tablet Take 1 tablet (25 mg total) by mouth once daily. 30 tablet 1    OMEGA-3 ACID ETHYL ESTERS ORAL Take 1 capsule by mouth once daily. DO NOT TAKE PER MD INSTRUCTIONS ( 1 WEEK PRIOR TO SURGERY)      vitamin D (VITAMIN D3) 1000 units Tab Take 1,000 Units by mouth once daily. DO NOT TAKE MORNING OF SURGERY       No current facility-administered medications for this visit.      Facility-Administered Medications Ordered in Other Visits   Medication Dose Route  "Frequency Provider Last Rate Last Dose    gentamicin 80 mg/100ml NACL IVPB IVPB 80 mg  80 mg Intravenous On Call Procedure Delmy Hernandez MD           Review of patient's allergies indicates:  No Known Allergies  Objective:    HPI     Pre-op Exam      Additional comments: prostatectomy-- Dr. Gold          Last edited by Amarilys Richardson MA on 7/28/2020  1:37 PM. (History)      Blood pressure 120/74, pulse 97, temperature 98.9 °F (37.2 °C), height 5' 8" (1.727 m), weight 128.8 kg (284 lb), SpO2 99 %. Body mass index is 43.18 kg/m².   Physical Exam  Vitals signs and nursing note reviewed.   Constitutional:       General: He is not in acute distress.     Appearance: Normal appearance. He is well-developed. He is obese.   HENT:      Head: Normocephalic and atraumatic.      Right Ear: Tympanic membrane, ear canal and external ear normal.      Left Ear: Tympanic membrane, ear canal and external ear normal.      Nose: Nose normal.      Mouth/Throat:      Mouth: Mucous membranes are moist.      Pharynx: Uvula midline.   Eyes:      General: Lids are normal.      Extraocular Movements: Extraocular movements intact.      Conjunctiva/sclera: Conjunctivae normal.      Pupils: Pupils are equal, round, and reactive to light.   Neck:      Musculoskeletal: Normal range of motion and neck supple.   Cardiovascular:      Rate and Rhythm: Normal rate. Rhythm irregularly irregular.      Pulses: Normal pulses.      Heart sounds: Normal heart sounds, S1 normal and S2 normal. No murmur.   Pulmonary:      Effort: Pulmonary effort is normal. No respiratory distress.      Breath sounds: Normal breath sounds.   Abdominal:      General: Bowel sounds are normal.      Palpations: Abdomen is soft.      Tenderness: There is no abdominal tenderness.   Musculoskeletal: Normal range of motion.   Lymphadenopathy:      Cervical: No cervical adenopathy.   Skin:     General: Skin is warm and dry.      Findings: No rash.   Neurological:      " Mental Status: He is alert and oriented to person, place, and time.   Psychiatric:         Mood and Affect: Mood normal.         Speech: Speech normal.         Behavior: Behavior normal.         Thought Content: Thought content normal.         Judgment: Judgment normal.             Assessment:       1. Preoperative clearance    2. Paroxysmal atrial fibrillation    3. Essential (primary) hypertension    4. Prostate cancer    5. Class 3 severe obesity due to excess calories with serious comorbidity and body mass index (BMI) of 40.0 to 44.9 in adult        Plan:       Marcus was seen today for pre-op exam.    Diagnoses and all orders for this visit:    Preoperative clearance  Cleared pending cardiology clearance.      Paroxysmal atrial fibrillation  Patient is currently in atrial fibrillation and is is stable condition.  Cardiology note indicates patient is aware of risks and complications of atrial fibrillation.   Metoprolol started and patient is currently taking.    Essential (primary) hypertension  Controlled.    Prostate cancer  Prostate removal upcoming.    Class 3 severe obesity due to excess calories with serious comorbidity and body mass index (BMI) of 40.0 to 44.9 in adult  The patient is asked to make an attempt to improve diet and exercise patterns to aid in medical management of this problem.         Patient is cleared for surgery with cardiology approval.

## 2020-07-28 NOTE — PATIENT INSTRUCTIONS
About Arrhythmias    Electrical impulses cause the normal heart to beat 60 to 100 times a minute while at rest. These impulses come from a natural pacemaker deep inside the heart muscle. Each impulse causes the heart muscle to contract. This causes the blood to flow through the heart and out to the tissues and organs of your body.  An arrhythmia is a change from the normal speed or pattern of these electrical impulses. This can cause the heart to beat too fast (tachycardia); or too slow (bradycardia); or in an unsteady pattern (irregular rhythm).  Symptoms of arrhythmias  Different people experience arrhythmias differently. Sometimes they may not have symptoms, but just notice a change in their pulse. Symptoms can include:  · Fluttering feeling in the chest  · Shortness of breath  · Chest pain or pressure  · Neck fullness  · Lightheadedness or dizziness  · Fainting or almost fainting  · Palpitations (the sense that your heart is fluttering or beating fast or hard or irregularly)  · Tiredness, fatigue, or weakness  · Cardiac arrest  Causes of arrhythmias  Arrhythmias are most often due to heart disease such as:  · Coronary artery disease  · Heart valve disease  · Enlarged heart  · High blood pressure  · Heart failure  Other causes of  arrhythmia include:  · Certain medicines (such as asthma inhalers and decongestants)  · Some herbal supplements  · Cardiac stimulant drugs (such as cocaine, amphetamine, diet pills, certain decongestant cold medicines, caffeine, and nicotine)  · Excessive alcohol use  · Anxiety and panic disorder  · Thyroid disease  · Anemia  · Diabetes  · Sleep apnea  · Obesity  · Congenital heart disease  · Cardiac genetic diseases  Arrhythmias can often be prevented. The cause and type of arrhythmia determines the best treatment. Sometimes your doctor may want to monitor your heart rate over a 24-hour period or longer. This can help identify the cause of your arrhythmia and find the best treatment.  This can be done with a Holter monitor, a portable EKG recording device attached by wires to your chest. Or you may get an event monitor, which you can place over the skin in front of your heart to record heart rhythms. You can carry this with you as you go about your routine activities during the monitoring period. Implantable loop recorders may also be used to monitor the heart rhythm for up to 2 years. This miniature device is placed underneath the skin overlying the heart.  Home care  The following guidelines will help you care for yourself at home:  · Avoid cardiac stimulants (such as cocaine, amphetamine, diet pills, certain decongestant cold medicines, caffeine, and nicotine).  · If you smoke, stop smoking. Contact your doctor or a local stop-smoking program for help.  · Tell your doctor about any prescription, over-the-counter, or herbal medicines you take. These may be affecting your heart rhythm.  Follow-up care  Follow up with your healthcare provider, or as advised. If a Holter monitor has been recommended, contact the cardiologist you have been referred to as soon as you can  the device. Other outpatient tests may also be arranged for you at that time.  Call 911  This is the fastest and safest way to get to the emergency department. The paramedics can also start treatment on the way to the hospital, if needed.  Don't wait until your symptoms are severe to call 911. Other reasons to call 911 besides chest pain include:  · Chest, shoulder, arm, neck, or back pain  · Shortness of breath  · Feeling lightheaded, faint, or dizzy  · Unexplained fainting  · Rapid heart beat  · Slower than usual heart rate compared to your normal  · Very irregular heartbeat  · Chest pain (angina) with weakness, dizziness, heavy sweating, nausea, or vomiting  · Extreme drowsiness, or confusion  · Weakness of an arm or leg or one side of the face  · Difficulty with speech or vision  When to seek medical advice  Remember,  "things are not always like they are on TV. Sometimes it is not so obvious. You may only feel weak or just "not right." If it is not clear or if you have any doubt, call for advice.  · Seek help for chest pain, or it feels different from usual, even if your symptoms are mild.  · Don't drive yourself. Have someone else drive. If no one can drive you, call 911.  · If your doctor has given you medicines to take when you have symptoms, take them, but do not delay getting help while trying to find them.  Date Last Reviewed: 4/25/2016 © 2000-2017 [employer] [place]. [address] [place] [address], [place] [address]. All rights reserved. This information is not intended as a substitute for professional medical care. Always follow your healthcare professional's instructions.        Understanding Atrial Fibrillation    An arrhythmia is any problem with the speed or pattern of the heartbeat. Atrial fibrillation (AFib) is a common type of arrhythmia. It causes fast, chaotic electrical signals in the atria. This leads to poor functioning of the heart. It also affects how much blood your heart can pump out to the body.  Afib may occur once in a while and go away on its own. Or it may continue for longer periods and need treatment.  AFib can lead to serious problems, such as stroke. Your healthcare provider will need to monitor and manage it.  What happens during atrial fibrillation?   The heart has an electrical system that sends signals to control the heartbeat. As signals move through the heart, they tell the hearts upper chambers (atria) and lower chambers (ventricles) when to squeeze (contract) and relax. This lets blood move through the heart and out to the body and lungs.  With AFib, the atria receive abnormal signals. This causes them to contract in a fast and irregular way, and out of sync with the ventricles. When this happens, the atria also have a harder time moving blood into the ventricles. Blood may then pool in " the atria, which increases the risk for blood clots and stroke. The ventricles also may contract too quickly and irregularly. As a result, they may not pump blood to the body and lungs as well as they should. This can weaken the heart muscle over time and cause heart failure.  What causes atrial fibrillation?  AFib is more common in older adults. It has many possible causes including:  · Coronary artery disease  · Heart valve disease  · Heart attack  · Heart surgery  · High blood pressure  · Thyroid disease  · Diabetes  · Lung disease  · Sleep apnea  · Heavy alcohol use  In some cases of AFib, doctors do not know the cause.  What are the symptoms of atrial fibrillation?  AFib may or may not cause symptoms. If symptoms do occur, they may include:  · A fast, pounding, irregular heartbeat  · Shortness of breath  · Tiredness  · Dizziness or fainting  · Chest pain  How is atrial fibrillation treated?  Treatments for AFib can include any of the options below.  · Medicines. You may be prescribed:  ¨ Heart rate medicines to help slow down the heartbeat  ¨ Heart rhythm medicines to help the heart beat more regularly  ¨ Anti-clotting medicines to help reduce the risk for blood clots and stroke  · Electrical cardioversion. Your healthcare provider uses special pads or paddles to send one or more brief electrical shocks to the heart. This can help reset the heartbeat to normal.  · Ablation. Long, thin tubes called catheters are threaded through a blood vessel to the heart. There, the catheters send out hot or cold energy to the areas causing the abnormal signals. This energy destroys the problem tissue or cells. This improves the chances that your heart will stay in normal rhythm without using medicines. If your heart rate and rhythm cant be controlled, you may need ablation and a pacemaker. These will help control the heart rate and regularity of the heartbeat.  · Surgery. During surgery, your healthcare provider may use  different methods to create scar tissue in the areas of the heart causing the abnormal signals. The scar tissue disrupts the abnormal signals and may stop AFib from occurring.  What are the complications of atrial fibrillation?  These can include:  · Blood clots  · Stroke  · Heart failure. This problem occurs when the heart muscle weakens so much that it can no longer pump blood well.  When should I call my healthcare provider?  Call your healthcare provider right away if you have any of these:  · Symptoms that dont get better with treatment, or get worse  · New symptoms   Date Last Reviewed: 5/1/2016  © 1339-0808 [employer]. [address] [place] [id number]. All rights reserved. This information is not intended as a substitute for professional medical care. Always follow your healthcare professional's instructions.        Presurgery Checklist  You are scheduled to have surgery. The healthcare staff will try to make your stay comfortable. Use the guidelines below to remind yourself what to do before surgery. Be sure to follow any specific pre-op instructions from your surgeon or nurse.  Preparing for surgery  · If you are having abdominal surgery, ask what you need to do to clear your bowel.  · Tell your surgeon if you have allergies to any medicines, latex, or foods.  · Ask your surgeon if you might need a blood transfusion during surgery and if so, how to prepare for it. In some cases, you can donate blood before surgery. If needed, this blood can be given back (transfused) to you during or after surgery.  · Arrange for an adult family member or friend to drive you home after surgery. If possible, have someone ready to help you at home as you recover.  · Call the surgeon if you get a cold, fever, sore throat, diarrhea, or other health problem just before surgery. Your surgeon can decide whether or not to postpone the surgery.  Medicines  · Tell your surgeon about all medicines you take,  including prescription and over-the-counter products such as herbal remedies and vitamins. Ask if you should continue taking them.  · If you take ibuprofen, naproxen, or blood thinners (anticoagulants) such as aspirin, clopidogrel, or warfarin, ask your surgeon whether you should stop taking them and how long before surgery you should stop.  · You may be told to take antibiotics just before surgery to prevent infection. If so, follow instructions carefully on how to take them.  · If you are told to take blood thinners to help prevent blood clots after surgery, be sure to follow the instructions on how to take them.  Stop smoking  If you smoke, healing may take longer. So at least 2 week(s) before surgery, stop smoking.  Bathing or showering before surgery  · If instructed, wash with antibacterial soap. Afterward, do not use lotions, oils, or powders.  · If you are having surgery on the head, you may be asked to shampoo with antibacterial soap. Follow instructions for doing so.  Do not remove hair from the surgery site  Do not shave hair from the incision site, unless you are given specific instructions to do so. Usually, if hair needs to be removed, it will be done at the hospital right before surgery.  Dont eat or drink  · Follow any directions you are given for not eating or drinking before surgery. If you don't follow instructions about when to stop eating and drinking, your procedure may be postponed or rescheduled for another day. This is a safety issue.  · You can brush your teeth and rinse your mouth, but dont swallow any water.  Day of surgery  · Don't wear makeup. Don't use perfume, deodorant, or hairspray. Remove nail polish and artificial nails.  · Leave jewelry (including rings), watches, and other valuables at home.  · Be sure to bring health insurance cards or forms and a photo ID.  · Bring a list of your medicines (include the name, dose, how often you take them, and the time last dose was  taken).  · Arrive on time at the hospital or surgery facility.  Date Last Reviewed: 12/1/2016  © 3514-1273 [employer]. [address] [place] [address], [place] [address]. All rights reserved. This information is not intended as a substitute for professional medical care. Always follow your healthcare professional's instructions.

## 2020-09-29 ENCOUNTER — OFFICE VISIT (OUTPATIENT)
Dept: FAMILY MEDICINE | Facility: CLINIC | Age: 65
End: 2020-09-29
Payer: MEDICARE

## 2020-09-29 VITALS
HEIGHT: 68 IN | DIASTOLIC BLOOD PRESSURE: 60 MMHG | HEART RATE: 85 BPM | SYSTOLIC BLOOD PRESSURE: 110 MMHG | BODY MASS INDEX: 42.59 KG/M2 | OXYGEN SATURATION: 98 % | WEIGHT: 281 LBS | TEMPERATURE: 97 F

## 2020-09-29 DIAGNOSIS — R79.89 ABNORMAL THYROID BLOOD TEST: ICD-10-CM

## 2020-09-29 DIAGNOSIS — I48.0 PAROXYSMAL ATRIAL FIBRILLATION: ICD-10-CM

## 2020-09-29 DIAGNOSIS — E78.5 DYSLIPIDEMIA: ICD-10-CM

## 2020-09-29 DIAGNOSIS — G47.33 OBSTRUCTIVE SLEEP APNEA SYNDROME: ICD-10-CM

## 2020-09-29 DIAGNOSIS — I10 ESSENTIAL (PRIMARY) HYPERTENSION: Primary | ICD-10-CM

## 2020-09-29 DIAGNOSIS — E07.89 OTHER SPECIFIED DISORDERS OF THYROID: ICD-10-CM

## 2020-09-29 DIAGNOSIS — J01.00 ACUTE MAXILLARY SINUSITIS, RECURRENCE NOT SPECIFIED: ICD-10-CM

## 2020-09-29 DIAGNOSIS — Z23 IMMUNIZATION DUE: ICD-10-CM

## 2020-09-29 PROCEDURE — 3008F PR BODY MASS INDEX (BMI) DOCUMENTED: ICD-10-PCS | Mod: S$GLB,,, | Performed by: NURSE PRACTITIONER

## 2020-09-29 PROCEDURE — 99214 OFFICE O/P EST MOD 30 MIN: CPT | Mod: 25,S$GLB,, | Performed by: NURSE PRACTITIONER

## 2020-09-29 PROCEDURE — G0008 FLU VACCINE - QUADRIVALENT - HIGH DOSE (65+) PRESERVATIVE FREE IM: ICD-10-PCS | Mod: S$GLB,,, | Performed by: NURSE PRACTITIONER

## 2020-09-29 PROCEDURE — 3008F BODY MASS INDEX DOCD: CPT | Mod: S$GLB,,, | Performed by: NURSE PRACTITIONER

## 2020-09-29 PROCEDURE — 1101F PT FALLS ASSESS-DOCD LE1/YR: CPT | Mod: S$GLB,,, | Performed by: NURSE PRACTITIONER

## 2020-09-29 PROCEDURE — 90662 IIV NO PRSV INCREASED AG IM: CPT | Mod: S$GLB,,, | Performed by: NURSE PRACTITIONER

## 2020-09-29 PROCEDURE — 3074F PR MOST RECENT SYSTOLIC BLOOD PRESSURE < 130 MM HG: ICD-10-PCS | Mod: S$GLB,,, | Performed by: NURSE PRACTITIONER

## 2020-09-29 PROCEDURE — 3074F SYST BP LT 130 MM HG: CPT | Mod: S$GLB,,, | Performed by: NURSE PRACTITIONER

## 2020-09-29 PROCEDURE — 3078F DIAST BP <80 MM HG: CPT | Mod: S$GLB,,, | Performed by: NURSE PRACTITIONER

## 2020-09-29 PROCEDURE — G0008 ADMIN INFLUENZA VIRUS VAC: HCPCS | Mod: S$GLB,,, | Performed by: NURSE PRACTITIONER

## 2020-09-29 PROCEDURE — 1101F PR PT FALLS ASSESS DOC 0-1 FALLS W/OUT INJ PAST YR: ICD-10-PCS | Mod: S$GLB,,, | Performed by: NURSE PRACTITIONER

## 2020-09-29 PROCEDURE — 90662 FLU VACCINE - QUADRIVALENT - HIGH DOSE (65+) PRESERVATIVE FREE IM: ICD-10-PCS | Mod: S$GLB,,, | Performed by: NURSE PRACTITIONER

## 2020-09-29 PROCEDURE — 3078F PR MOST RECENT DIASTOLIC BLOOD PRESSURE < 80 MM HG: ICD-10-PCS | Mod: S$GLB,,, | Performed by: NURSE PRACTITIONER

## 2020-09-29 PROCEDURE — 99214 PR OFFICE/OUTPT VISIT, EST, LEVL IV, 30-39 MIN: ICD-10-PCS | Mod: 25,S$GLB,, | Performed by: NURSE PRACTITIONER

## 2020-09-29 RX ORDER — DOXYCYCLINE HYCLATE 100 MG
100 TABLET ORAL 2 TIMES DAILY
Qty: 20 TABLET | Refills: 0 | Status: SHIPPED | OUTPATIENT
Start: 2020-09-29 | End: 2021-01-06

## 2020-09-29 RX ORDER — ATORVASTATIN CALCIUM 20 MG/1
20 TABLET, FILM COATED ORAL NIGHTLY
Qty: 90 TABLET | Refills: 1 | Status: SHIPPED | OUTPATIENT
Start: 2020-09-29 | End: 2021-02-17

## 2020-09-29 NOTE — PROGRESS NOTES
Subjective:       Patient ID: Marcus Parisi is a 65 y.o. male.    Chief Complaint: Hypertension and Hyperlipidemia    Patient presents today for six-month follow-up for chronic conditions including hypertension, hyperlipidemia, thyroid problem, and obesity.  Patient also has some recurrence sinus symptoms under shin worsening over the last week that he would like evaluated.  Patient states that he chronically obtain sinus infections in the fall.  Patient does have a history of prostate cancer in which he is being managed by oncology.  Patient reports his last numbers for the prostate were good.  Patient is obese with a BMI of 42.73    Hypertension  This is a chronic problem. The current episode started more than 1 month ago. The problem has been waxing and waning since onset. The problem is controlled. Associated symptoms include headaches. Pertinent negatives include no anxiety, blurred vision, chest pain, neck pain, orthopnea, palpitations, peripheral edema, PND, shortness of breath or sweats. There are no associated agents to hypertension. Risk factors for coronary artery disease include stress, obesity, dyslipidemia and male gender. Past treatments include calcium channel blockers and ACE inhibitors. The current treatment provides significant improvement. Compliance problems include exercise and diet.  There is no history of angina or kidney disease. Identifiable causes of hypertension include a thyroid problem. There is no history of chronic renal disease.   Hyperlipidemia  This is a recurrent problem. The current episode started more than 1 month ago. The problem is controlled. Recent lipid tests were reviewed and are variable. Exacerbating diseases include hypothyroidism and obesity. He has no history of chronic renal disease. Factors aggravating his hyperlipidemia include fatty foods. Pertinent negatives include no chest pain, focal weakness, leg pain, myalgias or shortness of breath. Current  antihyperlipidemic treatment includes statins. The current treatment provides moderate improvement of lipids. Compliance problems include adherence to exercise and adherence to diet.  Risk factors for coronary artery disease include stress, dyslipidemia, male sex, hypertension and obesity.   Thyroid Problem  Presents for follow-up visit. Patient reports no anxiety, cold intolerance, constipation, depressed mood, diaphoresis, fatigue, heat intolerance, hoarse voice, palpitations, weight gain or weight loss. The symptoms have been stable. His past medical history is significant for hyperlipidemia.   Sinus Problem  This is a new problem. The current episode started 1 to 4 weeks ago. The problem has been waxing and waning since onset. There has been no fever. The pain is mild. Associated symptoms include congestion, headaches and sinus pressure. Pertinent negatives include no chills, coughing, diaphoresis, ear pain, hoarse voice, neck pain, shortness of breath, sneezing, sore throat or swollen glands. Past treatments include nothing. The treatment provided no relief.     Review of Systems   Constitutional: Negative for activity change, appetite change, chills, diaphoresis, fatigue, fever, unexpected weight change, weight gain and weight loss.        Obesity   HENT: Positive for congestion, postnasal drip, rhinorrhea, sinus pressure and sinus pain. Negative for ear pain, hoarse voice, sneezing, sore throat, trouble swallowing and voice change.         Ear fullness, decreased hearing   Eyes: Negative for blurred vision, photophobia, pain, discharge and visual disturbance.   Respiratory: Negative for cough, chest tightness, shortness of breath and wheezing.    Cardiovascular: Negative for chest pain, palpitations, orthopnea, leg swelling and PND.        Hypertension, hyperlipidemia   Gastrointestinal: Negative for blood in stool, constipation and nausea.   Endocrine: Negative for cold intolerance, heat intolerance,  polydipsia and polyuria.        History of abnormal thyroid   Genitourinary: Negative for difficulty urinating, dysuria, flank pain, hematuria and urgency.        Prostate cancer   Musculoskeletal: Positive for joint swelling. Negative for arthralgias, gait problem, myalgias and neck pain.   Allergic/Immunologic: Negative for immunocompromised state.   Neurological: Positive for headaches. Negative for dizziness, focal weakness and weakness.   Hematological: Negative for adenopathy.   Psychiatric/Behavioral: Negative for agitation, confusion, dysphoric mood, self-injury and suicidal ideas. The patient is not nervous/anxious.        Past Medical History:   Diagnosis Date    Allergy     Anticoagulant long-term use     Arthritis     Asthma     as a child    Hyperlipidemia     Hypertension     Prostate cancer 2019    Sleep apnea     Use CPAP    Sleep apnea     uses C-PAP      Past Surgical History:   Procedure Laterality Date    carpel tunnel      bilaterally    CYSTOSCOPY N/A 1/28/2019    Procedure: CYSTOSCOPY;  Surgeon: Delmy Hernandez MD;  Location: [facility] OR;  Service: Urology;  Laterality: N/A;    ROBOT-ASSISTED LAPAROSCOPIC PROSTATECTOMY USING DA LAVERN XI N/A 7/30/2020    Procedure: XI ROBOTIC PROSTATECTOMY;  Surgeon: Mejia Gold MD;  Location: [facility] OR;  Service: Urology;  Laterality: N/A;    TRANSRECTAL BIOPSY OF PROSTATE WITH ULTRASOUND GUIDANCE N/A 1/28/2019    Procedure: BIOPSY, PROSTATE, RECTAL APPROACH, WITH US GUIDANCE;  Surgeon: Delmy Hernandez MD;  Location: [facility] OR;  Service: Urology;  Laterality: N/A;       Family History   Problem Relation Age of Onset    Cancer Mother     Asthma Mother     Diabetes Mother     Heart disease Mother     Hypertension Mother     Hypertension Father     Crohn's disease Brother     Cancer Maternal Grandmother        Social History     Socioeconomic History    Marital status:      Spouse name: Not on file    Number of  children: Not on file    Years of education: Not on file    Highest education level: Not on file   Occupational History    Not on file   Social Needs    Financial resource strain: Not hard at all    Food insecurity     Worry: Never true     Inability: Never true    Transportation needs     Medical: No     Non-medical: No   Tobacco Use    Smoking status: Former Smoker     Packs/day: 3.00     Years: 5.00     Pack years: 15.00     Types: Cigarettes     Quit date:      Years since quittin.7    Smokeless tobacco: Current User     Types: Chew    Tobacco comment: quit 40 yrs ago   Substance and Sexual Activity    Alcohol use: Yes     Frequency: Never     Drinks per session: Patient refused     Binge frequency: Never     Comment: occasional    Drug use: No    Sexual activity: Yes     Partners: Female   Lifestyle    Physical activity     Days per week: 0 days     Minutes per session: 0 min    Stress: Not at all   Relationships    Social connections     Talks on phone: Twice a week     Gets together: Never     Attends Jain service: Not on file     Active member of club or organization: Yes     Attends meetings of clubs or organizations: Never     Relationship status:    Other Topics Concern    Not on file   Social History Narrative    Not on file       Current Outpatient Medications   Medication Sig Dispense Refill    amlodipine-benazepril 5-20 mg (LOTREL) 5-20 mg per capsule TAKE 1 CAPSULE DAILY 90 capsule 1    aspirin 81 MG Chew Take 81 mg by mouth once daily. DO NOT TAKE PER MD INSTRUCTIONS ( 1 WEEK PRIOR TO SURGERY)      atorvastatin (LIPITOR) 20 MG tablet Take 1 tablet (20 mg total) by mouth every evening. 90 tablet 1    cyanocobalamin (VITAMIN B-12) 100 MCG tablet Take 100 mcg by mouth once daily. DO NOT TAKE MORNING OF SURGERY      metoprolol succinate (TOPROL-XL) 25 MG 24 hr tablet Take 1 tablet (25 mg total) by mouth once daily. 90 tablet 1    OMEGA-3 ACID ETHYL ESTERS ORAL  "Take 1 capsule by mouth once daily. DO NOT TAKE PER MD INSTRUCTIONS ( 1 WEEK PRIOR TO SURGERY)      vitamin D (VITAMIN D3) 1000 units Tab Take 1,000 Units by mouth once daily. DO NOT TAKE MORNING OF SURGERY      doxycycline (VIBRA-TABS) 100 MG tablet Take 1 tablet (100 mg total) by mouth 2 (two) times daily. 20 tablet 0     No current facility-administered medications for this visit.        Review of patient's allergies indicates:  No Known Allergies  Objective:      Blood pressure 110/60, pulse 85, temperature 97.2 °F (36.2 °C), height 5' 8" (1.727 m), weight 127.5 kg (281 lb), SpO2 98 %. Body mass index is 42.73 kg/m².   Physical Exam  Vitals signs and nursing note reviewed.   Constitutional:       Appearance: He is well-developed.      Comments: Obesity   HENT:      Head: Normocephalic and atraumatic.      Right Ear: External ear normal. A middle ear effusion is present. A foreign body (wax) is present.      Left Ear: External ear normal. A middle ear effusion is present. A foreign body (wax) is present.      Nose: Nose normal.      Right Turbinates: Swollen.      Left Turbinates: Swollen.      Mouth/Throat:      Lips: Pink.      Pharynx: Uvula midline. Oropharyngeal exudate present.   Eyes:      General: Lids are normal.      Conjunctiva/sclera: Conjunctivae normal.      Pupils: Pupils are equal, round, and reactive to light.   Neck:      Musculoskeletal: Normal range of motion and neck supple.   Cardiovascular:      Rate and Rhythm: Normal rate and regular rhythm.      Pulses: Normal pulses.      Heart sounds: Normal heart sounds, S1 normal and S2 normal. No murmur.   Pulmonary:      Effort: Pulmonary effort is normal. No respiratory distress.      Breath sounds: Normal breath sounds and air entry. No stridor. No decreased breath sounds or wheezing.   Abdominal:      General: Bowel sounds are normal.      Palpations: Abdomen is soft.      Tenderness: There is no abdominal tenderness.   Musculoskeletal: Normal " range of motion.   Lymphadenopathy:      Cervical: No cervical adenopathy.   Skin:     General: Skin is warm and dry.      Findings: No rash.   Neurological:      Mental Status: He is alert and oriented to person, place, and time.   Psychiatric:         Speech: Speech normal.         Behavior: Behavior normal.         Thought Content: Thought content normal.         Judgment: Judgment normal.             Assessment:       1. Essential (primary) hypertension    2. Dyslipidemia    3. Abnormal thyroid blood test    4. Obstructive sleep apnea syndrome    5. Paroxysmal atrial fibrillation    6. Other specified disorders of thyroid     7. Acute maxillary sinusitis, recurrence not specified    8. Immunization due        Plan:       Marcus was seen today for hypertension and hyperlipidemia.    Diagnoses and all orders for this visit:    Essential (primary) hypertension  -     CBC auto differential; Future  -     Comprehensive metabolic panel; Future  -     Urinalysis; Future  -     CBC auto differential  -     Comprehensive metabolic panel  -     Urinalysis    Dyslipidemia  -     Lipid Panel; Future  -     Lipid Panel  -     atorvastatin (LIPITOR) 20 MG tablet; Take 1 tablet (20 mg total) by mouth every evening.    Abnormal thyroid blood test  -     TSH; Future  -     T3, free; Future  -     T4, free; Future  -     TSH  -     T3, free  -     T4, free    Obstructive sleep apnea syndrome  -     Comprehensive metabolic panel; Future  -     Comprehensive metabolic panel    Paroxysmal atrial fibrillation  Stable controlled.  Patient is on metoprolol.    Other specified disorders of thyroid   -     TSH; Future  -     T4, free; Future  -     TSH  -     T4, free    Acute maxillary sinusitis, recurrence not specified  -     doxycycline (VIBRA-TABS) 100 MG tablet; Take 1 tablet (100 mg total) by mouth 2 (two) times daily.    Immunization due  -     Influenza - Quadrivalent - High Dose (65+) (PF) (IM)  Flu vaccine administered    Labs  to be drawn within the next week.  Will notify of lab results when received.  Continue current medication regimen.  Follow-up in 3 months for annual physical

## 2020-09-29 NOTE — PATIENT INSTRUCTIONS
Taking Your Blood Pressure  Blood pressure is the force of blood against the artery wall as it moves from the heart through the blood vessels. You can take your own blood pressure reading using a digital monitor. Take your readings the same each time, using the same arm. Take readings as often as your healthcare provider instructs.  About blood pressure monitors  Blood pressure monitors are designed for certain ages and cases. You can find monitors for older adults, for pregnant women, and for children. Make sure the one you choose is the right one for your age and situation.  The American Heart Association recommends an automatic cuff monitor that fits on your upper arm (bicep). The cuff should fit your arm size. A cuff thats too large or too small will not give an accurate reading. Measure around your upper arm to find your size.  Monitors that attach to your finger or wrist are not as accurate as monitors for your upper arm.  Ask your healthcare provider for help in choosing a monitor. Bring your monitor to your next provider visit if you need help in using it the correct way.  The steps below are general instructions for using an automatic digital monitor.  Step 1. Relax    · Take your blood pressure at the same time every day, such as in the morning or evening, or at the time your healthcare provider recommends.  · Wait at least a half-hour after smoking, eating, or exercising. Don't drink coffee, tea, soda, or other caffeinated beverages before checking your blood pressure.  · Sit comfortably at a table with both feet on the floor. Do not cross your legs or feet. Place the monitor near you.  · Rest for a few minutes before you begin.  Step 2. Wrap the cuff    · Place your arm on the table, palm up. Your arm should be at the level of your heart. Wrap the cuff around your upper arm, just above your elbow. Its best done on bare skin, not over clothing. Most cuffs will indicate where the brachial artery (the  blood vessel in the middle of the arm at the inner side of the elbow) should line up with the cuff. Look in your monitor's instruction booklet for an illustration. You can also bring your cuff to your healthcare provider and have them show you how to correctly place the cuff.  Step 3. Inflate the cuff    · Push the button that starts the pump.  · The cuff will tighten, then loosen.  · The numbers will change. When they stop changing, your blood pressure reading will appear.  · Take 2 or 3 readings one minute apart.  Step 4. Write down the results of each reading    · Write down your blood pressure numbers for each reading. Note the date and time. Keep your results in one place, such as a notebook. Even if your monitor has a built-in memory, keep a hard copy of the readings.  · Remove the cuff from your arm. Turn off the machine.  · Bring your blood pressure records with your healthcare providers at each visit.  · If you start a new blood pressure medicine, note the day you started the new medicine. Also note the day if you change the dose of your medicine. This information goes on your blood pressure recording sheet. This will help your healthcare provider monitor how well the medicine changes are working.  · Ask your healthcare provider what numbers should prompt you to call him or her. Also ask what numbers should prompt you to get help right away.  Date Last Reviewed: 11/1/2016 © 2000-2017 [employer]. [address] [place] [address]. All rights reserved. This information is not intended as a substitute for professional medical care. Always follow your healthcare professional's instructions.        Eating Heart-Healthy Foods  Eating has a big impact on your heart health. In fact, eating healthier can improve several of your heart risks at once. For instance, it helps you manage weight, cholesterol, and blood pressure. Here are ideas to help you make heart-healthy changes without giving up  all the foods and flavors you love.  Getting started  · Talk with your health care provider about eating plans, such as the DASH or Mediterranean diet. You may also be referred to a dietitian.  · Change a few things at a time. Give yourself time to get used to a few eating changes before adding more.  · Work to create a tasty, healthy eating plan that you can stick to for the rest of your life.    Goals for healthy eating  Below are some tips to improve your eating habits:  · Limit saturated fats and trans fats. Saturated fats raise your levels of cholesterol, so keep these fats to a minimum. They are found in foods such as fatty meats, whole milk, cheese, and palm and coconut oils. Avoid trans fats because they lower good cholesterol as well as raise bad cholesterol. Trans fats are most often found in processed foods.  · Reduce sodium (salt) intake. Eating too much salt may increase your blood pressure. Limit your sodium intake to 2,300 milligrams (mg) per day, or less if your health care provider recommends it. Dining out less often and eating fewer processed foods are two great ways to decrease the amount of salt you consume.  · Managing calories. A calorie is a unit of energy. Your body burns calories for fuel, but if you eat more calories than your body burns, the extras are stored as fat. Your health care provider can help you create a diet plan to manage your calories. This will likely include eating healthier foods as well as exercising regularly. To help you track your progress, keep a diary to record what you eat and how often you exercise.  Choose the right foods  Aim to make these foods staples of your diet. If you have diabetes, you may have different recommendations than what is listed here:  · Fruits and vegetable provide plenty of nutrients without a lot of calories. At meals, fill half your plate with these foods. Split the other half of your plate between whole grains and lean protein.  · Whole  grains are high in fiber and rich in vitamins and nutrients. Good choices include whole-wheat bread, pasta, and brown rice.  · Lean proteins give you nutrition with less fat. Good choices include fish, skinless chicken, and beans.  · Low-fat or nonfat dairy provides nutrients without a lot of fat. Try low-fat or nonfat milk, cheese, or yogurt.  · Healthy fats can be good for you in small amounts. These are unsaturated fats, such as olive oil, nuts, and fish. Try to have at least 2 servings per week of fatty fish such as salmon, sardines, mackerel, rainbow trout, and albacore tuna. These contain omega-3 fatty acids, which are good for your heart. Flaxseed is another source of a heart-healthy fat.  More on heart healthy eating    Read food labels  Healthy eating starts at the grocery store. Be sure to pay attention to food labels on packaged foods. Look for products that are high in fiber and protein, and low in saturated fat, cholesterol, and sodium. Avoid products that contain trans fat. And pay close attention to serving size. For instance, if you plan to eat two servings, double all the numbers on the label.  Prepare food right  A key part of healthy cooking is cutting down on added fat and salt. Look on the internet for lower-fat, lower-sodium recipes. Also, try these tips:  · Remove fat from meat and skin from poultry before cooking.  · Skim fat from the surface of soups and sauces.  · Broil, boil, bake, steam, grill, and microwave food without added fats.  · Choose ingredients that spice up your food without adding calories, fat, or sodium. Try these items: horseradish, hot sauce, lemon, mustard, nonfat salad dressings, and vinegar. For salt-free herbs and spices, try basil, cilantro, cinnamon, pepper, and rosemary.  Date Last Reviewed: 6/25/2015  © 4456-6842 [employer]. [address] [place] [address], [place] [address]. All rights reserved. This information is not intended as a substitute for  professional medical care. Always follow your healthcare professional's instructions.

## 2020-10-04 ENCOUNTER — PATIENT MESSAGE (OUTPATIENT)
Dept: FAMILY MEDICINE | Facility: CLINIC | Age: 65
End: 2020-10-04

## 2020-10-06 ENCOUNTER — PATIENT MESSAGE (OUTPATIENT)
Dept: FAMILY MEDICINE | Facility: CLINIC | Age: 65
End: 2020-10-06

## 2020-12-08 LAB
ALBUMIN SERPL-MCNC: 4.1 G/DL (ref 3.6–5.1)
ALBUMIN/GLOB SERPL: 2 (CALC) (ref 1–2.5)
ALP SERPL-CCNC: 69 U/L (ref 35–144)
ALT SERPL-CCNC: 36 U/L (ref 9–46)
APPEARANCE UR: ABNORMAL
AST SERPL-CCNC: 22 U/L (ref 10–35)
BASOPHILS # BLD AUTO: 22 CELLS/UL (ref 0–200)
BASOPHILS NFR BLD AUTO: 0.5 %
BILIRUB SERPL-MCNC: 2 MG/DL (ref 0.2–1.2)
BILIRUB UR QL STRIP: NEGATIVE
BUN SERPL-MCNC: 11 MG/DL (ref 7–25)
BUN/CREAT SERPL: ABNORMAL (CALC) (ref 6–22)
CALCIUM SERPL-MCNC: 9.2 MG/DL (ref 8.6–10.3)
CHLORIDE SERPL-SCNC: 105 MMOL/L (ref 98–110)
CHOLEST SERPL-MCNC: 119 MG/DL
CHOLEST/HDLC SERPL: 3.3 (CALC)
CO2 SERPL-SCNC: 27 MMOL/L (ref 20–32)
COLOR UR: YELLOW
CREAT SERPL-MCNC: 0.89 MG/DL (ref 0.7–1.25)
EOSINOPHIL # BLD AUTO: 120 CELLS/UL (ref 15–500)
EOSINOPHIL NFR BLD AUTO: 2.8 %
ERYTHROCYTE [DISTWIDTH] IN BLOOD BY AUTOMATED COUNT: 15.4 % (ref 11–15)
GFRSERPLBLD MDRD-ARVRAT: 90 ML/MIN/1.73M2
GLOBULIN SER CALC-MCNC: 2.1 G/DL (CALC) (ref 1.9–3.7)
GLUCOSE SERPL-MCNC: 116 MG/DL (ref 65–99)
GLUCOSE UR QL STRIP: NEGATIVE
HCT VFR BLD AUTO: 41.5 % (ref 38.5–50)
HDLC SERPL-MCNC: 36 MG/DL
HGB BLD-MCNC: 13.3 G/DL (ref 13.2–17.1)
HGB UR QL STRIP: NEGATIVE
KETONES UR QL STRIP: NEGATIVE
LDLC SERPL CALC-MCNC: 66 MG/DL (CALC)
LEUKOCYTE ESTERASE UR QL STRIP: ABNORMAL
LYMPHOCYTES # BLD AUTO: 800 CELLS/UL (ref 850–3900)
LYMPHOCYTES NFR BLD AUTO: 18.6 %
MCH RBC QN AUTO: 28.5 PG (ref 27–33)
MCHC RBC AUTO-ENTMCNC: 32 G/DL (ref 32–36)
MCV RBC AUTO: 89.1 FL (ref 80–100)
MONOCYTES # BLD AUTO: 387 CELLS/UL (ref 200–950)
MONOCYTES NFR BLD AUTO: 9 %
NEUTROPHILS # BLD AUTO: 2971 CELLS/UL (ref 1500–7800)
NEUTROPHILS NFR BLD AUTO: 69.1 %
NITRITE UR QL STRIP: NEGATIVE
NONHDLC SERPL-MCNC: 83 MG/DL (CALC)
PH UR STRIP: ABNORMAL [PH] (ref 5–8)
PLATELET # BLD AUTO: 173 THOUSAND/UL (ref 140–400)
PMV BLD REES-ECKER: 10.4 FL (ref 7.5–12.5)
POTASSIUM SERPL-SCNC: 4 MMOL/L (ref 3.5–5.3)
PROT SERPL-MCNC: 6.2 G/DL (ref 6.1–8.1)
PROT UR QL STRIP: NEGATIVE
RBC # BLD AUTO: 4.66 MILLION/UL (ref 4.2–5.8)
SODIUM SERPL-SCNC: 139 MMOL/L (ref 135–146)
SP GR UR STRIP: 1.02 (ref 1–1.03)
T3FREE SERPL-MCNC: 3 PG/ML (ref 2.3–4.2)
T4 FREE SERPL-MCNC: 0.9 NG/DL (ref 0.8–1.8)
TRIGL SERPL-MCNC: 83 MG/DL
TSH SERPL-ACNC: 4.15 MIU/L (ref 0.4–4.5)
WBC # BLD AUTO: 4.3 THOUSAND/UL (ref 3.8–10.8)

## 2020-12-29 ENCOUNTER — OFFICE VISIT (OUTPATIENT)
Dept: FAMILY MEDICINE | Facility: CLINIC | Age: 65
End: 2020-12-29
Payer: MEDICARE

## 2020-12-29 VITALS
TEMPERATURE: 98 F | DIASTOLIC BLOOD PRESSURE: 60 MMHG | HEART RATE: 93 BPM | SYSTOLIC BLOOD PRESSURE: 100 MMHG | WEIGHT: 293 LBS | OXYGEN SATURATION: 97 % | HEIGHT: 68 IN | BODY MASS INDEX: 44.41 KG/M2

## 2020-12-29 DIAGNOSIS — Z23 NEED FOR TDAP VACCINATION: ICD-10-CM

## 2020-12-29 DIAGNOSIS — Z12.11 COLON CANCER SCREENING: ICD-10-CM

## 2020-12-29 DIAGNOSIS — Z11.59 NEED FOR HEPATITIS C SCREENING TEST: ICD-10-CM

## 2020-12-29 DIAGNOSIS — Z00.00 ANNUAL PHYSICAL EXAM: Primary | ICD-10-CM

## 2020-12-29 DIAGNOSIS — M62.9 DISORDER OF MUSCLE, UNSPECIFIED: ICD-10-CM

## 2020-12-29 DIAGNOSIS — Z23 NEED FOR PNEUMOCOCCAL VACCINE: ICD-10-CM

## 2020-12-29 DIAGNOSIS — R73.01 IMPAIRED FASTING GLUCOSE: ICD-10-CM

## 2020-12-29 DIAGNOSIS — R79.89 ABNORMAL THYROID BLOOD TEST: ICD-10-CM

## 2020-12-29 DIAGNOSIS — E55.9 VITAMIN D INSUFFICIENCY: ICD-10-CM

## 2020-12-29 DIAGNOSIS — I10 ESSENTIAL (PRIMARY) HYPERTENSION: Primary | ICD-10-CM

## 2020-12-29 DIAGNOSIS — I48.0 PAROXYSMAL ATRIAL FIBRILLATION: ICD-10-CM

## 2020-12-29 DIAGNOSIS — E78.5 DYSLIPIDEMIA: ICD-10-CM

## 2020-12-29 DIAGNOSIS — Z23 NEED FOR SHINGLES VACCINE: ICD-10-CM

## 2020-12-29 DIAGNOSIS — Z11.4 ENCOUNTER FOR SCREENING FOR HIV: ICD-10-CM

## 2020-12-29 DIAGNOSIS — E66.9 OBESITY, UNSPECIFIED CLASSIFICATION, UNSPECIFIED OBESITY TYPE, UNSPECIFIED WHETHER SERIOUS COMORBIDITY PRESENT: ICD-10-CM

## 2020-12-29 PROCEDURE — 3008F PR BODY MASS INDEX (BMI) DOCUMENTED: ICD-10-PCS | Mod: S$GLB,,, | Performed by: NURSE PRACTITIONER

## 2020-12-29 PROCEDURE — 3074F PR MOST RECENT SYSTOLIC BLOOD PRESSURE < 130 MM HG: ICD-10-PCS | Mod: S$GLB,,, | Performed by: NURSE PRACTITIONER

## 2020-12-29 PROCEDURE — 3078F PR MOST RECENT DIASTOLIC BLOOD PRESSURE < 80 MM HG: ICD-10-PCS | Mod: S$GLB,,, | Performed by: NURSE PRACTITIONER

## 2020-12-29 PROCEDURE — 1126F PR PAIN SEVERITY QUANTIFIED, NO PAIN PRESENT: ICD-10-PCS | Mod: S$GLB,,, | Performed by: NURSE PRACTITIONER

## 2020-12-29 PROCEDURE — 3074F SYST BP LT 130 MM HG: CPT | Mod: S$GLB,,, | Performed by: NURSE PRACTITIONER

## 2020-12-29 PROCEDURE — G0009 ADMIN PNEUMOCOCCAL VACCINE: HCPCS | Mod: S$GLB,,, | Performed by: NURSE PRACTITIONER

## 2020-12-29 PROCEDURE — G0009 PNEUMOCOCCAL CONJUGATE VACCINE 13-VALENT LESS THAN 5YO & GREATER THAN: ICD-10-PCS | Mod: S$GLB,,, | Performed by: NURSE PRACTITIONER

## 2020-12-29 PROCEDURE — 99397 PR PREVENTIVE VISIT,EST,65 & OVER: ICD-10-PCS | Mod: 25,S$GLB,, | Performed by: NURSE PRACTITIONER

## 2020-12-29 PROCEDURE — 99397 PER PM REEVAL EST PAT 65+ YR: CPT | Mod: 25,S$GLB,, | Performed by: NURSE PRACTITIONER

## 2020-12-29 PROCEDURE — 3078F DIAST BP <80 MM HG: CPT | Mod: S$GLB,,, | Performed by: NURSE PRACTITIONER

## 2020-12-29 PROCEDURE — 3008F BODY MASS INDEX DOCD: CPT | Mod: S$GLB,,, | Performed by: NURSE PRACTITIONER

## 2020-12-29 PROCEDURE — 1126F AMNT PAIN NOTED NONE PRSNT: CPT | Mod: S$GLB,,, | Performed by: NURSE PRACTITIONER

## 2020-12-29 PROCEDURE — 90670 PNEUMOCOCCAL CONJUGATE VACCINE 13-VALENT LESS THAN 5YO & GREATER THAN: ICD-10-PCS | Mod: S$GLB,,, | Performed by: NURSE PRACTITIONER

## 2020-12-29 PROCEDURE — 90670 PCV13 VACCINE IM: CPT | Mod: S$GLB,,, | Performed by: NURSE PRACTITIONER

## 2020-12-29 RX ORDER — TETANUS TOXOID, REDUCED DIPHTHERIA TOXOID AND ACELLULAR PERTUSSIS VACCINE, ADSORBED 5; 2.5; 8; 8; 2.5 [IU]/.5ML; [IU]/.5ML; UG/.5ML; UG/.5ML; UG/.5ML
0.5 SUSPENSION INTRAMUSCULAR ONCE
Qty: 0.5 ML | Refills: 0 | Status: SHIPPED | OUTPATIENT
Start: 2020-12-29 | End: 2020-12-29

## 2020-12-29 RX ORDER — ZOSTER VACCINE RECOMBINANT, ADJUVANTED 50 MCG/0.5
0.5 KIT INTRAMUSCULAR ONCE
Qty: 1 EACH | Refills: 0 | Status: SHIPPED | OUTPATIENT
Start: 2020-12-29 | End: 2020-12-29

## 2020-12-29 RX ORDER — TADALAFIL 20 MG/1
20 TABLET ORAL DAILY
COMMUNITY
End: 2021-06-28 | Stop reason: SDUPTHER

## 2020-12-29 NOTE — PROGRESS NOTES
HPI: Marcus Parisi  is a 65 y.o. male who presents for annual physical .  No complaints at this time.     Review of Systems   Constitutional: Negative for activity change, appetite change, chills, diaphoresis, fatigue, fever and unexpected weight change.        Obesity   HENT: Negative for congestion, ear discharge, ear pain, hearing loss, rhinorrhea, sore throat, trouble swallowing and voice change.    Eyes: Negative for photophobia, pain, discharge and visual disturbance.   Respiratory: Negative for cough, chest tightness, shortness of breath and wheezing.    Cardiovascular: Negative for chest pain, palpitations and leg swelling.   Gastrointestinal: Negative for abdominal pain, blood in stool, constipation, diarrhea, nausea and vomiting.   Endocrine: Negative for cold intolerance, heat intolerance, polydipsia and polyuria.   Genitourinary: Negative for difficulty urinating, dysuria, flank pain, hematuria and urgency.   Musculoskeletal: Negative for arthralgias, gait problem, joint swelling, myalgias and neck pain.   Skin: Negative for rash.   Allergic/Immunologic: Negative for immunocompromised state.   Neurological: Negative for dizziness, weakness and headaches.   Hematological: Negative for adenopathy.   Psychiatric/Behavioral: Negative for agitation, confusion, dysphoric mood, self-injury and suicidal ideas.     Review of patient's allergies indicates:  No Known Allergies  Past Medical History:   Diagnosis Date    Allergy     Anticoagulant long-term use     Arthritis     Asthma     as a child    Hyperlipidemia     Hypertension     Prostate cancer 2019    Sleep apnea     Use CPAP    Sleep apnea     uses C-PAP     Past Surgical History:   Procedure Laterality Date    carpel tunnel      bilaterally    CYSTOSCOPY N/A 1/28/2019    Procedure: CYSTOSCOPY;  Surgeon: Delmy Hernandez MD;  Location: [facility] OR;  Service: Urology;  Laterality: N/A;    ROBOT-ASSISTED LAPAROSCOPIC PROSTATECTOMY USING DA  LAVERN XI N/A 2020    Procedure: XI ROBOTIC PROSTATECTOMY;  Surgeon: Mejai Gold MD;  Location: [facility] OR;  Service: Urology;  Laterality: N/A;    TRANSRECTAL BIOPSY OF PROSTATE WITH ULTRASOUND GUIDANCE N/A 2019    Procedure: BIOPSY, PROSTATE, RECTAL APPROACH, WITH US GUIDANCE;  Surgeon: Delmy Hernandez MD;  Location: [facility] OR;  Service: Urology;  Laterality: N/A;     Family History   Problem Relation Age of Onset    Cancer Mother     Asthma Mother     Diabetes Mother     Heart disease Mother     Hypertension Mother     Hypertension Father     Crohn's disease Brother     Cancer Maternal Grandmother      Social History     Tobacco Use    Smoking status: Former Smoker     Packs/day: 3.00     Years: 5.00     Pack years: 15.00     Types: Cigarettes     Quit date:      Years since quittin.0    Smokeless tobacco: Current User     Types: Chew    Tobacco comment: quit 40 yrs ago   Substance Use Topics    Alcohol use: Yes     Frequency: Never     Drinks per session: Patient refused     Binge frequency: Never     Comment: occasional    Drug use: No      Health Maintenance Topics with due status: Not Due       Topic Last Completion Date    Lipid Panel 2020     Immunization History   Administered Date(s) Administered    Influenza - Quadrivalent - High Dose - PF (65 years and older) 2020    Pneumococcal Conjugate - 13 Valent 2020     OBJECTIVE:      Vitals:    20 0803   BP: 100/60   Pulse: 93   Temp: 98.4 °F (36.9 °C)     Physical Exam  Vitals signs and nursing note reviewed.   Constitutional:       General: He is not in acute distress.     Appearance: Normal appearance. He is well-developed. He is obese. He is not ill-appearing.   HENT:      Head: Normocephalic and atraumatic.      Right Ear: Ear canal and external ear normal.      Left Ear: Ear canal and external ear normal.      Nose: Nose normal.      Mouth/Throat:      Mouth: Mucous membranes are moist.       Pharynx: Uvula midline.   Eyes:      General: Lids are normal.      Extraocular Movements: Extraocular movements intact.      Conjunctiva/sclera: Conjunctivae normal.      Pupils: Pupils are equal, round, and reactive to light.   Neck:      Musculoskeletal: Normal range of motion and neck supple.   Cardiovascular:      Rate and Rhythm: Normal rate and regular rhythm.      Pulses: Normal pulses.      Heart sounds: Normal heart sounds, S1 normal and S2 normal. No murmur.   Pulmonary:      Effort: Pulmonary effort is normal. No respiratory distress.      Breath sounds: Normal breath sounds.   Abdominal:      General: Bowel sounds are normal.      Palpations: Abdomen is soft.      Tenderness: There is no abdominal tenderness.   Musculoskeletal: Normal range of motion.   Lymphadenopathy:      Cervical: No cervical adenopathy.   Skin:     General: Skin is warm and dry.      Capillary Refill: Capillary refill takes less than 2 seconds.      Findings: No rash.   Neurological:      General: No focal deficit present.      Mental Status: He is alert and oriented to person, place, and time. Mental status is at baseline.   Psychiatric:         Mood and Affect: Mood normal.         Speech: Speech normal.         Behavior: Behavior normal.         Thought Content: Thought content normal.         Judgment: Judgment normal.        Assessment:       1. Annual physical exam    2. Need for hepatitis C screening test    3. Encounter for screening for HIV    4. Need for Tdap vaccination    5. Need for shingles vaccine    6. Colon cancer screening    7. Need for pneumococcal vaccine        Plan:       Marcus was seen today for annual exam.    Diagnoses and all orders for this visit:    Annual physical exam  Completed.    Need for hepatitis C screening test  -     Hepatitis C Antibody; Future  -     Hepatitis C Antibody    Encounter for screening for HIV  -     HIV 1/2 Ag/Ab (4th Gen); Future  -     HIV 1/2 Ag/Ab (4th Gen)    Need for  Tdap vaccination  -     diphth,pertus,acell,,tetanus (BOOSTRIX TDAP) 2.5-8-5 Lf-mcg-Lf/0.5mL Susp; Inject 0.5 mLs into the muscle once. for 1 dose    Need for shingles vaccine  -     varicella-zoster [id number], PF, (SHINGRIX, PF,) 50 mcg/0.5 mL injection; Inject 0.5 mLs into the muscle once. for 1 dose    Colon cancer screening  Referred to GI, Dr. Gross.    Need for pneumococcal vaccine  -     (In Office Administered) Pneumococcal Conjugate Vaccine (13 Valent) (IM)  Completed.        Patient counseled on age appropriate medical preventative services, age appropriate cancer screenings, nutrition, healthy diet, consistent exercise regimen and maintaining an active lifestyle.      Counseled on age appropriate vaccines and discussed upcoming health care needs based on age/gender.  Spent time with patient counseling on need for a good patient/doctor relationship moving forward.  Discussed use of common OTC medications and supplements.  Discussed common dietary aids and use of caffeine and the need for good sleep hygiene and stress management.    Follow up in about 6 months (around 6/29/2021) for thyroid, cholesterol, htn.      12/29/2020 IMELDA De Leon, FNP-C

## 2020-12-29 NOTE — PATIENT INSTRUCTIONS
Prevention Guidelines, Men Ages 65 and Older  Screening tests and vaccines are an important part of managing your health. Health counseling is essential, too. Below are guidelines for these, for men ages 65 and older. Talk with your healthcare provider to make sure youre up-to-date on what you need.  Screening Who needs it How often   Abdominal aortic aneurysm Men ages 65 to 75 who have ever smoked 1 ultrasound   Alcohol misuse All men in this age group At routine exams   Blood pressure All men in this age group Every 2 years if your blood pressure is less than 120/80 mm Hg; yearly if your systolic blood pressure is 120 to 139 mm Hg, or your diastolic blood pressure reading is 80 to 89 mm Hg   Colorectal cancer All men in this age group Flexible sigmoidoscopy every 5 years, or colonoscopy every 10 years, or double-contrast barium enema every 5 years; yearly fecal occult blood test or fecal immunochemical test; or a stool DNA test as often as your healthcare provider advises; talk with your healthcare provider about which tests are best for you and when you no longer need colonoscopies (generally after age 75)   Depression All men in this age group At routine exams   Type 2 diabetes or prediabetes All adults beginning at age 45 and adults without symptoms at any age who are overweight or obese and have 1 or more other risk factors for diabetes At least every 3 years (yearly if your blood sugar has already begun to rise)   Hepatitis C Men at increased risk for infection - talk with your healthcare provider At routine exams   High cholesterol or triglycerides All men in this age group At least every 5 years   HIV Men at increased risk for infection - talk with your healthcare provider At routine exams   Lung cancer Adults ages 55 to 80 who have smoked Yearly screening in smokers with 30 pack-year history of smoking or who quit within 15 years   Obesity All men in this age group At routine exams   Prostate cancer All  men in this age group, talk to healthcare provider about risks and benefits of digital rectal exam (JAHAIRA) and prostate-specific antigen (PSA) screening1 At routine exams   Syphilis Men at increased risk for infection - talk with your healthcare provider At routine exams   Tuberculosis Men at increased risk for infection - talk with your healthcare provider Ask your healthcare provider   Vision All men in this age group Every 1 to 2 years; if you have a chronic health condition, ask your healthcare provider if you needs exams more often   Vaccine Who needs it How often   Chickenpox (varicella) All men in this age group who have no record of this infection or vaccine 2 doses; second dose should be given at least 4 weeks after the first dose   Hepatitis A Men at increased risk for infection - talk with your healthcare provider 2 doses given at least 6 months apart   Hepatitis B Men at increased risk for infection - talk with your healthcare provider 3 doses over 6 months; second dose should be given 1 month after the first dose; the third dose should be given at least 2 months after the second dose and at least 4 months after the first dose   Haemophilus influenzae Type B (HIB) Men at increased risk for infection - talk with your healthcare provider 1 to 3 doses   Influenza (flu) All men in this age group  Once a year   Meningococcal Men at increased risk for infection - talk with your healthcare provider 1 or more doses   Pneumococcal conjugate vaccine (PCV13) and pneumococcal polysaccharide vaccine (PPSV23) All men in this age group 1 dose of each vaccine   Tetanus/diphtheria/  pertussis (Td/Tdap) booster All men in this age group Td every 10 years, or Tdap if you will have contact with a child younger than 12 months old   Zoster All men in this age group 1 dose   Counseling Who needs it How often   Diet and exercise Men who are overweight or obese When diagnosed, and then at routine exams   Fall prevention (exercise,  vitamin D supplements) All men in this age group At routine exams   Sexually transmitted infection Men at increased risk for infection - talk with your healthcare provider At routine exams   Use of daily aspirin Men ages 45 to 79 at risk for cardiovascular health problems At routine exams   Use of tobacco and the health effects it can cause All men in this age group Every visit   [address] [place] [address] [facility]   Date Last Reviewed: 2/1/2017  © 7564-7055 [employer]. [address] [place] [address], [place] [address]. All rights reserved. This information is not intended as a substitute for professional medical care. Always follow your healthcare professional's instructions.

## 2020-12-30 ENCOUNTER — TELEPHONE (OUTPATIENT)
Dept: FAMILY MEDICINE | Facility: CLINIC | Age: 65
End: 2020-12-30

## 2020-12-30 NOTE — TELEPHONE ENCOUNTER
Discussed with patient yesterday that he will need chest x-ray, EKG and repeat labs if outside of 30 days when last labs were completed.  Office visit needed if clearance is needed from me.  Patient verbalized understanding yesterday.

## 2020-12-30 NOTE — TELEPHONE ENCOUNTER
Liz voicemail from June at [facility] with Dr Ro. Pt is needing clearance for total knee arthroplasty. I returned call and left msg with June, asking when surgery is scheduled and if any pre-op labs are planned.

## 2021-01-04 ENCOUNTER — TELEPHONE (OUTPATIENT)
Dept: FAMILY MEDICINE | Facility: CLINIC | Age: 66
End: 2021-01-04

## 2021-01-06 PROBLEM — I48.91 ATRIAL FIBRILLATION: Status: ACTIVE | Noted: 2021-01-06

## 2021-01-08 ENCOUNTER — APPOINTMENT (OUTPATIENT)
Dept: URGENT CARE | Facility: CLINIC | Age: 66
End: 2021-01-08
Payer: MEDICARE

## 2021-01-08 DIAGNOSIS — Z01.818 PRE-OP TESTING: ICD-10-CM

## 2021-01-08 PROCEDURE — U0003 INFECTIOUS AGENT DETECTION BY NUCLEIC ACID (DNA OR RNA); SEVERE ACUTE RESPIRATORY SYNDROME CORONAVIRUS 2 (SARS-COV-2) (CORONAVIRUS DISEASE [COVID-19]), AMPLIFIED PROBE TECHNIQUE, MAKING USE OF HIGH THROUGHPUT TECHNOLOGIES AS DESCRIBED BY CMS-2020-01-R: HCPCS

## 2021-01-09 LAB — SARS-COV-2 RNA RESP QL NAA+PROBE: NOT DETECTED

## 2021-01-28 ENCOUNTER — TELEPHONE (OUTPATIENT)
Dept: FAMILY MEDICINE | Facility: CLINIC | Age: 66
End: 2021-01-28

## 2021-01-28 DIAGNOSIS — Z01.811 PREOPERATIVE RESPIRATORY EXAMINATION: Primary | ICD-10-CM

## 2021-02-01 ENCOUNTER — HOSPITAL ENCOUNTER (OUTPATIENT)
Dept: RADIOLOGY | Facility: HOSPITAL | Age: 66
Discharge: HOME | End: 2021-02-01
Attending: NURSE PRACTITIONER
Payer: MEDICARE

## 2021-02-01 DIAGNOSIS — Z01.811 PREOPERATIVE RESPIRATORY EXAMINATION: ICD-10-CM

## 2021-02-01 PROCEDURE — 71046 X-RAY EXAM CHEST 2 VIEWS: CPT | Mod: TC,PO

## 2021-02-02 ENCOUNTER — OFFICE VISIT (OUTPATIENT)
Dept: FAMILY MEDICINE | Facility: CLINIC | Age: 66
End: 2021-02-02
Payer: MEDICARE

## 2021-02-02 VITALS
WEIGHT: 304 LBS | HEIGHT: 68 IN | OXYGEN SATURATION: 97 % | SYSTOLIC BLOOD PRESSURE: 100 MMHG | BODY MASS INDEX: 46.07 KG/M2 | TEMPERATURE: 98 F | DIASTOLIC BLOOD PRESSURE: 60 MMHG | HEART RATE: 70 BPM

## 2021-02-02 DIAGNOSIS — R79.89 ABNORMAL THYROID BLOOD TEST: ICD-10-CM

## 2021-02-02 DIAGNOSIS — E78.5 DYSLIPIDEMIA: ICD-10-CM

## 2021-02-02 DIAGNOSIS — R73.01 IMPAIRED FASTING GLUCOSE: ICD-10-CM

## 2021-02-02 DIAGNOSIS — Z01.811 PREOPERATIVE RESPIRATORY EXAMINATION: Primary | ICD-10-CM

## 2021-02-02 DIAGNOSIS — I10 ESSENTIAL (PRIMARY) HYPERTENSION: ICD-10-CM

## 2021-02-02 DIAGNOSIS — E55.9 VITAMIN D INSUFFICIENCY: ICD-10-CM

## 2021-02-02 DIAGNOSIS — I48.0 PAROXYSMAL ATRIAL FIBRILLATION: ICD-10-CM

## 2021-02-02 DIAGNOSIS — Z01.818 PREOPERATIVE CLEARANCE: ICD-10-CM

## 2021-02-02 DIAGNOSIS — E66.01 CLASS 3 SEVERE OBESITY DUE TO EXCESS CALORIES WITH SERIOUS COMORBIDITY AND BODY MASS INDEX (BMI) OF 45.0 TO 49.9 IN ADULT: ICD-10-CM

## 2021-02-02 PROCEDURE — 3288F FALL RISK ASSESSMENT DOCD: CPT | Mod: S$GLB,,, | Performed by: NURSE PRACTITIONER

## 2021-02-02 PROCEDURE — 3008F PR BODY MASS INDEX (BMI) DOCUMENTED: ICD-10-PCS | Mod: S$GLB,,, | Performed by: NURSE PRACTITIONER

## 2021-02-02 PROCEDURE — 3074F SYST BP LT 130 MM HG: CPT | Mod: S$GLB,,, | Performed by: NURSE PRACTITIONER

## 2021-02-02 PROCEDURE — 3078F PR MOST RECENT DIASTOLIC BLOOD PRESSURE < 80 MM HG: ICD-10-PCS | Mod: S$GLB,,, | Performed by: NURSE PRACTITIONER

## 2021-02-02 PROCEDURE — 3078F DIAST BP <80 MM HG: CPT | Mod: S$GLB,,, | Performed by: NURSE PRACTITIONER

## 2021-02-02 PROCEDURE — 1126F AMNT PAIN NOTED NONE PRSNT: CPT | Mod: S$GLB,,, | Performed by: NURSE PRACTITIONER

## 2021-02-02 PROCEDURE — 3288F PR FALLS RISK ASSESSMENT DOCUMENTED: ICD-10-PCS | Mod: S$GLB,,, | Performed by: NURSE PRACTITIONER

## 2021-02-02 PROCEDURE — 3074F PR MOST RECENT SYSTOLIC BLOOD PRESSURE < 130 MM HG: ICD-10-PCS | Mod: S$GLB,,, | Performed by: NURSE PRACTITIONER

## 2021-02-02 PROCEDURE — 1101F PR PT FALLS ASSESS DOC 0-1 FALLS W/OUT INJ PAST YR: ICD-10-PCS | Mod: S$GLB,,, | Performed by: NURSE PRACTITIONER

## 2021-02-02 PROCEDURE — 1159F PR MEDICATION LIST DOCUMENTED IN MEDICAL RECORD: ICD-10-PCS | Mod: S$GLB,,, | Performed by: NURSE PRACTITIONER

## 2021-02-02 PROCEDURE — 3008F BODY MASS INDEX DOCD: CPT | Mod: S$GLB,,, | Performed by: NURSE PRACTITIONER

## 2021-02-02 PROCEDURE — 1101F PT FALLS ASSESS-DOCD LE1/YR: CPT | Mod: S$GLB,,, | Performed by: NURSE PRACTITIONER

## 2021-02-02 PROCEDURE — 1126F PR PAIN SEVERITY QUANTIFIED, NO PAIN PRESENT: ICD-10-PCS | Mod: S$GLB,,, | Performed by: NURSE PRACTITIONER

## 2021-02-02 PROCEDURE — 1159F MED LIST DOCD IN RCRD: CPT | Mod: S$GLB,,, | Performed by: NURSE PRACTITIONER

## 2021-02-02 PROCEDURE — 99214 PR OFFICE/OUTPT VISIT, EST, LEVL IV, 30-39 MIN: ICD-10-PCS | Mod: S$GLB,,, | Performed by: NURSE PRACTITIONER

## 2021-02-02 PROCEDURE — 99214 OFFICE O/P EST MOD 30 MIN: CPT | Mod: S$GLB,,, | Performed by: NURSE PRACTITIONER

## 2021-02-12 ENCOUNTER — PATIENT MESSAGE (OUTPATIENT)
Dept: FAMILY MEDICINE | Facility: CLINIC | Age: 66
End: 2021-02-12

## 2021-02-12 DIAGNOSIS — I10 ESSENTIAL (PRIMARY) HYPERTENSION: ICD-10-CM

## 2021-02-12 DIAGNOSIS — E78.5 DYSLIPIDEMIA: ICD-10-CM

## 2021-02-17 DIAGNOSIS — E78.5 DYSLIPIDEMIA: ICD-10-CM

## 2021-02-17 DIAGNOSIS — I10 ESSENTIAL (PRIMARY) HYPERTENSION: ICD-10-CM

## 2021-02-17 RX ORDER — ATORVASTATIN CALCIUM 20 MG/1
20 TABLET, FILM COATED ORAL NIGHTLY
Qty: 30 TABLET | Refills: 0 | OUTPATIENT
Start: 2021-02-17 | End: 2022-02-17

## 2021-02-17 RX ORDER — ATORVASTATIN CALCIUM 20 MG/1
20 TABLET, FILM COATED ORAL NIGHTLY
Qty: 30 TABLET | Refills: 1 | OUTPATIENT
Start: 2021-02-17 | End: 2022-02-17

## 2021-02-17 RX ORDER — AMLODIPINE AND BENAZEPRIL HYDROCHLORIDE 5; 20 MG/1; MG/1
1 CAPSULE ORAL DAILY
Qty: 30 CAPSULE | Refills: 1 | OUTPATIENT
Start: 2021-02-17

## 2021-02-17 RX ORDER — AMLODIPINE AND BENAZEPRIL HYDROCHLORIDE 5; 20 MG/1; MG/1
1 CAPSULE ORAL DAILY
Qty: 30 CAPSULE | Refills: 0 | OUTPATIENT
Start: 2021-02-17

## 2021-02-18 DIAGNOSIS — E78.5 DYSLIPIDEMIA: ICD-10-CM

## 2021-02-18 DIAGNOSIS — I10 ESSENTIAL (PRIMARY) HYPERTENSION: ICD-10-CM

## 2021-02-18 RX ORDER — ATORVASTATIN CALCIUM 20 MG/1
20 TABLET, FILM COATED ORAL NIGHTLY
Qty: 14 TABLET | Refills: 0 | Status: SHIPPED | OUTPATIENT
Start: 2021-02-18 | End: 2021-06-02 | Stop reason: SDUPTHER

## 2021-02-18 RX ORDER — AMLODIPINE AND BENAZEPRIL HYDROCHLORIDE 5; 20 MG/1; MG/1
1 CAPSULE ORAL DAILY
Qty: 14 CAPSULE | Refills: 0 | Status: SHIPPED | OUTPATIENT
Start: 2021-02-18 | End: 2021-06-02 | Stop reason: SDUPTHER

## 2021-02-22 ENCOUNTER — OFFICE VISIT (OUTPATIENT)
Dept: FAMILY MEDICINE | Facility: CLINIC | Age: 66
End: 2021-02-22
Payer: MEDICARE

## 2021-02-22 VITALS
DIASTOLIC BLOOD PRESSURE: 70 MMHG | SYSTOLIC BLOOD PRESSURE: 120 MMHG | WEIGHT: 288 LBS | HEIGHT: 68 IN | TEMPERATURE: 98 F | HEART RATE: 65 BPM | OXYGEN SATURATION: 95 % | BODY MASS INDEX: 43.65 KG/M2

## 2021-02-22 DIAGNOSIS — E66.01 CLASS 3 SEVERE OBESITY DUE TO EXCESS CALORIES WITH SERIOUS COMORBIDITY AND BODY MASS INDEX (BMI) OF 40.0 TO 44.9 IN ADULT: ICD-10-CM

## 2021-02-22 DIAGNOSIS — J34.89 NOSTRIL INFECTION: Primary | ICD-10-CM

## 2021-02-22 DIAGNOSIS — J34.89 NASAL DRYNESS: ICD-10-CM

## 2021-02-22 DIAGNOSIS — J30.1 ALLERGIC RHINITIS DUE TO POLLEN, UNSPECIFIED SEASONALITY: ICD-10-CM

## 2021-02-22 DIAGNOSIS — Z98.890 S/P RIGHT KNEE SURGERY: ICD-10-CM

## 2021-02-22 PROCEDURE — 3078F PR MOST RECENT DIASTOLIC BLOOD PRESSURE < 80 MM HG: ICD-10-PCS | Mod: S$GLB,,, | Performed by: NURSE PRACTITIONER

## 2021-02-22 PROCEDURE — 3008F PR BODY MASS INDEX (BMI) DOCUMENTED: ICD-10-PCS | Mod: S$GLB,,, | Performed by: NURSE PRACTITIONER

## 2021-02-22 PROCEDURE — 1159F MED LIST DOCD IN RCRD: CPT | Mod: S$GLB,,, | Performed by: NURSE PRACTITIONER

## 2021-02-22 PROCEDURE — 3288F FALL RISK ASSESSMENT DOCD: CPT | Mod: S$GLB,,, | Performed by: NURSE PRACTITIONER

## 2021-02-22 PROCEDURE — 99214 OFFICE O/P EST MOD 30 MIN: CPT | Mod: S$GLB,,, | Performed by: NURSE PRACTITIONER

## 2021-02-22 PROCEDURE — 1101F PT FALLS ASSESS-DOCD LE1/YR: CPT | Mod: S$GLB,,, | Performed by: NURSE PRACTITIONER

## 2021-02-22 PROCEDURE — 3288F PR FALLS RISK ASSESSMENT DOCUMENTED: ICD-10-PCS | Mod: S$GLB,,, | Performed by: NURSE PRACTITIONER

## 2021-02-22 PROCEDURE — 3074F SYST BP LT 130 MM HG: CPT | Mod: S$GLB,,, | Performed by: NURSE PRACTITIONER

## 2021-02-22 PROCEDURE — 3078F DIAST BP <80 MM HG: CPT | Mod: S$GLB,,, | Performed by: NURSE PRACTITIONER

## 2021-02-22 PROCEDURE — 1159F PR MEDICATION LIST DOCUMENTED IN MEDICAL RECORD: ICD-10-PCS | Mod: S$GLB,,, | Performed by: NURSE PRACTITIONER

## 2021-02-22 PROCEDURE — 99214 PR OFFICE/OUTPT VISIT, EST, LEVL IV, 30-39 MIN: ICD-10-PCS | Mod: S$GLB,,, | Performed by: NURSE PRACTITIONER

## 2021-02-22 PROCEDURE — 1126F AMNT PAIN NOTED NONE PRSNT: CPT | Mod: S$GLB,,, | Performed by: NURSE PRACTITIONER

## 2021-02-22 PROCEDURE — 3008F BODY MASS INDEX DOCD: CPT | Mod: S$GLB,,, | Performed by: NURSE PRACTITIONER

## 2021-02-22 PROCEDURE — 1101F PR PT FALLS ASSESS DOC 0-1 FALLS W/OUT INJ PAST YR: ICD-10-PCS | Mod: S$GLB,,, | Performed by: NURSE PRACTITIONER

## 2021-02-22 PROCEDURE — 3074F PR MOST RECENT SYSTOLIC BLOOD PRESSURE < 130 MM HG: ICD-10-PCS | Mod: S$GLB,,, | Performed by: NURSE PRACTITIONER

## 2021-02-22 PROCEDURE — 1126F PR PAIN SEVERITY QUANTIFIED, NO PAIN PRESENT: ICD-10-PCS | Mod: S$GLB,,, | Performed by: NURSE PRACTITIONER

## 2021-02-22 RX ORDER — AZELASTINE 1 MG/ML
1 SPRAY, METERED NASAL 2 TIMES DAILY
Qty: 30 ML | Refills: 2 | Status: SHIPPED | OUTPATIENT
Start: 2021-02-22 | End: 2022-09-06

## 2021-02-22 RX ORDER — SULFAMETHOXAZOLE AND TRIMETHOPRIM 800; 160 MG/1; MG/1
TABLET ORAL
COMMUNITY
Start: 2021-02-18 | End: 2021-04-20 | Stop reason: CLARIF

## 2021-02-22 RX ORDER — OXYCODONE AND ACETAMINOPHEN 5; 325 MG/1; MG/1
TABLET ORAL
COMMUNITY
Start: 2021-02-10 | End: 2021-04-20 | Stop reason: CLARIF

## 2021-02-22 RX ORDER — MUPIROCIN 20 MG/G
OINTMENT TOPICAL 3 TIMES DAILY
Qty: 22 G | Refills: 1 | Status: SHIPPED | OUTPATIENT
Start: 2021-02-22 | End: 2021-06-28

## 2021-04-13 ENCOUNTER — PATIENT MESSAGE (OUTPATIENT)
Dept: FAMILY MEDICINE | Facility: CLINIC | Age: 66
End: 2021-04-13

## 2021-04-19 ENCOUNTER — APPOINTMENT (OUTPATIENT)
Dept: URGENT CARE | Facility: CLINIC | Age: 66
End: 2021-04-19
Payer: MEDICARE

## 2021-04-19 DIAGNOSIS — Z01.818 PRE-OP TESTING: ICD-10-CM

## 2021-04-19 PROCEDURE — U0005 INFEC AGEN DETEC AMPLI PROBE: HCPCS | Performed by: INTERNAL MEDICINE

## 2021-04-19 PROCEDURE — U0003 INFECTIOUS AGENT DETECTION BY NUCLEIC ACID (DNA OR RNA); SEVERE ACUTE RESPIRATORY SYNDROME CORONAVIRUS 2 (SARS-COV-2) (CORONAVIRUS DISEASE [COVID-19]), AMPLIFIED PROBE TECHNIQUE, MAKING USE OF HIGH THROUGHPUT TECHNOLOGIES AS DESCRIBED BY CMS-2020-01-R: HCPCS | Performed by: INTERNAL MEDICINE

## 2021-04-20 LAB — SARS-COV-2 RNA RESP QL NAA+PROBE: NOT DETECTED

## 2021-04-23 PROBLEM — I48.0 PAF (PAROXYSMAL ATRIAL FIBRILLATION): Status: ACTIVE | Noted: 2021-04-23

## 2021-04-29 ENCOUNTER — PATIENT MESSAGE (OUTPATIENT)
Dept: RESEARCH | Facility: HOSPITAL | Age: 66
End: 2021-04-29

## 2021-06-01 ENCOUNTER — PATIENT MESSAGE (OUTPATIENT)
Dept: FAMILY MEDICINE | Facility: CLINIC | Age: 66
End: 2021-06-01

## 2021-06-02 DIAGNOSIS — I10 ESSENTIAL (PRIMARY) HYPERTENSION: ICD-10-CM

## 2021-06-02 DIAGNOSIS — E78.5 DYSLIPIDEMIA: ICD-10-CM

## 2021-06-03 RX ORDER — ATORVASTATIN CALCIUM 20 MG/1
20 TABLET, FILM COATED ORAL NIGHTLY
Qty: 90 TABLET | Refills: 1 | Status: SHIPPED | OUTPATIENT
Start: 2021-06-03 | End: 2021-10-27

## 2021-06-03 RX ORDER — AMLODIPINE AND BENAZEPRIL HYDROCHLORIDE 5; 20 MG/1; MG/1
1 CAPSULE ORAL DAILY
Qty: 90 CAPSULE | Refills: 1 | Status: SHIPPED | OUTPATIENT
Start: 2021-06-03 | End: 2021-11-24

## 2021-06-15 ENCOUNTER — TELEPHONE (OUTPATIENT)
Dept: FAMILY MEDICINE | Facility: CLINIC | Age: 66
End: 2021-06-15

## 2021-06-20 LAB
CHOLEST SERPL-MCNC: 128 MG/DL
CHOLEST/HDLC SERPL: 3.6 (CALC)
HDLC SERPL-MCNC: 36 MG/DL
HIV 1+2 AB+HIV1 P24 AG SERPL QL IA: NORMAL
LDLC SERPL CALC-MCNC: 75 MG/DL (CALC)
NONHDLC SERPL-MCNC: 92 MG/DL (CALC)
TRIGL SERPL-MCNC: 83 MG/DL

## 2021-06-21 LAB
25(OH)D3 SERPL-MCNC: 34 NG/ML (ref 30–100)
ALBUMIN SERPL-MCNC: 4.1 G/DL (ref 3.6–5.1)
ALBUMIN/GLOB SERPL: 1.9 (CALC) (ref 1–2.5)
ALP SERPL-CCNC: 79 U/L (ref 35–144)
ALT SERPL-CCNC: 10 U/L (ref 9–46)
APPEARANCE UR: CLEAR
AST SERPL-CCNC: 9 U/L (ref 10–35)
BASOPHILS # BLD AUTO: 32 CELLS/UL (ref 0–200)
BASOPHILS NFR BLD AUTO: 0.7 %
BILIRUB SERPL-MCNC: 1.3 MG/DL (ref 0.2–1.2)
BILIRUB UR QL STRIP: NEGATIVE
BUN SERPL-MCNC: 14 MG/DL (ref 7–25)
BUN/CREAT SERPL: ABNORMAL (CALC) (ref 6–22)
CALCIUM SERPL-MCNC: 9.4 MG/DL (ref 8.6–10.3)
CHLORIDE SERPL-SCNC: 104 MMOL/L (ref 98–110)
CHOLEST SERPL-MCNC: 128 MG/DL
CHOLEST/HDLC SERPL: 3.6 (CALC)
CO2 SERPL-SCNC: 26 MMOL/L (ref 20–32)
COLOR UR: YELLOW
CREAT SERPL-MCNC: 1 MG/DL (ref 0.7–1.25)
EOSINOPHIL # BLD AUTO: 120 CELLS/UL (ref 15–500)
EOSINOPHIL NFR BLD AUTO: 2.6 %
ERYTHROCYTE [DISTWIDTH] IN BLOOD BY AUTOMATED COUNT: 14.9 % (ref 11–15)
GLOBULIN SER CALC-MCNC: 2.2 G/DL (CALC) (ref 1.9–3.7)
GLUCOSE SERPL-MCNC: 142 MG/DL (ref 65–99)
GLUCOSE UR QL STRIP: NEGATIVE
HBA1C MFR BLD: 5.6 % OF TOTAL HGB
HCT VFR BLD AUTO: 41 % (ref 38.5–50)
HCV AB S/CO SERPL IA: 0.05
HCV AB SERPL QL IA: NORMAL
HDLC SERPL-MCNC: 36 MG/DL
HGB BLD-MCNC: 13.2 G/DL (ref 13.2–17.1)
HGB UR QL STRIP: NEGATIVE
HIV 1+2 AB+HIV1 P24 AG SERPL QL IA: NORMAL
KETONES UR QL STRIP: NEGATIVE
LDLC SERPL CALC-MCNC: 75 MG/DL (CALC)
LEUKOCYTE ESTERASE UR QL STRIP: NEGATIVE
LYMPHOCYTES # BLD AUTO: 828 CELLS/UL (ref 850–3900)
LYMPHOCYTES NFR BLD AUTO: 18 %
MCH RBC QN AUTO: 28.6 PG (ref 27–33)
MCHC RBC AUTO-ENTMCNC: 32.2 G/DL (ref 32–36)
MCV RBC AUTO: 88.9 FL (ref 80–100)
MONOCYTES # BLD AUTO: 382 CELLS/UL (ref 200–950)
MONOCYTES NFR BLD AUTO: 8.3 %
NEUTROPHILS # BLD AUTO: 3238 CELLS/UL (ref 1500–7800)
NEUTROPHILS NFR BLD AUTO: 70.4 %
NITRITE UR QL STRIP: NEGATIVE
NONHDLC SERPL-MCNC: 92 MG/DL (CALC)
PH UR STRIP: 5.5 [PH] (ref 5–8)
PLATELET # BLD AUTO: 165 THOUSAND/UL (ref 140–400)
PMV BLD REES-ECKER: 10.8 FL (ref 7.5–12.5)
POTASSIUM SERPL-SCNC: 4.2 MMOL/L (ref 3.5–5.3)
PROT SERPL-MCNC: 6.3 G/DL (ref 6.1–8.1)
PROT UR QL STRIP: NEGATIVE
RBC # BLD AUTO: 4.61 MILLION/UL (ref 4.2–5.8)
SODIUM SERPL-SCNC: 138 MMOL/L (ref 135–146)
SP GR UR STRIP: 1.02 (ref 1–1.03)
T3FREE SERPL-MCNC: 3.1 PG/ML (ref 2.3–4.2)
T4 FREE SERPL-MCNC: 0.9 NG/DL (ref 0.8–1.8)
TRIGL SERPL-MCNC: 83 MG/DL
TSH SERPL-ACNC: 4.84 MIU/L (ref 0.4–4.5)
WBC # BLD AUTO: 4.6 THOUSAND/UL (ref 3.8–10.8)

## 2021-06-28 ENCOUNTER — OFFICE VISIT (OUTPATIENT)
Dept: FAMILY MEDICINE | Facility: CLINIC | Age: 66
End: 2021-06-28
Payer: MEDICARE

## 2021-06-28 VITALS
WEIGHT: 299 LBS | TEMPERATURE: 98 F | BODY MASS INDEX: 45.31 KG/M2 | SYSTOLIC BLOOD PRESSURE: 110 MMHG | HEART RATE: 79 BPM | OXYGEN SATURATION: 97 % | DIASTOLIC BLOOD PRESSURE: 66 MMHG | HEIGHT: 68 IN

## 2021-06-28 DIAGNOSIS — N52.1 ERECTILE DYSFUNCTION DUE TO DISEASES CLASSIFIED ELSEWHERE: ICD-10-CM

## 2021-06-28 DIAGNOSIS — R79.89 ABNORMAL THYROID BLOOD TEST: ICD-10-CM

## 2021-06-28 DIAGNOSIS — R73.01 IMPAIRED FASTING GLUCOSE: ICD-10-CM

## 2021-06-28 DIAGNOSIS — E55.9 VITAMIN D INSUFFICIENCY: ICD-10-CM

## 2021-06-28 DIAGNOSIS — C61 PROSTATE CANCER: ICD-10-CM

## 2021-06-28 DIAGNOSIS — Z12.5 SCREENING FOR MALIGNANT NEOPLASM OF PROSTATE: ICD-10-CM

## 2021-06-28 DIAGNOSIS — E78.5 DYSLIPIDEMIA: ICD-10-CM

## 2021-06-28 DIAGNOSIS — I48.0 PAROXYSMAL ATRIAL FIBRILLATION: ICD-10-CM

## 2021-06-28 DIAGNOSIS — E03.9 ACQUIRED HYPOTHYROIDISM: ICD-10-CM

## 2021-06-28 DIAGNOSIS — I10 ESSENTIAL (PRIMARY) HYPERTENSION: Primary | ICD-10-CM

## 2021-06-28 DIAGNOSIS — E66.01 CLASS 3 SEVERE OBESITY DUE TO EXCESS CALORIES WITH SERIOUS COMORBIDITY AND BODY MASS INDEX (BMI) OF 45.0 TO 49.9 IN ADULT: ICD-10-CM

## 2021-06-28 PROCEDURE — 1126F AMNT PAIN NOTED NONE PRSNT: CPT | Mod: S$GLB,,, | Performed by: NURSE PRACTITIONER

## 2021-06-28 PROCEDURE — 99214 OFFICE O/P EST MOD 30 MIN: CPT | Mod: S$GLB,,, | Performed by: NURSE PRACTITIONER

## 2021-06-28 PROCEDURE — 1170F PR FUNCTIONAL STATUS ASSESSED: ICD-10-PCS | Mod: S$GLB,,, | Performed by: NURSE PRACTITIONER

## 2021-06-28 PROCEDURE — 3008F BODY MASS INDEX DOCD: CPT | Mod: S$GLB,,, | Performed by: NURSE PRACTITIONER

## 2021-06-28 PROCEDURE — 1101F PT FALLS ASSESS-DOCD LE1/YR: CPT | Mod: S$GLB,,, | Performed by: NURSE PRACTITIONER

## 2021-06-28 PROCEDURE — 99214 PR OFFICE/OUTPT VISIT, EST, LEVL IV, 30-39 MIN: ICD-10-PCS | Mod: S$GLB,,, | Performed by: NURSE PRACTITIONER

## 2021-06-28 PROCEDURE — 3078F DIAST BP <80 MM HG: CPT | Mod: S$GLB,,, | Performed by: NURSE PRACTITIONER

## 2021-06-28 PROCEDURE — 3074F PR MOST RECENT SYSTOLIC BLOOD PRESSURE < 130 MM HG: ICD-10-PCS | Mod: S$GLB,,, | Performed by: NURSE PRACTITIONER

## 2021-06-28 PROCEDURE — 3074F SYST BP LT 130 MM HG: CPT | Mod: S$GLB,,, | Performed by: NURSE PRACTITIONER

## 2021-06-28 PROCEDURE — 3288F FALL RISK ASSESSMENT DOCD: CPT | Mod: S$GLB,,, | Performed by: NURSE PRACTITIONER

## 2021-06-28 PROCEDURE — 1170F FXNL STATUS ASSESSED: CPT | Mod: S$GLB,,, | Performed by: NURSE PRACTITIONER

## 2021-06-28 PROCEDURE — 3288F PR FALLS RISK ASSESSMENT DOCUMENTED: ICD-10-PCS | Mod: S$GLB,,, | Performed by: NURSE PRACTITIONER

## 2021-06-28 PROCEDURE — 3008F PR BODY MASS INDEX (BMI) DOCUMENTED: ICD-10-PCS | Mod: S$GLB,,, | Performed by: NURSE PRACTITIONER

## 2021-06-28 PROCEDURE — 1159F MED LIST DOCD IN RCRD: CPT | Mod: S$GLB,,, | Performed by: NURSE PRACTITIONER

## 2021-06-28 PROCEDURE — 1101F PR PT FALLS ASSESS DOC 0-1 FALLS W/OUT INJ PAST YR: ICD-10-PCS | Mod: S$GLB,,, | Performed by: NURSE PRACTITIONER

## 2021-06-28 PROCEDURE — 1126F PR PAIN SEVERITY QUANTIFIED, NO PAIN PRESENT: ICD-10-PCS | Mod: S$GLB,,, | Performed by: NURSE PRACTITIONER

## 2021-06-28 PROCEDURE — 3078F PR MOST RECENT DIASTOLIC BLOOD PRESSURE < 80 MM HG: ICD-10-PCS | Mod: S$GLB,,, | Performed by: NURSE PRACTITIONER

## 2021-06-28 PROCEDURE — 1159F PR MEDICATION LIST DOCUMENTED IN MEDICAL RECORD: ICD-10-PCS | Mod: S$GLB,,, | Performed by: NURSE PRACTITIONER

## 2021-06-28 RX ORDER — LEVOTHYROXINE SODIUM 25 UG/1
25 TABLET ORAL
Qty: 90 TABLET | Refills: 1 | Status: SHIPPED | OUTPATIENT
Start: 2021-06-28 | End: 2021-09-22

## 2021-06-28 RX ORDER — TADALAFIL 20 MG/1
20 TABLET ORAL DAILY
Qty: 30 TABLET | Refills: 5 | Status: SHIPPED | OUTPATIENT
Start: 2021-06-28 | End: 2022-05-17

## 2021-09-12 LAB
ALBUMIN SERPL-MCNC: 4.1 G/DL (ref 3.6–5.1)
ALBUMIN/GLOB SERPL: 2 (CALC) (ref 1–2.5)
ALP SERPL-CCNC: 69 U/L (ref 35–144)
ALT SERPL-CCNC: 15 U/L (ref 9–46)
AST SERPL-CCNC: 12 U/L (ref 10–35)
BILIRUB SERPL-MCNC: 1.8 MG/DL (ref 0.2–1.2)
BUN SERPL-MCNC: 18 MG/DL (ref 7–25)
BUN/CREAT SERPL: ABNORMAL (CALC) (ref 6–22)
CALCIUM SERPL-MCNC: 9.4 MG/DL (ref 8.6–10.3)
CHLORIDE SERPL-SCNC: 105 MMOL/L (ref 98–110)
CO2 SERPL-SCNC: 26 MMOL/L (ref 20–32)
CREAT SERPL-MCNC: 1.02 MG/DL (ref 0.7–1.25)
GLOBULIN SER CALC-MCNC: 2.1 G/DL (CALC) (ref 1.9–3.7)
GLUCOSE SERPL-MCNC: 136 MG/DL (ref 65–99)
HBA1C MFR BLD: 5.7 % OF TOTAL HGB
POTASSIUM SERPL-SCNC: 4.1 MMOL/L (ref 3.5–5.3)
PROT SERPL-MCNC: 6.2 G/DL (ref 6.1–8.1)
PSA SERPL-MCNC: <0.1 NG/ML
SODIUM SERPL-SCNC: 137 MMOL/L (ref 135–146)
T3FREE SERPL-MCNC: 3.2 PG/ML (ref 2.3–4.2)
T4 FREE SERPL-MCNC: 1 NG/DL (ref 0.8–1.8)
TSH SERPL-ACNC: 3.87 MIU/L (ref 0.4–4.5)

## 2021-09-22 ENCOUNTER — OFFICE VISIT (OUTPATIENT)
Dept: FAMILY MEDICINE | Facility: CLINIC | Age: 66
End: 2021-09-22
Payer: MEDICARE

## 2021-09-22 VITALS
TEMPERATURE: 99 F | SYSTOLIC BLOOD PRESSURE: 110 MMHG | HEIGHT: 68 IN | OXYGEN SATURATION: 97 % | DIASTOLIC BLOOD PRESSURE: 68 MMHG | HEART RATE: 70 BPM | WEIGHT: 311 LBS | BODY MASS INDEX: 47.13 KG/M2

## 2021-09-22 DIAGNOSIS — E03.9 ACQUIRED HYPOTHYROIDISM: Primary | ICD-10-CM

## 2021-09-22 DIAGNOSIS — I48.0 PAROXYSMAL ATRIAL FIBRILLATION: ICD-10-CM

## 2021-09-22 DIAGNOSIS — I10 ESSENTIAL (PRIMARY) HYPERTENSION: ICD-10-CM

## 2021-09-22 DIAGNOSIS — R73.01 IMPAIRED FASTING GLUCOSE: ICD-10-CM

## 2021-09-22 DIAGNOSIS — E78.5 DYSLIPIDEMIA: ICD-10-CM

## 2021-09-22 DIAGNOSIS — E66.01 CLASS 3 SEVERE OBESITY DUE TO EXCESS CALORIES WITH SERIOUS COMORBIDITY AND BODY MASS INDEX (BMI) OF 45.0 TO 49.9 IN ADULT: ICD-10-CM

## 2021-09-22 DIAGNOSIS — Z23 NEED FOR INFLUENZA VACCINATION: ICD-10-CM

## 2021-09-22 DIAGNOSIS — E55.9 VITAMIN D INSUFFICIENCY: ICD-10-CM

## 2021-09-22 PROCEDURE — 3074F PR MOST RECENT SYSTOLIC BLOOD PRESSURE < 130 MM HG: ICD-10-PCS | Mod: S$GLB,,, | Performed by: NURSE PRACTITIONER

## 2021-09-22 PROCEDURE — 1159F MED LIST DOCD IN RCRD: CPT | Mod: S$GLB,,, | Performed by: NURSE PRACTITIONER

## 2021-09-22 PROCEDURE — 1160F PR REVIEW ALL MEDS BY PRESCRIBER/CLIN PHARMACIST DOCUMENTED: ICD-10-PCS | Mod: S$GLB,,, | Performed by: NURSE PRACTITIONER

## 2021-09-22 PROCEDURE — 90662 FLU VACCINE - QUADRIVALENT - HIGH DOSE (65+) PRESERVATIVE FREE IM: ICD-10-PCS | Mod: S$GLB,,, | Performed by: NURSE PRACTITIONER

## 2021-09-22 PROCEDURE — 1170F FXNL STATUS ASSESSED: CPT | Mod: S$GLB,,, | Performed by: NURSE PRACTITIONER

## 2021-09-22 PROCEDURE — 3078F PR MOST RECENT DIASTOLIC BLOOD PRESSURE < 80 MM HG: ICD-10-PCS | Mod: S$GLB,,, | Performed by: NURSE PRACTITIONER

## 2021-09-22 PROCEDURE — 1160F RVW MEDS BY RX/DR IN RCRD: CPT | Mod: S$GLB,,, | Performed by: NURSE PRACTITIONER

## 2021-09-22 PROCEDURE — G0008 FLU VACCINE - QUADRIVALENT - HIGH DOSE (65+) PRESERVATIVE FREE IM: ICD-10-PCS | Mod: S$GLB,,, | Performed by: NURSE PRACTITIONER

## 2021-09-22 PROCEDURE — 99214 OFFICE O/P EST MOD 30 MIN: CPT | Mod: 25,S$GLB,, | Performed by: NURSE PRACTITIONER

## 2021-09-22 PROCEDURE — 3044F PR MOST RECENT HEMOGLOBIN A1C LEVEL <7.0%: ICD-10-PCS | Mod: S$GLB,,, | Performed by: NURSE PRACTITIONER

## 2021-09-22 PROCEDURE — 4010F ACE/ARB THERAPY RXD/TAKEN: CPT | Mod: S$GLB,,, | Performed by: NURSE PRACTITIONER

## 2021-09-22 PROCEDURE — 1126F AMNT PAIN NOTED NONE PRSNT: CPT | Mod: S$GLB,,, | Performed by: NURSE PRACTITIONER

## 2021-09-22 PROCEDURE — G0008 ADMIN INFLUENZA VIRUS VAC: HCPCS | Mod: S$GLB,,, | Performed by: NURSE PRACTITIONER

## 2021-09-22 PROCEDURE — 3288F PR FALLS RISK ASSESSMENT DOCUMENTED: ICD-10-PCS | Mod: S$GLB,,, | Performed by: NURSE PRACTITIONER

## 2021-09-22 PROCEDURE — 3078F DIAST BP <80 MM HG: CPT | Mod: S$GLB,,, | Performed by: NURSE PRACTITIONER

## 2021-09-22 PROCEDURE — 3008F BODY MASS INDEX DOCD: CPT | Mod: S$GLB,,, | Performed by: NURSE PRACTITIONER

## 2021-09-22 PROCEDURE — 3074F SYST BP LT 130 MM HG: CPT | Mod: S$GLB,,, | Performed by: NURSE PRACTITIONER

## 2021-09-22 PROCEDURE — 1126F PR PAIN SEVERITY QUANTIFIED, NO PAIN PRESENT: ICD-10-PCS | Mod: S$GLB,,, | Performed by: NURSE PRACTITIONER

## 2021-09-22 PROCEDURE — 99214 PR OFFICE/OUTPT VISIT, EST, LEVL IV, 30-39 MIN: ICD-10-PCS | Mod: 25,S$GLB,, | Performed by: NURSE PRACTITIONER

## 2021-09-22 PROCEDURE — 1170F PR FUNCTIONAL STATUS ASSESSED: ICD-10-PCS | Mod: S$GLB,,, | Performed by: NURSE PRACTITIONER

## 2021-09-22 PROCEDURE — 3044F HG A1C LEVEL LT 7.0%: CPT | Mod: S$GLB,,, | Performed by: NURSE PRACTITIONER

## 2021-09-22 PROCEDURE — 1159F PR MEDICATION LIST DOCUMENTED IN MEDICAL RECORD: ICD-10-PCS | Mod: S$GLB,,, | Performed by: NURSE PRACTITIONER

## 2021-09-22 PROCEDURE — 90662 IIV NO PRSV INCREASED AG IM: CPT | Mod: S$GLB,,, | Performed by: NURSE PRACTITIONER

## 2021-09-22 PROCEDURE — 1101F PR PT FALLS ASSESS DOC 0-1 FALLS W/OUT INJ PAST YR: ICD-10-PCS | Mod: S$GLB,,, | Performed by: NURSE PRACTITIONER

## 2021-09-22 PROCEDURE — 4010F PR ACE/ARB THEARPY RXD/TAKEN: ICD-10-PCS | Mod: S$GLB,,, | Performed by: NURSE PRACTITIONER

## 2021-09-22 PROCEDURE — 3288F FALL RISK ASSESSMENT DOCD: CPT | Mod: S$GLB,,, | Performed by: NURSE PRACTITIONER

## 2021-09-22 PROCEDURE — 1101F PT FALLS ASSESS-DOCD LE1/YR: CPT | Mod: S$GLB,,, | Performed by: NURSE PRACTITIONER

## 2021-09-22 PROCEDURE — 3008F PR BODY MASS INDEX (BMI) DOCUMENTED: ICD-10-PCS | Mod: S$GLB,,, | Performed by: NURSE PRACTITIONER

## 2021-09-22 RX ORDER — LEVOTHYROXINE SODIUM 50 UG/1
50 TABLET ORAL
Qty: 90 TABLET | Refills: 1 | Status: SHIPPED | OUTPATIENT
Start: 2021-09-22 | End: 2022-03-17

## 2021-09-22 RX ORDER — LEVOTHYROXINE SODIUM 50 UG/1
50 TABLET ORAL
Qty: 30 TABLET | Refills: 0 | Status: SHIPPED | OUTPATIENT
Start: 2021-09-22 | End: 2022-06-15

## 2021-09-22 RX ORDER — LEVOTHYROXINE SODIUM 25 UG/1
25 TABLET ORAL
Qty: 90 TABLET | Refills: 1 | Status: CANCELLED | OUTPATIENT
Start: 2021-09-22 | End: 2022-09-22

## 2021-10-24 ENCOUNTER — PATIENT MESSAGE (OUTPATIENT)
Dept: UROLOGY | Facility: CLINIC | Age: 66
End: 2021-10-24
Payer: MEDICARE

## 2021-10-25 ENCOUNTER — PATIENT MESSAGE (OUTPATIENT)
Dept: UROLOGY | Facility: CLINIC | Age: 66
End: 2021-10-25
Payer: MEDICARE

## 2021-10-27 ENCOUNTER — TELEPHONE (OUTPATIENT)
Dept: UROLOGY | Facility: CLINIC | Age: 66
End: 2021-10-27

## 2021-10-27 ENCOUNTER — OFFICE VISIT (OUTPATIENT)
Dept: UROLOGY | Facility: CLINIC | Age: 66
End: 2021-10-27
Payer: MEDICARE

## 2021-10-27 VITALS
HEIGHT: 68 IN | SYSTOLIC BLOOD PRESSURE: 131 MMHG | WEIGHT: 308 LBS | HEART RATE: 91 BPM | TEMPERATURE: 99 F | BODY MASS INDEX: 46.68 KG/M2 | DIASTOLIC BLOOD PRESSURE: 78 MMHG

## 2021-10-27 DIAGNOSIS — R31.0 GROSS HEMATURIA: Primary | ICD-10-CM

## 2021-10-27 DIAGNOSIS — C61 MALIGNANT NEOPLASM OF PROSTATE: ICD-10-CM

## 2021-10-27 LAB
BILIRUB SERPL-MCNC: ABNORMAL MG/DL
BILIRUB UR QL STRIP: NEGATIVE
BLOOD URINE, POC: ABNORMAL
CLARITY UR: ABNORMAL
CLARITY, POC UA: ABNORMAL
COLOR UR: ABNORMAL
COLOR, POC UA: ABNORMAL
GLUCOSE UR QL STRIP: ABNORMAL
GLUCOSE UR QL STRIP: NEGATIVE
HGB UR QL STRIP: ABNORMAL
KETONES UR QL STRIP: ABNORMAL
KETONES UR QL STRIP: NEGATIVE
LEUKOCYTE ESTERASE UR QL STRIP: NEGATIVE
LEUKOCYTE ESTERASE URINE, POC: ABNORMAL
MICROSCOPIC COMMENT: ABNORMAL
NITRITE UR QL STRIP: NEGATIVE
NITRITE, POC UA: ABNORMAL
PH UR STRIP: 8 [PH] (ref 5–8)
PH, POC UA: 8
POC RESIDUAL URINE VOLUME: 50 ML (ref 0–100)
PROT UR QL STRIP: NEGATIVE
PROTEIN, POC: ABNORMAL
RBC #/AREA URNS HPF: >100 /HPF (ref 0–4)
SP GR UR STRIP: 1.01 (ref 1–1.03)
SPECIFIC GRAVITY, POC UA: 1.02
URN SPEC COLLECT METH UR: ABNORMAL
UROBILINOGEN UR STRIP-ACNC: 1 EU/DL
UROBILINOGEN, POC UA: 1
WBC #/AREA URNS HPF: 10 /HPF (ref 0–5)

## 2021-10-27 PROCEDURE — 81002 POCT URINE DIPSTICK WITHOUT MICROSCOPE: ICD-10-PCS | Mod: S$GLB,,, | Performed by: NURSE PRACTITIONER

## 2021-10-27 PROCEDURE — 99214 PR OFFICE/OUTPT VISIT, EST, LEVL IV, 30-39 MIN: ICD-10-PCS | Mod: S$GLB,,, | Performed by: NURSE PRACTITIONER

## 2021-10-27 PROCEDURE — 81000 URINALYSIS NONAUTO W/SCOPE: CPT | Performed by: NURSE PRACTITIONER

## 2021-10-27 PROCEDURE — 1101F PT FALLS ASSESS-DOCD LE1/YR: CPT | Mod: S$GLB,,, | Performed by: NURSE PRACTITIONER

## 2021-10-27 PROCEDURE — 1126F AMNT PAIN NOTED NONE PRSNT: CPT | Mod: S$GLB,,, | Performed by: NURSE PRACTITIONER

## 2021-10-27 PROCEDURE — 3044F PR MOST RECENT HEMOGLOBIN A1C LEVEL <7.0%: ICD-10-PCS | Mod: S$GLB,,, | Performed by: NURSE PRACTITIONER

## 2021-10-27 PROCEDURE — 1126F PR PAIN SEVERITY QUANTIFIED, NO PAIN PRESENT: ICD-10-PCS | Mod: S$GLB,,, | Performed by: NURSE PRACTITIONER

## 2021-10-27 PROCEDURE — 88112 CYTOPATH CELL ENHANCE TECH: CPT | Mod: 26,,, | Performed by: PATHOLOGY

## 2021-10-27 PROCEDURE — 51798 US URINE CAPACITY MEASURE: CPT | Mod: S$GLB,,, | Performed by: NURSE PRACTITIONER

## 2021-10-27 PROCEDURE — 1160F PR REVIEW ALL MEDS BY PRESCRIBER/CLIN PHARMACIST DOCUMENTED: ICD-10-PCS | Mod: S$GLB,,, | Performed by: NURSE PRACTITIONER

## 2021-10-27 PROCEDURE — 88112 PR  CYTOPATH, CELL ENHANCE TECH: ICD-10-PCS | Mod: 26,,, | Performed by: PATHOLOGY

## 2021-10-27 PROCEDURE — 3008F PR BODY MASS INDEX (BMI) DOCUMENTED: ICD-10-PCS | Mod: S$GLB,,, | Performed by: NURSE PRACTITIONER

## 2021-10-27 PROCEDURE — 1159F MED LIST DOCD IN RCRD: CPT | Mod: S$GLB,,, | Performed by: NURSE PRACTITIONER

## 2021-10-27 PROCEDURE — 3008F BODY MASS INDEX DOCD: CPT | Mod: S$GLB,,, | Performed by: NURSE PRACTITIONER

## 2021-10-27 PROCEDURE — 4010F PR ACE/ARB THEARPY RXD/TAKEN: ICD-10-PCS | Mod: S$GLB,,, | Performed by: NURSE PRACTITIONER

## 2021-10-27 PROCEDURE — 3288F PR FALLS RISK ASSESSMENT DOCUMENTED: ICD-10-PCS | Mod: S$GLB,,, | Performed by: NURSE PRACTITIONER

## 2021-10-27 PROCEDURE — 87086 URINE CULTURE/COLONY COUNT: CPT | Performed by: NURSE PRACTITIONER

## 2021-10-27 PROCEDURE — 99214 OFFICE O/P EST MOD 30 MIN: CPT | Mod: S$GLB,,, | Performed by: NURSE PRACTITIONER

## 2021-10-27 PROCEDURE — 4010F ACE/ARB THERAPY RXD/TAKEN: CPT | Mod: S$GLB,,, | Performed by: NURSE PRACTITIONER

## 2021-10-27 PROCEDURE — 3044F HG A1C LEVEL LT 7.0%: CPT | Mod: S$GLB,,, | Performed by: NURSE PRACTITIONER

## 2021-10-27 PROCEDURE — 81002 URINALYSIS NONAUTO W/O SCOPE: CPT | Mod: S$GLB,,, | Performed by: NURSE PRACTITIONER

## 2021-10-27 PROCEDURE — 3288F FALL RISK ASSESSMENT DOCD: CPT | Mod: S$GLB,,, | Performed by: NURSE PRACTITIONER

## 2021-10-27 PROCEDURE — 1159F PR MEDICATION LIST DOCUMENTED IN MEDICAL RECORD: ICD-10-PCS | Mod: S$GLB,,, | Performed by: NURSE PRACTITIONER

## 2021-10-27 PROCEDURE — 99999 PR PBB SHADOW E&M-EST. PATIENT-LVL IV: ICD-10-PCS | Mod: PBBFAC,,, | Performed by: NURSE PRACTITIONER

## 2021-10-27 PROCEDURE — 3078F DIAST BP <80 MM HG: CPT | Mod: S$GLB,,, | Performed by: NURSE PRACTITIONER

## 2021-10-27 PROCEDURE — 1101F PR PT FALLS ASSESS DOC 0-1 FALLS W/OUT INJ PAST YR: ICD-10-PCS | Mod: S$GLB,,, | Performed by: NURSE PRACTITIONER

## 2021-10-27 PROCEDURE — 3078F PR MOST RECENT DIASTOLIC BLOOD PRESSURE < 80 MM HG: ICD-10-PCS | Mod: S$GLB,,, | Performed by: NURSE PRACTITIONER

## 2021-10-27 PROCEDURE — 88112 CYTOPATH CELL ENHANCE TECH: CPT | Performed by: PATHOLOGY

## 2021-10-27 PROCEDURE — 3075F PR MOST RECENT SYSTOLIC BLOOD PRESS GE 130-139MM HG: ICD-10-PCS | Mod: S$GLB,,, | Performed by: NURSE PRACTITIONER

## 2021-10-27 PROCEDURE — 99999 PR PBB SHADOW E&M-EST. PATIENT-LVL IV: CPT | Mod: PBBFAC,,, | Performed by: NURSE PRACTITIONER

## 2021-10-27 PROCEDURE — 1160F RVW MEDS BY RX/DR IN RCRD: CPT | Mod: S$GLB,,, | Performed by: NURSE PRACTITIONER

## 2021-10-27 PROCEDURE — 3075F SYST BP GE 130 - 139MM HG: CPT | Mod: S$GLB,,, | Performed by: NURSE PRACTITIONER

## 2021-10-27 PROCEDURE — 51798 POCT BLADDER SCAN: ICD-10-PCS | Mod: S$GLB,,, | Performed by: NURSE PRACTITIONER

## 2021-10-27 RX ORDER — LIDOCAINE HYDROCHLORIDE 20 MG/ML
JELLY TOPICAL ONCE
Status: CANCELLED | OUTPATIENT
Start: 2021-10-27 | End: 2021-10-27

## 2021-10-28 ENCOUNTER — HOSPITAL ENCOUNTER (OUTPATIENT)
Dept: RADIOLOGY | Facility: HOSPITAL | Age: 66
Discharge: HOME | End: 2021-10-28
Attending: NURSE PRACTITIONER
Payer: MEDICARE

## 2021-10-28 DIAGNOSIS — C61 MALIGNANT NEOPLASM OF PROSTATE: ICD-10-CM

## 2021-10-28 DIAGNOSIS — R31.0 GROSS HEMATURIA: ICD-10-CM

## 2021-10-28 LAB
CREAT SERPL-MCNC: 1 MG/DL (ref 0.5–1.4)
SAMPLE: NORMAL

## 2021-10-28 PROCEDURE — 25500020 PHARM REV CODE 255

## 2021-10-28 PROCEDURE — 74178 CT ABD&PLV WO CNTR FLWD CNTR: CPT | Mod: TC

## 2021-10-28 PROCEDURE — 74178 CT ABD&PLV WO CNTR FLWD CNTR: CPT | Mod: 26,,, | Performed by: RADIOLOGY

## 2021-10-28 PROCEDURE — 74178 CT UROGRAM ABD PELVIS W WO: ICD-10-PCS | Mod: 26,,, | Performed by: RADIOLOGY

## 2021-10-28 RX ADMIN — IOHEXOL 125 ML: 350 INJECTION, SOLUTION INTRAVENOUS at 12:10

## 2021-10-29 ENCOUNTER — PATIENT MESSAGE (OUTPATIENT)
Dept: UROLOGY | Facility: CLINIC | Age: 66
End: 2021-10-29
Payer: MEDICARE

## 2021-10-29 LAB
BACTERIA UR CULT: NORMAL
FINAL PATHOLOGIC DIAGNOSIS: NORMAL
Lab: NORMAL

## 2021-10-29 RX ORDER — TAMSULOSIN HYDROCHLORIDE 0.4 MG/1
0.8 CAPSULE ORAL
Qty: 180 CAPSULE | Refills: 0 | Status: SHIPPED | OUTPATIENT
Start: 2021-10-29 | End: 2021-11-03 | Stop reason: SDUPTHER

## 2021-11-01 ENCOUNTER — TELEPHONE (OUTPATIENT)
Dept: UROLOGY | Facility: CLINIC | Age: 66
End: 2021-11-01
Payer: MEDICARE

## 2021-11-01 DIAGNOSIS — N20.1 URETEROLITHIASIS: ICD-10-CM

## 2021-11-01 DIAGNOSIS — N20.0 NEPHROLITHIASIS: ICD-10-CM

## 2021-11-01 DIAGNOSIS — R31.0 GROSS HEMATURIA: Primary | ICD-10-CM

## 2021-11-02 RX ORDER — OXYCODONE HYDROCHLORIDE 5 MG/1
5 TABLET ORAL
Status: CANCELLED | OUTPATIENT
Start: 2021-11-02

## 2021-11-02 RX ORDER — ONDANSETRON 2 MG/ML
4 INJECTION INTRAMUSCULAR; INTRAVENOUS DAILY PRN
Status: CANCELLED | OUTPATIENT
Start: 2021-11-02

## 2021-11-02 RX ORDER — MEPERIDINE HYDROCHLORIDE 50 MG/ML
12.5 INJECTION INTRAMUSCULAR; INTRAVENOUS; SUBCUTANEOUS ONCE AS NEEDED
Status: CANCELLED | OUTPATIENT
Start: 2021-11-02 | End: 2021-11-03

## 2021-11-02 RX ORDER — DIPHENHYDRAMINE HYDROCHLORIDE 50 MG/ML
25 INJECTION INTRAMUSCULAR; INTRAVENOUS EVERY 6 HOURS PRN
Status: CANCELLED | OUTPATIENT
Start: 2021-11-02

## 2021-11-02 RX ORDER — SODIUM CHLORIDE 0.9 % (FLUSH) 0.9 %
3 SYRINGE (ML) INJECTION
Status: CANCELLED | OUTPATIENT
Start: 2021-11-02

## 2021-11-02 RX ORDER — FENTANYL CITRATE 50 UG/ML
25 INJECTION, SOLUTION INTRAMUSCULAR; INTRAVENOUS EVERY 5 MIN PRN
Status: CANCELLED | OUTPATIENT
Start: 2021-11-02

## 2021-11-02 RX ORDER — SODIUM CHLORIDE 0.9 % (FLUSH) 0.9 %
3 SYRINGE (ML) INJECTION EVERY 8 HOURS
Status: CANCELLED | OUTPATIENT
Start: 2021-11-02

## 2021-11-02 RX ORDER — SODIUM CHLORIDE, SODIUM LACTATE, POTASSIUM CHLORIDE, CALCIUM CHLORIDE 600; 310; 30; 20 MG/100ML; MG/100ML; MG/100ML; MG/100ML
INJECTION, SOLUTION INTRAVENOUS CONTINUOUS
Status: CANCELLED | OUTPATIENT
Start: 2021-11-02

## 2021-11-02 RX ORDER — HYDROMORPHONE HYDROCHLORIDE 2 MG/ML
0.2 INJECTION, SOLUTION INTRAMUSCULAR; INTRAVENOUS; SUBCUTANEOUS EVERY 5 MIN PRN
Status: CANCELLED | OUTPATIENT
Start: 2021-11-02

## 2021-11-03 ENCOUNTER — TELEPHONE (OUTPATIENT)
Dept: UROLOGY | Facility: CLINIC | Age: 66
End: 2021-11-03
Payer: MEDICARE

## 2021-11-03 ENCOUNTER — HOSPITAL ENCOUNTER (OUTPATIENT)
Facility: HOSPITAL | Age: 66
Discharge: HOME | End: 2021-11-03
Attending: UROLOGY | Admitting: UROLOGY

## 2021-11-03 VITALS
RESPIRATION RATE: 16 BRPM | SYSTOLIC BLOOD PRESSURE: 121 MMHG | HEART RATE: 75 BPM | TEMPERATURE: 98 F | DIASTOLIC BLOOD PRESSURE: 80 MMHG | BODY MASS INDEX: 46.98 KG/M2 | OXYGEN SATURATION: 100 % | HEIGHT: 68 IN | WEIGHT: 310 LBS

## 2021-11-03 DIAGNOSIS — N20.0 NEPHROLITHIASIS: ICD-10-CM

## 2021-11-03 DIAGNOSIS — C61 MALIGNANT NEOPLASM OF PROSTATE: ICD-10-CM

## 2021-11-03 DIAGNOSIS — N20.1 URETEROLITHIASIS: Primary | ICD-10-CM

## 2021-11-03 DIAGNOSIS — N20.1 URETEROLITHIASIS: ICD-10-CM

## 2021-11-03 PROCEDURE — 25000003 PHARM REV CODE 250: Performed by: ANESTHESIOLOGY

## 2021-11-03 RX ORDER — LIDOCAINE HYDROCHLORIDE 10 MG/ML
0.5 INJECTION, SOLUTION EPIDURAL; INFILTRATION; INTRACAUDAL; PERINEURAL ONCE
Status: DISCONTINUED | OUTPATIENT
Start: 2021-11-03 | End: 2022-09-06

## 2021-11-03 RX ORDER — TAMSULOSIN HYDROCHLORIDE 0.4 MG/1
0.8 CAPSULE ORAL
Qty: 60 CAPSULE | Refills: 0 | Status: ON HOLD | OUTPATIENT
Start: 2021-11-03 | End: 2021-11-10 | Stop reason: SDUPTHER

## 2021-11-03 RX ADMIN — SODIUM CHLORIDE, SODIUM GLUCONATE, SODIUM ACETATE, POTASSIUM CHLORIDE, MAGNESIUM CHLORIDE, SODIUM PHOSPHATE, DIBASIC, AND POTASSIUM PHOSPHATE: .53; .5; .37; .037; .03; .012; .00082 INJECTION, SOLUTION INTRAVENOUS at 01:11

## 2021-11-06 ENCOUNTER — PATIENT MESSAGE (OUTPATIENT)
Dept: SURGERY | Facility: HOSPITAL | Age: 66
End: 2021-11-06
Payer: MEDICARE

## 2021-11-08 ENCOUNTER — HOSPITAL ENCOUNTER (OUTPATIENT)
Dept: RADIOLOGY | Facility: HOSPITAL | Age: 66
Discharge: HOME | End: 2021-11-08
Attending: UROLOGY
Payer: MEDICARE

## 2021-11-08 DIAGNOSIS — N20.0 NEPHROLITHIASIS: ICD-10-CM

## 2021-11-08 PROCEDURE — 74176 CT RENAL STONE STUDY ABD PELVIS WO: ICD-10-PCS | Mod: 26,,, | Performed by: RADIOLOGY

## 2021-11-08 PROCEDURE — 74176 CT ABD & PELVIS W/O CONTRAST: CPT | Mod: 26,,, | Performed by: RADIOLOGY

## 2021-11-08 PROCEDURE — 74176 CT ABD & PELVIS W/O CONTRAST: CPT | Mod: TC

## 2021-11-09 ENCOUNTER — TELEPHONE (OUTPATIENT)
Dept: UROLOGY | Facility: CLINIC | Age: 66
End: 2021-11-09
Payer: MEDICARE

## 2021-11-10 ENCOUNTER — HOSPITAL ENCOUNTER (OUTPATIENT)
Facility: HOSPITAL | Age: 66
Discharge: HOME | End: 2021-11-10
Attending: UROLOGY | Admitting: UROLOGY
Payer: MEDICARE

## 2021-11-10 ENCOUNTER — ANESTHESIA EVENT (OUTPATIENT)
Dept: SURGERY | Facility: HOSPITAL | Age: 66
End: 2021-11-10
Payer: MEDICARE

## 2021-11-10 ENCOUNTER — TELEPHONE (OUTPATIENT)
Dept: UROLOGY | Facility: CLINIC | Age: 66
End: 2021-11-10

## 2021-11-10 ENCOUNTER — ANESTHESIA (OUTPATIENT)
Dept: SURGERY | Facility: HOSPITAL | Age: 66
End: 2021-11-10
Payer: MEDICARE

## 2021-11-10 VITALS
TEMPERATURE: 98 F | DIASTOLIC BLOOD PRESSURE: 62 MMHG | RESPIRATION RATE: 18 BRPM | WEIGHT: 305 LBS | HEIGHT: 68 IN | OXYGEN SATURATION: 96 % | HEART RATE: 73 BPM | SYSTOLIC BLOOD PRESSURE: 101 MMHG | BODY MASS INDEX: 46.23 KG/M2

## 2021-11-10 DIAGNOSIS — N20.0 NEPHROLITHIASIS: ICD-10-CM

## 2021-11-10 DIAGNOSIS — N20.1 URETEROLITHIASIS: ICD-10-CM

## 2021-11-10 LAB
ALBUMIN SERPL BCP-MCNC: 3.8 G/DL (ref 3.5–5.2)
ALP SERPL-CCNC: 66 U/L (ref 55–135)
ALT SERPL W/O P-5'-P-CCNC: 14 U/L (ref 10–44)
ANION GAP SERPL CALC-SCNC: 7 MMOL/L (ref 8–16)
APTT BLDCRRT: 26.4 SEC (ref 21–32)
AST SERPL-CCNC: 13 U/L (ref 10–40)
BASOPHILS # BLD AUTO: 0.02 K/UL (ref 0–0.2)
BASOPHILS NFR BLD: 0.4 % (ref 0–1.9)
BILIRUB SERPL-MCNC: 2.3 MG/DL (ref 0.1–1)
BUN SERPL-MCNC: 17 MG/DL (ref 8–23)
CALCIUM SERPL-MCNC: 9 MG/DL (ref 8.7–10.5)
CHLORIDE SERPL-SCNC: 107 MMOL/L (ref 95–110)
CO2 SERPL-SCNC: 24 MMOL/L (ref 23–29)
CREAT SERPL-MCNC: 1 MG/DL (ref 0.5–1.4)
DIFFERENTIAL METHOD: ABNORMAL
EOSINOPHIL # BLD AUTO: 0.2 K/UL (ref 0–0.5)
EOSINOPHIL NFR BLD: 3.7 % (ref 0–8)
ERYTHROCYTE [DISTWIDTH] IN BLOOD BY AUTOMATED COUNT: 15 % (ref 11.5–14.5)
EST. GFR  (AFRICAN AMERICAN): >60 ML/MIN/1.73 M^2
EST. GFR  (NON AFRICAN AMERICAN): >60 ML/MIN/1.73 M^2
GLUCOSE SERPL-MCNC: 142 MG/DL (ref 70–110)
HCT VFR BLD AUTO: 37.8 % (ref 40–54)
HGB BLD-MCNC: 12.6 G/DL (ref 14–18)
IMM GRANULOCYTES # BLD AUTO: 0.01 K/UL (ref 0–0.04)
IMM GRANULOCYTES NFR BLD AUTO: 0.2 % (ref 0–0.5)
INR PPP: 1.1 (ref 0.8–1.2)
LYMPHOCYTES # BLD AUTO: 0.6 K/UL (ref 1–4.8)
LYMPHOCYTES NFR BLD: 13.6 % (ref 18–48)
MCH RBC QN AUTO: 29.8 PG (ref 27–31)
MCHC RBC AUTO-ENTMCNC: 33.3 G/DL (ref 32–36)
MCV RBC AUTO: 89 FL (ref 82–98)
MONOCYTES # BLD AUTO: 0.4 K/UL (ref 0.3–1)
MONOCYTES NFR BLD: 9.5 % (ref 4–15)
NEUTROPHILS # BLD AUTO: 3.4 K/UL (ref 1.8–7.7)
NEUTROPHILS NFR BLD: 72.6 % (ref 38–73)
NRBC BLD-RTO: 0 /100 WBC
PLATELET # BLD AUTO: 136 K/UL (ref 150–450)
PMV BLD AUTO: 10.4 FL (ref 9.2–12.9)
POTASSIUM SERPL-SCNC: 3.8 MMOL/L (ref 3.5–5.1)
PROT SERPL-MCNC: 6.4 G/DL (ref 6–8.4)
PROTHROMBIN TIME: 11 SEC (ref 9–12.5)
RBC # BLD AUTO: 4.23 M/UL (ref 4.6–6.2)
SODIUM SERPL-SCNC: 138 MMOL/L (ref 136–145)
WBC # BLD AUTO: 4.62 K/UL (ref 3.9–12.7)

## 2021-11-10 PROCEDURE — 99900103 DSU ONLY-NO CHARGE-INITIAL HR (STAT): Performed by: UROLOGY

## 2021-11-10 PROCEDURE — 25000003 PHARM REV CODE 250: Performed by: NURSE ANESTHETIST, CERTIFIED REGISTERED

## 2021-11-10 PROCEDURE — 52356 PR CYSTO/URETERO W/LITHOTRIPSY: ICD-10-PCS | Mod: LT,,, | Performed by: UROLOGY

## 2021-11-10 PROCEDURE — 27201423 OPTIME MED/SURG SUP & DEVICES STERILE SUPPLY: Performed by: UROLOGY

## 2021-11-10 PROCEDURE — D9220A PRA ANESTHESIA: Mod: ANES,,, | Performed by: ANESTHESIOLOGY

## 2021-11-10 PROCEDURE — 71000015 HC POSTOP RECOV 1ST HR: Performed by: UROLOGY

## 2021-11-10 PROCEDURE — 82365 CALCULUS SPECTROSCOPY: CPT | Performed by: UROLOGY

## 2021-11-10 PROCEDURE — 85610 PROTHROMBIN TIME: CPT | Performed by: UROLOGY

## 2021-11-10 PROCEDURE — 71000033 HC RECOVERY, INTIAL HOUR: Performed by: UROLOGY

## 2021-11-10 PROCEDURE — C1769 GUIDE WIRE: HCPCS | Performed by: UROLOGY

## 2021-11-10 PROCEDURE — C2617 STENT, NON-COR, TEM W/O DEL: HCPCS | Performed by: UROLOGY

## 2021-11-10 PROCEDURE — 37000008 HC ANESTHESIA 1ST 15 MINUTES: Performed by: UROLOGY

## 2021-11-10 PROCEDURE — 85025 COMPLETE CBC W/AUTO DIFF WBC: CPT | Performed by: UROLOGY

## 2021-11-10 PROCEDURE — C1758 CATHETER, URETERAL: HCPCS | Performed by: UROLOGY

## 2021-11-10 PROCEDURE — 85730 THROMBOPLASTIN TIME PARTIAL: CPT | Performed by: UROLOGY

## 2021-11-10 PROCEDURE — D9220A PRA ANESTHESIA: ICD-10-PCS | Mod: CRNA,,, | Performed by: NURSE ANESTHETIST, CERTIFIED REGISTERED

## 2021-11-10 PROCEDURE — 63600175 PHARM REV CODE 636 W HCPCS: Performed by: NURSE ANESTHETIST, CERTIFIED REGISTERED

## 2021-11-10 PROCEDURE — 74420 UROGRAPHY RTRGR +-KUB: CPT | Mod: 26,,, | Performed by: UROLOGY

## 2021-11-10 PROCEDURE — 36415 COLL VENOUS BLD VENIPUNCTURE: CPT | Performed by: UROLOGY

## 2021-11-10 PROCEDURE — 80053 COMPREHEN METABOLIC PANEL: CPT | Performed by: UROLOGY

## 2021-11-10 PROCEDURE — 74420 PR  X-RAY RETROGRADE PYELOGRAM: ICD-10-PCS | Mod: 26,,, | Performed by: UROLOGY

## 2021-11-10 PROCEDURE — 99900104 DSU ONLY-NO CHARGE-EA ADD'L HR (STAT): Performed by: UROLOGY

## 2021-11-10 PROCEDURE — 71000039 HC RECOVERY, EACH ADD'L HOUR: Performed by: UROLOGY

## 2021-11-10 PROCEDURE — 36000706: Performed by: UROLOGY

## 2021-11-10 PROCEDURE — D9220A PRA ANESTHESIA: Mod: CRNA,,, | Performed by: NURSE ANESTHETIST, CERTIFIED REGISTERED

## 2021-11-10 PROCEDURE — D9220A PRA ANESTHESIA: ICD-10-PCS | Mod: ANES,,, | Performed by: ANESTHESIOLOGY

## 2021-11-10 PROCEDURE — 25000003 PHARM REV CODE 250: Performed by: ANESTHESIOLOGY

## 2021-11-10 PROCEDURE — 52356 CYSTO/URETERO W/LITHOTRIPSY: CPT | Mod: LT,,, | Performed by: UROLOGY

## 2021-11-10 PROCEDURE — 63600175 PHARM REV CODE 636 W HCPCS: Performed by: UROLOGY

## 2021-11-10 PROCEDURE — 36000707: Performed by: UROLOGY

## 2021-11-10 PROCEDURE — 37000009 HC ANESTHESIA EA ADD 15 MINS: Performed by: UROLOGY

## 2021-11-10 DEVICE — STENT SET URETERAL 6X24CM: Type: IMPLANTABLE DEVICE | Site: URETER | Status: FUNCTIONAL

## 2021-11-10 RX ORDER — DOXYCYCLINE 100 MG/1
100 CAPSULE ORAL 2 TIMES DAILY
Qty: 6 CAPSULE | Refills: 0 | Status: SHIPPED | OUTPATIENT
Start: 2021-11-10 | End: 2021-11-13

## 2021-11-10 RX ORDER — PHENYLEPHRINE HYDROCHLORIDE 10 MG/ML
INJECTION INTRAVENOUS
Status: DISCONTINUED | OUTPATIENT
Start: 2021-11-10 | End: 2021-11-10

## 2021-11-10 RX ORDER — OXYCODONE HYDROCHLORIDE 5 MG/1
5 TABLET ORAL
Status: DISCONTINUED | OUTPATIENT
Start: 2021-11-10 | End: 2021-11-10 | Stop reason: HOSPADM

## 2021-11-10 RX ORDER — FENTANYL CITRATE 50 UG/ML
25 INJECTION, SOLUTION INTRAMUSCULAR; INTRAVENOUS EVERY 5 MIN PRN
Status: DISCONTINUED | OUTPATIENT
Start: 2021-11-10 | End: 2021-11-10 | Stop reason: HOSPADM

## 2021-11-10 RX ORDER — TAMSULOSIN HYDROCHLORIDE 0.4 MG/1
0.4 CAPSULE ORAL
Qty: 30 CAPSULE | Refills: 0 | Status: SHIPPED | OUTPATIENT
Start: 2021-11-10 | End: 2022-02-08

## 2021-11-10 RX ORDER — LIDOCAINE HYDROCHLORIDE 20 MG/ML
INJECTION INTRAVENOUS
Status: DISCONTINUED | OUTPATIENT
Start: 2021-11-10 | End: 2021-11-10

## 2021-11-10 RX ORDER — SODIUM CHLORIDE 0.9 % (FLUSH) 0.9 %
3 SYRINGE (ML) INJECTION EVERY 8 HOURS
Status: DISCONTINUED | OUTPATIENT
Start: 2021-11-10 | End: 2021-11-10 | Stop reason: HOSPADM

## 2021-11-10 RX ORDER — DEXAMETHASONE SODIUM PHOSPHATE 4 MG/ML
INJECTION, SOLUTION INTRA-ARTICULAR; INTRALESIONAL; INTRAMUSCULAR; INTRAVENOUS; SOFT TISSUE
Status: DISCONTINUED | OUTPATIENT
Start: 2021-11-10 | End: 2021-11-10

## 2021-11-10 RX ORDER — SODIUM CHLORIDE 0.9 % (FLUSH) 0.9 %
3 SYRINGE (ML) INJECTION
Status: DISCONTINUED | OUTPATIENT
Start: 2021-11-10 | End: 2021-11-10 | Stop reason: HOSPADM

## 2021-11-10 RX ORDER — HYDROMORPHONE HYDROCHLORIDE 2 MG/ML
0.2 INJECTION, SOLUTION INTRAMUSCULAR; INTRAVENOUS; SUBCUTANEOUS EVERY 5 MIN PRN
Status: DISCONTINUED | OUTPATIENT
Start: 2021-11-10 | End: 2021-11-10 | Stop reason: HOSPADM

## 2021-11-10 RX ORDER — HYDROCODONE BITARTRATE AND ACETAMINOPHEN 5; 325 MG/1; MG/1
1 TABLET ORAL EVERY 6 HOURS PRN
Qty: 15 TABLET | Refills: 0 | Status: SHIPPED | OUTPATIENT
Start: 2021-11-10 | End: 2021-11-20

## 2021-11-10 RX ORDER — PROPOFOL 10 MG/ML
VIAL (ML) INTRAVENOUS
Status: DISCONTINUED | OUTPATIENT
Start: 2021-11-10 | End: 2021-11-10

## 2021-11-10 RX ORDER — ONDANSETRON 2 MG/ML
INJECTION INTRAMUSCULAR; INTRAVENOUS
Status: DISCONTINUED | OUTPATIENT
Start: 2021-11-10 | End: 2021-11-10

## 2021-11-10 RX ORDER — MEPERIDINE HYDROCHLORIDE 50 MG/ML
12.5 INJECTION INTRAMUSCULAR; INTRAVENOUS; SUBCUTANEOUS ONCE AS NEEDED
Status: DISCONTINUED | OUTPATIENT
Start: 2021-11-10 | End: 2021-11-10 | Stop reason: HOSPADM

## 2021-11-10 RX ORDER — ONDANSETRON 2 MG/ML
4 INJECTION INTRAMUSCULAR; INTRAVENOUS DAILY PRN
Status: DISCONTINUED | OUTPATIENT
Start: 2021-11-10 | End: 2021-11-10 | Stop reason: HOSPADM

## 2021-11-10 RX ORDER — LIDOCAINE HYDROCHLORIDE 10 MG/ML
1 INJECTION, SOLUTION EPIDURAL; INFILTRATION; INTRACAUDAL; PERINEURAL ONCE
Status: DISCONTINUED | OUTPATIENT
Start: 2021-11-10 | End: 2021-11-10 | Stop reason: HOSPADM

## 2021-11-10 RX ORDER — DIPHENHYDRAMINE HYDROCHLORIDE 50 MG/ML
25 INJECTION INTRAMUSCULAR; INTRAVENOUS EVERY 6 HOURS PRN
Status: DISCONTINUED | OUTPATIENT
Start: 2021-11-10 | End: 2021-11-10 | Stop reason: HOSPADM

## 2021-11-10 RX ORDER — ROCURONIUM BROMIDE 10 MG/ML
INJECTION, SOLUTION INTRAVENOUS
Status: DISCONTINUED | OUTPATIENT
Start: 2021-11-10 | End: 2021-11-10

## 2021-11-10 RX ORDER — MIDAZOLAM HYDROCHLORIDE 1 MG/ML
INJECTION, SOLUTION INTRAMUSCULAR; INTRAVENOUS
Status: DISCONTINUED | OUTPATIENT
Start: 2021-11-10 | End: 2021-11-10

## 2021-11-10 RX ORDER — FENTANYL CITRATE 50 UG/ML
INJECTION, SOLUTION INTRAMUSCULAR; INTRAVENOUS
Status: DISCONTINUED | OUTPATIENT
Start: 2021-11-10 | End: 2021-11-10

## 2021-11-10 RX ORDER — ACETAMINOPHEN 10 MG/ML
INJECTION, SOLUTION INTRAVENOUS
Status: DISCONTINUED | OUTPATIENT
Start: 2021-11-10 | End: 2021-11-10

## 2021-11-10 RX ORDER — SUCCINYLCHOLINE CHLORIDE 20 MG/ML
INJECTION INTRAMUSCULAR; INTRAVENOUS
Status: DISCONTINUED | OUTPATIENT
Start: 2021-11-10 | End: 2021-11-10

## 2021-11-10 RX ORDER — HYDROCODONE BITARTRATE AND ACETAMINOPHEN 5; 325 MG/1; MG/1
1 TABLET ORAL EVERY 6 HOURS PRN
Qty: 15 TABLET | Refills: 0 | Status: SHIPPED | OUTPATIENT
Start: 2021-11-10 | End: 2021-11-10 | Stop reason: SDUPTHER

## 2021-11-10 RX ADMIN — SUCCINYLCHOLINE CHLORIDE 180 MG: 20 INJECTION, SOLUTION INTRAMUSCULAR; INTRAVENOUS; PARENTERAL at 09:11

## 2021-11-10 RX ADMIN — ROCURONIUM BROMIDE 10 MG: 10 INJECTION, SOLUTION INTRAVENOUS at 09:11

## 2021-11-10 RX ADMIN — LIDOCAINE HYDROCHLORIDE 100 MG: 20 INJECTION, SOLUTION INTRAVENOUS at 09:11

## 2021-11-10 RX ADMIN — ONDANSETRON 4 MG: 2 INJECTION, SOLUTION INTRAMUSCULAR; INTRAVENOUS at 09:11

## 2021-11-10 RX ADMIN — SODIUM CHLORIDE, SODIUM GLUCONATE, SODIUM ACETATE, POTASSIUM CHLORIDE, MAGNESIUM CHLORIDE, SODIUM PHOSPHATE, DIBASIC, AND POTASSIUM PHOSPHATE: .53; .5; .37; .037; .03; .012; .00082 INJECTION, SOLUTION INTRAVENOUS at 08:11

## 2021-11-10 RX ADMIN — DEXAMETHASONE SODIUM PHOSPHATE 4 MG: 4 INJECTION, SOLUTION INTRA-ARTICULAR; INTRALESIONAL; INTRAMUSCULAR; INTRAVENOUS; SOFT TISSUE at 09:11

## 2021-11-10 RX ADMIN — FENTANYL CITRATE 50 MCG: 50 INJECTION, SOLUTION INTRAMUSCULAR; INTRAVENOUS at 09:11

## 2021-11-10 RX ADMIN — PROPOFOL 150 MG: 10 INJECTION, EMULSION INTRAVENOUS at 09:11

## 2021-11-10 RX ADMIN — PHENYLEPHRINE HYDROCHLORIDE 100 MCG: 10 INJECTION INTRAVENOUS at 09:11

## 2021-11-10 RX ADMIN — CEFTRIAXONE 1 G: 1 INJECTION, SOLUTION INTRAVENOUS at 09:11

## 2021-11-10 RX ADMIN — MIDAZOLAM 2 MG: 1 INJECTION INTRAMUSCULAR; INTRAVENOUS at 09:11

## 2021-11-10 RX ADMIN — ACETAMINOPHEN 1000 MG: 10 INJECTION, SOLUTION INTRAVENOUS at 10:11

## 2021-11-10 NOTE — TELEPHONE ENCOUNTER
----- Message from Delmy Hernandez MD sent at 11/10/2021 11:12 AM CST -----  Schedule xray and ultrasound  prior to his f/u in feb (feb f/u alredy shceduled)    Make sure he also has a psa diagnostic prior to his f/u in feb

## 2021-11-10 NOTE — TELEPHONE ENCOUNTER
Patient scheduled for KUB and Ultrasound on Feb 3 for 9:45am. Patient does have PSA diagnostic scheduled. Will print on AVS once discharged

## 2021-11-12 ENCOUNTER — TELEPHONE (OUTPATIENT)
Dept: UROLOGY | Facility: CLINIC | Age: 66
End: 2021-11-12
Payer: MEDICARE

## 2021-11-16 LAB
COMPN STONE: NORMAL
SPECIMEN SOURCE: NORMAL
STONE ANALYSIS IR-IMP: NORMAL

## 2021-12-08 ENCOUNTER — TELEPHONE (OUTPATIENT)
Dept: FAMILY MEDICINE | Facility: CLINIC | Age: 66
End: 2021-12-08
Payer: MEDICARE

## 2021-12-08 DIAGNOSIS — M62.838 MUSCLE SPASM: Primary | ICD-10-CM

## 2021-12-08 RX ORDER — METHOCARBAMOL 750 MG/1
750 TABLET, FILM COATED ORAL 3 TIMES DAILY PRN
Qty: 30 TABLET | Refills: 0 | Status: SHIPPED | OUTPATIENT
Start: 2021-12-08 | End: 2021-12-18

## 2021-12-14 LAB
25(OH)D3 SERPL-MCNC: 40 NG/ML (ref 30–100)
ALBUMIN SERPL-MCNC: 4.2 G/DL (ref 3.6–5.1)
ALBUMIN/GLOB SERPL: 1.9 (CALC) (ref 1–2.5)
ALP SERPL-CCNC: 68 U/L (ref 35–144)
ALT SERPL-CCNC: 12 U/L (ref 9–46)
AST SERPL-CCNC: 13 U/L (ref 10–35)
BASOPHILS # BLD AUTO: 32 CELLS/UL (ref 0–200)
BASOPHILS NFR BLD AUTO: 0.7 %
BILIRUB SERPL-MCNC: 1.6 MG/DL (ref 0.2–1.2)
BUN SERPL-MCNC: 21 MG/DL (ref 7–25)
BUN/CREAT SERPL: ABNORMAL (CALC) (ref 6–22)
CALCIUM SERPL-MCNC: 9.3 MG/DL (ref 8.6–10.3)
CHLORIDE SERPL-SCNC: 105 MMOL/L (ref 98–110)
CHOLEST SERPL-MCNC: 122 MG/DL
CHOLEST/HDLC SERPL: 3.5 (CALC)
CO2 SERPL-SCNC: 27 MMOL/L (ref 20–32)
CREAT SERPL-MCNC: 1.04 MG/DL (ref 0.7–1.25)
EOSINOPHIL # BLD AUTO: 171 CELLS/UL (ref 15–500)
EOSINOPHIL NFR BLD AUTO: 3.8 %
ERYTHROCYTE [DISTWIDTH] IN BLOOD BY AUTOMATED COUNT: 14.5 % (ref 11–15)
GLOBULIN SER CALC-MCNC: 2.2 G/DL (CALC) (ref 1.9–3.7)
GLUCOSE SERPL-MCNC: 120 MG/DL (ref 65–99)
HBA1C MFR BLD: 5.7 % OF TOTAL HGB
HCT VFR BLD AUTO: 39.7 % (ref 38.5–50)
HDLC SERPL-MCNC: 35 MG/DL
HGB BLD-MCNC: 13.2 G/DL (ref 13.2–17.1)
LDLC SERPL CALC-MCNC: 70 MG/DL (CALC)
LYMPHOCYTES # BLD AUTO: 752 CELLS/UL (ref 850–3900)
LYMPHOCYTES NFR BLD AUTO: 16.7 %
MCH RBC QN AUTO: 29.2 PG (ref 27–33)
MCHC RBC AUTO-ENTMCNC: 33.2 G/DL (ref 32–36)
MCV RBC AUTO: 87.8 FL (ref 80–100)
MONOCYTES # BLD AUTO: 374 CELLS/UL (ref 200–950)
MONOCYTES NFR BLD AUTO: 8.3 %
NEUTROPHILS # BLD AUTO: 3173 CELLS/UL (ref 1500–7800)
NEUTROPHILS NFR BLD AUTO: 70.5 %
NONHDLC SERPL-MCNC: 87 MG/DL (CALC)
PLATELET # BLD AUTO: 142 THOUSAND/UL (ref 140–400)
PMV BLD REES-ECKER: 10.9 FL (ref 7.5–12.5)
POTASSIUM SERPL-SCNC: 4 MMOL/L (ref 3.5–5.3)
PROT SERPL-MCNC: 6.4 G/DL (ref 6.1–8.1)
RBC # BLD AUTO: 4.52 MILLION/UL (ref 4.2–5.8)
SODIUM SERPL-SCNC: 140 MMOL/L (ref 135–146)
T4 FREE SERPL-MCNC: 1.1 NG/DL (ref 0.8–1.8)
TRIGL SERPL-MCNC: 88 MG/DL
TSH SERPL-ACNC: 3.2 MIU/L (ref 0.4–4.5)
WBC # BLD AUTO: 4.5 THOUSAND/UL (ref 3.8–10.8)

## 2021-12-15 ENCOUNTER — OFFICE VISIT (OUTPATIENT)
Dept: FAMILY MEDICINE | Facility: CLINIC | Age: 66
End: 2021-12-15
Payer: MEDICARE

## 2021-12-15 VITALS
HEIGHT: 68 IN | DIASTOLIC BLOOD PRESSURE: 60 MMHG | WEIGHT: 301 LBS | OXYGEN SATURATION: 97 % | SYSTOLIC BLOOD PRESSURE: 100 MMHG | TEMPERATURE: 99 F | BODY MASS INDEX: 45.62 KG/M2 | HEART RATE: 80 BPM

## 2021-12-15 DIAGNOSIS — I10 ESSENTIAL (PRIMARY) HYPERTENSION: ICD-10-CM

## 2021-12-15 DIAGNOSIS — R73.01 IMPAIRED FASTING GLUCOSE: ICD-10-CM

## 2021-12-15 DIAGNOSIS — E66.01 CLASS 3 SEVERE OBESITY DUE TO EXCESS CALORIES WITH SERIOUS COMORBIDITY AND BODY MASS INDEX (BMI) OF 45.0 TO 49.9 IN ADULT: ICD-10-CM

## 2021-12-15 DIAGNOSIS — E55.9 VITAMIN D INSUFFICIENCY: ICD-10-CM

## 2021-12-15 DIAGNOSIS — Z23 NEED FOR PNEUMOCOCCAL VACCINATION: ICD-10-CM

## 2021-12-15 DIAGNOSIS — I48.0 PAROXYSMAL ATRIAL FIBRILLATION: ICD-10-CM

## 2021-12-15 DIAGNOSIS — E03.9 ACQUIRED HYPOTHYROIDISM: Primary | ICD-10-CM

## 2021-12-15 DIAGNOSIS — E78.5 DYSLIPIDEMIA: ICD-10-CM

## 2021-12-15 PROCEDURE — 90732 PNEUMOCOCCAL POLYSACCHARIDE VACCINE 23-VALENT =>2YO SQ IM: ICD-10-PCS | Mod: S$GLB,,, | Performed by: NURSE PRACTITIONER

## 2021-12-15 PROCEDURE — 99214 OFFICE O/P EST MOD 30 MIN: CPT | Mod: 25,S$GLB,, | Performed by: NURSE PRACTITIONER

## 2021-12-15 PROCEDURE — 4010F PR ACE/ARB THEARPY RXD/TAKEN: ICD-10-PCS | Mod: S$GLB,,, | Performed by: NURSE PRACTITIONER

## 2021-12-15 PROCEDURE — 99214 PR OFFICE/OUTPT VISIT, EST, LEVL IV, 30-39 MIN: ICD-10-PCS | Mod: 25,S$GLB,, | Performed by: NURSE PRACTITIONER

## 2021-12-15 PROCEDURE — G0009 ADMIN PNEUMOCOCCAL VACCINE: HCPCS | Mod: S$GLB,,, | Performed by: NURSE PRACTITIONER

## 2021-12-15 PROCEDURE — 4010F ACE/ARB THERAPY RXD/TAKEN: CPT | Mod: S$GLB,,, | Performed by: NURSE PRACTITIONER

## 2021-12-15 PROCEDURE — 90732 PPSV23 VACC 2 YRS+ SUBQ/IM: CPT | Mod: S$GLB,,, | Performed by: NURSE PRACTITIONER

## 2021-12-15 PROCEDURE — G0009 PNEUMOCOCCAL POLYSACCHARIDE VACCINE 23-VALENT =>2YO SQ IM: ICD-10-PCS | Mod: S$GLB,,, | Performed by: NURSE PRACTITIONER

## 2022-01-25 ENCOUNTER — LAB VISIT (OUTPATIENT)
Dept: LAB | Facility: HOSPITAL | Age: 67
End: 2022-01-25
Attending: UROLOGY
Payer: MEDICARE

## 2022-01-25 DIAGNOSIS — C61 MALIGNANT NEOPLASM OF PROSTATE: ICD-10-CM

## 2022-01-25 DIAGNOSIS — N20.0 NEPHROLITHIASIS: ICD-10-CM

## 2022-01-25 LAB — COMPLEXED PSA SERPL-MCNC: 0.04 NG/ML (ref 0–4)

## 2022-01-25 PROCEDURE — 36415 COLL VENOUS BLD VENIPUNCTURE: CPT | Performed by: UROLOGY

## 2022-01-25 PROCEDURE — 84153 ASSAY OF PSA TOTAL: CPT | Performed by: UROLOGY

## 2022-02-03 ENCOUNTER — HOSPITAL ENCOUNTER (OUTPATIENT)
Dept: RADIOLOGY | Facility: HOSPITAL | Age: 67
Discharge: HOME | End: 2022-02-03
Attending: UROLOGY
Payer: MEDICARE

## 2022-02-03 DIAGNOSIS — N20.0 NEPHROLITHIASIS: ICD-10-CM

## 2022-02-03 PROCEDURE — 76770 US EXAM ABDO BACK WALL COMP: CPT | Mod: 26,,, | Performed by: RADIOLOGY

## 2022-02-03 PROCEDURE — 76770 US RETROPERITONEAL COMPLETE: ICD-10-PCS | Mod: 26,,, | Performed by: RADIOLOGY

## 2022-02-03 PROCEDURE — 74018 RADEX ABDOMEN 1 VIEW: CPT | Mod: TC,FY

## 2022-02-03 PROCEDURE — 74018 RADEX ABDOMEN 1 VIEW: CPT | Mod: 26,,, | Performed by: RADIOLOGY

## 2022-02-03 PROCEDURE — 74018 XR ABDOMEN AP 1 VIEW: ICD-10-PCS | Mod: 26,,, | Performed by: RADIOLOGY

## 2022-02-03 PROCEDURE — 76770 US EXAM ABDO BACK WALL COMP: CPT | Mod: TC

## 2022-02-08 ENCOUNTER — OFFICE VISIT (OUTPATIENT)
Dept: UROLOGY | Facility: CLINIC | Age: 67
End: 2022-02-08
Payer: MEDICARE

## 2022-02-08 VITALS
HEART RATE: 100 BPM | HEIGHT: 68 IN | DIASTOLIC BLOOD PRESSURE: 64 MMHG | WEIGHT: 308.31 LBS | SYSTOLIC BLOOD PRESSURE: 115 MMHG | RESPIRATION RATE: 18 BRPM | BODY MASS INDEX: 46.73 KG/M2

## 2022-02-08 DIAGNOSIS — N20.0 NEPHROLITHIASIS: ICD-10-CM

## 2022-02-08 DIAGNOSIS — N32.0 BLADDER NECK CONTRACTURE: ICD-10-CM

## 2022-02-08 DIAGNOSIS — C61 PROSTATE CANCER: Primary | ICD-10-CM

## 2022-02-08 PROCEDURE — 1159F MED LIST DOCD IN RCRD: CPT | Mod: CPTII,S$GLB,, | Performed by: UROLOGY

## 2022-02-08 PROCEDURE — 1101F PT FALLS ASSESS-DOCD LE1/YR: CPT | Mod: CPTII,S$GLB,, | Performed by: UROLOGY

## 2022-02-08 PROCEDURE — 99214 PR OFFICE/OUTPT VISIT, EST, LEVL IV, 30-39 MIN: ICD-10-PCS | Mod: S$GLB,,, | Performed by: UROLOGY

## 2022-02-08 PROCEDURE — 1159F PR MEDICATION LIST DOCUMENTED IN MEDICAL RECORD: ICD-10-PCS | Mod: CPTII,S$GLB,, | Performed by: UROLOGY

## 2022-02-08 PROCEDURE — 3008F PR BODY MASS INDEX (BMI) DOCUMENTED: ICD-10-PCS | Mod: CPTII,S$GLB,, | Performed by: UROLOGY

## 2022-02-08 PROCEDURE — 3074F PR MOST RECENT SYSTOLIC BLOOD PRESSURE < 130 MM HG: ICD-10-PCS | Mod: CPTII,S$GLB,, | Performed by: UROLOGY

## 2022-02-08 PROCEDURE — 1160F RVW MEDS BY RX/DR IN RCRD: CPT | Mod: CPTII,S$GLB,, | Performed by: UROLOGY

## 2022-02-08 PROCEDURE — 3078F DIAST BP <80 MM HG: CPT | Mod: CPTII,S$GLB,, | Performed by: UROLOGY

## 2022-02-08 PROCEDURE — 3074F SYST BP LT 130 MM HG: CPT | Mod: CPTII,S$GLB,, | Performed by: UROLOGY

## 2022-02-08 PROCEDURE — 3008F BODY MASS INDEX DOCD: CPT | Mod: CPTII,S$GLB,, | Performed by: UROLOGY

## 2022-02-08 PROCEDURE — 99999 PR PBB SHADOW E&M-EST. PATIENT-LVL III: CPT | Mod: PBBFAC,,, | Performed by: UROLOGY

## 2022-02-08 PROCEDURE — 3288F FALL RISK ASSESSMENT DOCD: CPT | Mod: CPTII,S$GLB,, | Performed by: UROLOGY

## 2022-02-08 PROCEDURE — 99214 OFFICE O/P EST MOD 30 MIN: CPT | Mod: S$GLB,,, | Performed by: UROLOGY

## 2022-02-08 PROCEDURE — 1160F PR REVIEW ALL MEDS BY PRESCRIBER/CLIN PHARMACIST DOCUMENTED: ICD-10-PCS | Mod: CPTII,S$GLB,, | Performed by: UROLOGY

## 2022-02-08 PROCEDURE — 1126F AMNT PAIN NOTED NONE PRSNT: CPT | Mod: CPTII,S$GLB,, | Performed by: UROLOGY

## 2022-02-08 PROCEDURE — 99999 PR PBB SHADOW E&M-EST. PATIENT-LVL III: ICD-10-PCS | Mod: PBBFAC,,, | Performed by: UROLOGY

## 2022-02-08 PROCEDURE — 3078F PR MOST RECENT DIASTOLIC BLOOD PRESSURE < 80 MM HG: ICD-10-PCS | Mod: CPTII,S$GLB,, | Performed by: UROLOGY

## 2022-02-08 PROCEDURE — 1126F PR PAIN SEVERITY QUANTIFIED, NO PAIN PRESENT: ICD-10-PCS | Mod: CPTII,S$GLB,, | Performed by: UROLOGY

## 2022-02-08 PROCEDURE — 3288F PR FALLS RISK ASSESSMENT DOCUMENTED: ICD-10-PCS | Mod: CPTII,S$GLB,, | Performed by: UROLOGY

## 2022-02-08 PROCEDURE — 1101F PR PT FALLS ASSESS DOC 0-1 FALLS W/OUT INJ PAST YR: ICD-10-PCS | Mod: CPTII,S$GLB,, | Performed by: UROLOGY

## 2022-02-08 NOTE — PROGRESS NOTES
[facility] H&P Note - [place]  Staff: MD Mary     Referring provider and please cc:   PCP: Dr[facility]cel[facility]     Chief Complaint: prostate cancer     Subjective:        HPI: Marcus Parisi is a 64 y.o. male presents with  Prostate cancer, T2c Gl3+3, T2c psa 2.9, NxMx prostate nodules, B apex and MH.  · He was initially seen by me in 2018 for rising psa.   · He was found to have Gl 3+3 in 4/14 cores Jan 2019 and was referred due to insurance issues to .   ·  took him for a RALP on 7/1/20. Gl 4+3 with teriary 5 in <5%. Tumor involves 20 to 25% of prostate. Right apical and Left posterior margins involved by cancer.  · Also had a h/o stones 10+ years ago. Had a stent but leaked continuously   · 9/11/21 psa at [facility] <0.1  · 10/27/21: Saw francoise on 10/27/21 and was c/o gross hematuria. CTU showed mid to distal L ureteral 3mm stone. Pt wanted it removed.     Interval history 11/3/21  He is here in preop and says he hasn't had any hematuria in 3 to 4 days. He's had no flank pain in 3 weeks. He never received flomax ([facility]). He also has not passed stone.  Prostate cancer- still has some incontinence  He was discharged on flomax with a plan for a repeat ctrss     Interval history 11/10/21:  ctrss 11/8/21 showed L ureteral 4mm stone in same position with moderate hydro, no left renal stones. 2, 2mm stones in right kidney. Long bladder . Therefore here today for cystoscopy and ureteroscopy to extract stone as long as no infection beyond stone   psa is 0.03 (no previous super sensitive psa's to go by)  Still with LUIS ENRIQUE    Interval history 2/8/22:  · 11/10/21 cysto L urs and stone extraction. Stone was not radioopaque. Inflammation around stone. Found to have BNC. Left stent on strings. Stones mostly calcium oxalate.   · 1/25/22 psa up to 0.04  · rbus 2/3/22: 8mm stone on L wo hydro. No stone on right  · kub 2/3/22: no stones seen     Hasn't  "passed any stones. Didn't notice much diffference after procedure  (no increased flow) and denies any slow flow. Denies any frequency or ugency. Still has LUIS ENRIQUE.     psa history   22  pvr by scan: 0  22 0.04  21 0.03  21 <0.1  20 RALP Gl 4+3 with teriary 5 in <5%. Tumor involves 20 to 25% of prostate.   19 psa 2.9, trus vol: 40.5g, T2c (b palp nodules), Gl 3+3 in RBM, LML, LONDONO, LTZ ().  HGPIN in  RBL, RBM (). ASAP in [facility] ()  19          2.9, %free 15.5, JAHAIRA: 20g, definite nodule on left apex and one on right apex.   10/9/18            JAHAIRA: 20g gland without any discrete masses, left side however more firm, no tenderness.  18            2.1  2017             1.2      Urine history:   22 neg  11/10/21 90%calcium oxalate/10% calcium phosphate  10/27/21 Ng, void: red, 3+bld, >100 rbc, cyt: neg.   21 coreen  19 Ng, void: tr wbc- no sx of uti  19 ng, void: neg  1/15/19 No cx, void: tr blood, cytology: negative  10/9/18            No cx, void: neg       REVIEW OF SYSTEMS:  General ROS: no fevers, no chills  Psychological ROS: no depression  Endocrine ROS: no heat or cold  Respiratory ROS: no SOB  Cardiovascular ROS: no CP  Gastrointestinal ROS: no abdominal pain, no constipation, non diarrhea, on BRBPR  Musculoskeletal ROS: no muscle pain  Neurological ROS: kapil headaches  Dermatological ROS: no rashes  HEENT: no glasses, no sinus   ROS: per HPI       Fam Hx:   malignancies: No  . Gyn malignancies: no. Mother had "bone cancer" and  2 months after diagnosis a 72. Father  a 84 from natural causes.    kidney stones: No      Soc Hx:  Former tobacco, quit 40+years ago.  3 pk per day x 10 year  occ alcohol  Lives in [place]  :yes here with wife Ann-Marie   Children: 2   Occupation:      Allergies:  Patient has no known allergies.     Medications: reviewed   Anticoagulation: Yes - asa 81mg      Objective:      Vitals:    22 1601 "   BP: 115/64   Pulse: 100   Resp: 18             Assessment:         Marcus Parisi is a 67 y.o. male with     1. Prostate cancer    2. Nephrolithiasis    3. Bladder neck contracture       H/o Prostate cancer, Gl 4+3 with teriary 5 in <5% s/p ralp in 7/2020 with + margins with now slowly rising psa. psa slowly rising- with h/o some Gl 5 in biopsy could have had a more aggressive cancer. Continue to monitor closely. If psa increases may need mri prostate to evaluate if any local recurrence at the site and if so see if he is candidate for salvage radiation. Could add ADT if psa continues to rise >0.5.     b renal stones s/p L stone extraction. Still has 2 small stones on right. Stones calcium oxalate. Ultrasound can over and underestimate size of stones. That was his second stone. Can hold off on a stone workup.     Bladder neck contracture s/p dilation. Says he has no problems urinating. Want baseline uroflow.        Plan:       psa diagnostic in 3 months and f/u after (standing orders in).   But if psa rising, will order prostate mri to eval for any local recurrence at sites of positive margins.     Uroflow and pvr on nurse visit soon  kub and rbus in a year (order at f/u)    F/u in 3 months with diagnostic psa prior     Delmy Hernandez MD

## 2022-02-08 NOTE — PATIENT INSTRUCTIONS
H/o Prostate cancer, Gl 4+3 with teriary 5 in <5% s/p ralp in 7/2020 with + margins with now slowly rising psa. psa slowly rising- with h/o some Gl 5 in biopsy could have had a more aggressive cancer. Continue to monitor closely. If psa increases may need mri prostate to evaluate if any local recurrence at the site and if so see if he is candidate for salvage radiation. Could add ADT if psa continues to rise >0.5.     b renal stones s/p L stone extraction. Still has 2 small stones on right. Stones calcium oxalate. Ultrasound can over and underestimate size of stones. That was his second stone. Can hold off on a stone workup.     Bladder neck contracture s/p dilation. Says he has no problems urinating. Want baseline uroflow.        Plan:       psa diagnostic in 3 months and f/u after   But if psa rising, will order prostate mri to eval for any local recurrence at sites of positive margins.     Uroflow and pvr on nurse visit soon  kub and rbus in a year (order at f/u)    F/u in 3 months with diagnostic psa prior

## 2022-02-16 ENCOUNTER — CLINICAL SUPPORT (OUTPATIENT)
Dept: UROLOGY | Facility: CLINIC | Age: 67
End: 2022-02-16
Payer: MEDICARE

## 2022-02-16 DIAGNOSIS — C61 PROSTATE CANCER: Primary | ICD-10-CM

## 2022-02-16 PROCEDURE — 51741 PR UROFLOWMETRY, COMPLEX: ICD-10-PCS | Mod: S$GLB,,, | Performed by: UROLOGY

## 2022-02-16 PROCEDURE — 99499 UNLISTED E&M SERVICE: CPT | Mod: S$GLB,,, | Performed by: UROLOGY

## 2022-02-16 PROCEDURE — 99499 NO LOS: ICD-10-PCS | Mod: S$GLB,,, | Performed by: UROLOGY

## 2022-02-16 PROCEDURE — 51741 ELECTRO-UROFLOWMETRY FIRST: CPT | Mod: S$GLB,,, | Performed by: UROLOGY

## 2022-02-16 NOTE — PROGRESS NOTES
Per  patient arrived in clinic today for uroflow and pvr.    Uroflow results (date: 02/16/2022): Voiding time: 21.1s, Flow time: 17.2s, TTP flow: 9.3s, Peak flowrate: 24.1 mL/s, Average flowrate: 13.5mL/s, Intervals: 2, Voided volume: 233 mL, Pvr by bladder scan: 0mL . Pattern of curve: see uploaded image, reviewed by   Follow up scheduled for: 5/24/2022  On flomax 0.4mg nightly: no  On flomax 0.8mg nightly: no

## 2022-03-03 NOTE — PROGRESS NOTES
Per  patient arrived in clinic today for uroflow and pvr.    Uroflow results (date: 02/16/2022): Voiding time: 21.1s, Flow time: 17.2s, TTP flow: 9.3s, Peak flowrate: 24.1 mL/s, Average flowrate: 13.5mL/s, Intervals: 2, Voided volume: 233 mL, Pvr by bladder scan: 0mL . Pattern of curve: see uploaded image, reviewed by   Follow up scheduled for: 5/24/2022  On flomax 0.4mg nightly: no  On flomax 0.8mg nightly: no      Uroflow results reviewed and interpreted by me ()  The curve shows the curve is: bell  Qmax is: 24.1  The patient does not appear to be obstructed    Voided volumes (V void ) of 125-150 mL are necessary to ensure accurate, reproducible curves    Normal flow  There is great variation in uroflowmetry parameters even in the non?symptomatic population 5, although flow curves are generally repeatable for the same patient. In particular, there are no definitive normal ranges for Qmax, although it decreases with age and voided volume (but not in a directly proportional manner). Males aged <40?years usually have a Qmax of >25?mL/s, and females usually have a Qmax of 5-10?mL/s more than males at a given bladder volume. Beware the normal flow that in fact represents the effect of a compensatory increase in the voiding pressure generated by the detrusor in patients with ELIZONDO.    Decreased flow  This is the most common abnormal flow trace seen in practice and is represented by a dampened curve with decreased Qmax and prolonged flow time. A significantly decreased Qmax (generally accepted as <15?mL/s) cannot be used to distinguish between ELIZONDO in men, outflow obstruction in women, and impaired detrusor contractility 6; in appropriate cases, formal multichannel urodynamic studies with concomitant measurements of flow and detrusor pressures are important to delineate between these conditions.    Despite the limitations, Qmax remains the single best non?invasive urodynamic test to  detect possible lower urinary tract obstruction. The test is also useful in some clinical situations to guide further evaluation to predict outcome after surgery and for preoperative counselling:   Males patients with a Qmax above the threshold value of 15?mL/s (or 12?mL/s 7) may have a poorer outcome after prostate surgery for presumed ELIZONDO 8 and these men should be considered for formal urodynamics to arrive at a definite diagnosis and decrease treatment failures.   Femalespatients undergoing mid?urethral sling surgery with a Qmax of <15?mL/s at preoperative uroflowmetry are more likely to fail a trial of void after sling surgery 9.    Plateau flow  A long flow time, associated with a poor flow is typical of a stricture in the lower urinary tract. Another commonly encountered scenario is the patient with post?radical prostatectomy incontinence. One should suspect an anastomotic stricture if this flow curve pattern is seen in the office during initial postoperative assessment. The patient should be considered for a cystoscopy with a view to treat the stricture as the next step in management, rather than referral for a formal urodynamic study as difficult catheterisation is commonly encountered.    Intermittent flow  This may be seen in patients who void with some abdominal straining due to ELIZONDO or a poorly contractile detrusor, and is often superimposed on a decreased or plateauing curve pattern.    Saw?tooth flow  Often pathogneumonic of detrusor?sphincter?dyssynergia, this curve should prompt urgent pressure?flow studies to investigate high intravesical pressures that might damage the upper tracts.    Super?voider  This is seen after surgery for ELIZONDO (e.g. TURP or urethroplasty), in patients with decreased urethral resistance (e.g. intrinsic urethral sphincter deficiency), or occasionally in those with detrusor overactivity. It may be considered normal if there are no symptoms or signs to suggest underlying  pathology, and is sometimes seen in young healthy female patients who may have a Qmax exceeding 40?mL/s!    Kicking the bucket, and other artefacts  Urologists must be wary of artefacts and always compare the automated printout reading with the curve and clinical context. Smooth muscle physiology suggests that there should not be any abrupt spikes on a trace. A patient who accidentally kicks the flowmeter can appear to have a normal Qmax. Other artefacts created by abdominal straining, squeezing the prepuce, or even variations in the direction of the urinary stream (within the funnel of the uroflowmeter) are common and urologists must recognise these.

## 2022-05-17 ENCOUNTER — LAB VISIT (OUTPATIENT)
Dept: LAB | Facility: HOSPITAL | Age: 67
End: 2022-05-17
Attending: UROLOGY
Payer: MEDICARE

## 2022-05-17 DIAGNOSIS — C61 MALIGNANT NEOPLASM OF PROSTATE: ICD-10-CM

## 2022-05-17 DIAGNOSIS — N20.0 NEPHROLITHIASIS: ICD-10-CM

## 2022-05-17 LAB — COMPLEXED PSA SERPL-MCNC: 0.04 NG/ML (ref 0–4)

## 2022-05-17 PROCEDURE — 36415 COLL VENOUS BLD VENIPUNCTURE: CPT | Performed by: UROLOGY

## 2022-05-17 PROCEDURE — 84153 ASSAY OF PSA TOTAL: CPT | Performed by: UROLOGY

## 2022-05-24 ENCOUNTER — OFFICE VISIT (OUTPATIENT)
Dept: UROLOGY | Facility: CLINIC | Age: 67
End: 2022-05-24
Payer: MEDICARE

## 2022-05-24 VITALS
DIASTOLIC BLOOD PRESSURE: 73 MMHG | WEIGHT: 307.13 LBS | SYSTOLIC BLOOD PRESSURE: 129 MMHG | BODY MASS INDEX: 46.55 KG/M2 | HEIGHT: 68 IN | RESPIRATION RATE: 18 BRPM | HEART RATE: 90 BPM

## 2022-05-24 DIAGNOSIS — N20.0 NEPHROLITHIASIS: ICD-10-CM

## 2022-05-24 DIAGNOSIS — N32.0 BLADDER NECK CONTRACTURE: ICD-10-CM

## 2022-05-24 DIAGNOSIS — C61 PROSTATE CANCER: Primary | ICD-10-CM

## 2022-05-24 DIAGNOSIS — N52.1 ERECTILE DYSFUNCTION DUE TO DISEASES CLASSIFIED ELSEWHERE: ICD-10-CM

## 2022-05-24 LAB
BILIRUB SERPL-MCNC: ABNORMAL MG/DL
BLOOD URINE, POC: ABNORMAL
CLARITY, POC UA: CLEAR
COLOR, POC UA: YELLOW
GLUCOSE UR QL STRIP: ABNORMAL
KETONES UR QL STRIP: ABNORMAL
LEUKOCYTE ESTERASE URINE, POC: ABNORMAL
NITRITE, POC UA: ABNORMAL
PH, POC UA: 7.5
POC RESIDUAL URINE VOLUME: 11 ML (ref 0–100)
PROTEIN, POC: ABNORMAL
SPECIFIC GRAVITY, POC UA: 1.01
UROBILINOGEN, POC UA: 1

## 2022-05-24 PROCEDURE — 99999 PR PBB SHADOW E&M-EST. PATIENT-LVL III: CPT | Mod: PBBFAC,,, | Performed by: UROLOGY

## 2022-05-24 PROCEDURE — 3074F SYST BP LT 130 MM HG: CPT | Mod: CPTII,S$GLB,, | Performed by: UROLOGY

## 2022-05-24 PROCEDURE — 4010F PR ACE/ARB THEARPY RXD/TAKEN: ICD-10-PCS | Mod: CPTII,S$GLB,, | Performed by: UROLOGY

## 2022-05-24 PROCEDURE — 99999 PR PBB SHADOW E&M-EST. PATIENT-LVL III: ICD-10-PCS | Mod: PBBFAC,,, | Performed by: UROLOGY

## 2022-05-24 PROCEDURE — 99213 OFFICE O/P EST LOW 20 MIN: CPT | Mod: S$GLB,,, | Performed by: UROLOGY

## 2022-05-24 PROCEDURE — 51798 US URINE CAPACITY MEASURE: CPT | Mod: S$GLB,,, | Performed by: UROLOGY

## 2022-05-24 PROCEDURE — 3008F PR BODY MASS INDEX (BMI) DOCUMENTED: ICD-10-PCS | Mod: CPTII,S$GLB,, | Performed by: UROLOGY

## 2022-05-24 PROCEDURE — 1126F AMNT PAIN NOTED NONE PRSNT: CPT | Mod: CPTII,S$GLB,, | Performed by: UROLOGY

## 2022-05-24 PROCEDURE — 1101F PT FALLS ASSESS-DOCD LE1/YR: CPT | Mod: CPTII,S$GLB,, | Performed by: UROLOGY

## 2022-05-24 PROCEDURE — 81002 POCT URINE DIPSTICK WITHOUT MICROSCOPE: ICD-10-PCS | Mod: S$GLB,,, | Performed by: UROLOGY

## 2022-05-24 PROCEDURE — 1159F MED LIST DOCD IN RCRD: CPT | Mod: CPTII,S$GLB,, | Performed by: UROLOGY

## 2022-05-24 PROCEDURE — 4010F ACE/ARB THERAPY RXD/TAKEN: CPT | Mod: CPTII,S$GLB,, | Performed by: UROLOGY

## 2022-05-24 PROCEDURE — 3074F PR MOST RECENT SYSTOLIC BLOOD PRESSURE < 130 MM HG: ICD-10-PCS | Mod: CPTII,S$GLB,, | Performed by: UROLOGY

## 2022-05-24 PROCEDURE — 99213 PR OFFICE/OUTPT VISIT, EST, LEVL III, 20-29 MIN: ICD-10-PCS | Mod: S$GLB,,, | Performed by: UROLOGY

## 2022-05-24 PROCEDURE — 51798 POCT BLADDER SCAN: ICD-10-PCS | Mod: S$GLB,,, | Performed by: UROLOGY

## 2022-05-24 PROCEDURE — 3078F PR MOST RECENT DIASTOLIC BLOOD PRESSURE < 80 MM HG: ICD-10-PCS | Mod: CPTII,S$GLB,, | Performed by: UROLOGY

## 2022-05-24 PROCEDURE — 3288F FALL RISK ASSESSMENT DOCD: CPT | Mod: CPTII,S$GLB,, | Performed by: UROLOGY

## 2022-05-24 PROCEDURE — 1160F RVW MEDS BY RX/DR IN RCRD: CPT | Mod: CPTII,S$GLB,, | Performed by: UROLOGY

## 2022-05-24 PROCEDURE — 3008F BODY MASS INDEX DOCD: CPT | Mod: CPTII,S$GLB,, | Performed by: UROLOGY

## 2022-05-24 PROCEDURE — 1101F PR PT FALLS ASSESS DOC 0-1 FALLS W/OUT INJ PAST YR: ICD-10-PCS | Mod: CPTII,S$GLB,, | Performed by: UROLOGY

## 2022-05-24 PROCEDURE — 81002 URINALYSIS NONAUTO W/O SCOPE: CPT | Mod: S$GLB,,, | Performed by: UROLOGY

## 2022-05-24 PROCEDURE — 3288F PR FALLS RISK ASSESSMENT DOCUMENTED: ICD-10-PCS | Mod: CPTII,S$GLB,, | Performed by: UROLOGY

## 2022-05-24 PROCEDURE — 1160F PR REVIEW ALL MEDS BY PRESCRIBER/CLIN PHARMACIST DOCUMENTED: ICD-10-PCS | Mod: CPTII,S$GLB,, | Performed by: UROLOGY

## 2022-05-24 PROCEDURE — 1159F PR MEDICATION LIST DOCUMENTED IN MEDICAL RECORD: ICD-10-PCS | Mod: CPTII,S$GLB,, | Performed by: UROLOGY

## 2022-05-24 PROCEDURE — 1126F PR PAIN SEVERITY QUANTIFIED, NO PAIN PRESENT: ICD-10-PCS | Mod: CPTII,S$GLB,, | Performed by: UROLOGY

## 2022-05-24 PROCEDURE — 3078F DIAST BP <80 MM HG: CPT | Mod: CPTII,S$GLB,, | Performed by: UROLOGY

## 2022-05-24 RX ORDER — TADALAFIL 20 MG/1
20 TABLET ORAL
Qty: 30 TABLET | Refills: 5 | Status: SHIPPED | OUTPATIENT
Start: 2022-05-24 | End: 2023-01-10 | Stop reason: SDUPTHER

## 2022-05-24 NOTE — PATIENT INSTRUCTIONS
1. Prostate cancer    2. Erectile dysfunction due to diseases classified elsewhere    3. Nephrolithiasis    4. Bladder neck contracture       H/o Prostate cancer, Gl 4+3 with teriary 5 in <5% s/p ralp in 7/2020 with + margins with now slowly rising psa. psa slowly rising- with h/o some Gl 5 in biopsy could have had a more aggressive cancer. Continue to monitor closely. If psa increases may need mri prostate to evaluate if any local recurrence at the site and if so see if he is candidate for salvage radiation. Could add ADT if psa continues to rise >0.5.     b renal stones s/p L stone extraction. Still has 2 small stones on right. Stones calcium oxalate. Ultrasound can over and underestimate size of stones. That was his second stone. Can hold off on a stone workup.     Bladder neck contracture s/p dilation. Says he has no problems urinating.baseline uroflow showed great flow. Peak flow 24.        Plan:       psa diagnostic in 6 months and f/u after (standing orders in).   But if psa rising, will order prostate mri to eval for any local recurrence at sites of positive margins.     kub and rbus in feb 2023, order at f/u.    cialis as needed (20mg). Not daily. LAURA if he's interested in the future. Will give info in the future.    F/u in 6 months with diagnostic psa prior

## 2022-05-24 NOTE — PROGRESS NOTES
[facility] H&P Note - [place]  Staff: MD Mary     Referring provider and please cc:   PCP: Dr[facility]celveen [facility]active     Chief Complaint: prostate cancer     Subjective:        HPI: Marcus Parisi is a 64 y.o. male presents with  Prostate cancer, T2c Gl3+3, T2c psa 2.9, NxMx prostate nodules, B apex and MH.  · He was initially seen by me in 2018 for rising psa.   · He was found to have Gl 3+3 in 4/14 cores Jan 2019 and was referred due to insurance issues to .   ·  took him for a RALP on 7/1/20. Gl 4+3 with teriary 5 in <5%. Tumor involves 20 to 25% of prostate. Right apical and Left posterior margins involved by cancer.  · Also had a h/o stones 10+ years ago. Had a stent but leaked continuously   · 9/11/21 psa at [facility] <0.1  · 10/27/21: Saw francoise on 10/27/21 and was c/o gross hematuria. CTU showed mid to distal L ureteral 3mm stone. Pt wanted it removed.     Interval history 11/3/21  He is here in preop and says he hasn't had any hematuria in 3 to 4 days. He's had no flank pain in 3 weeks. He never received [facility] ([facility] He also has not passed stone.  Prostate cancer- still has some incontinence  He was discharged on flomax with a plan for a repeat ctrss     Interval history 11/10/21:  ctrss 11/8/21 showed L ureteral 4mm stone in same position with moderate hydro, no left renal stones. 2, 2mm stones in right kidney. Long bladder . Therefore here today for cystoscopy and ureteroscopy to extract stone as long as no infection beyond stone   psa is 0.03 (no previous super sensitive psa's to go by)  Still with LUIS ENRIQUE    Interval history 2/8/22:  · 11/10/21 cysto L urs and stone extraction. Stone was not radioopaque. Inflammation around stone. Found to have BNC. Left stent on strings. Stones mostly calcium oxalate.   · 1/25/22 psa up to 0.04  · rbus 2/3/22: 8mm stone on L wo hydro. No stone on right  · kub 2/3/22: no stones seen     Hasn't  passed any stones. Didn't notice much diffference after procedure  (no increased flow) and denies any slow flow. Denies any frequency or ugency. Still has LUIS ENRIQUE.     Interval history since last visit with me:  Uroflow results (date: 02/16/2022): Voiding time: 21.1s, Flow time: 17.2s, TTP flow: 9.3s, Peak flowrate: 24.1 mL/s, Average flowrate: 13.5mL/s, Intervals: 2, Voided volume: 233 mL, Pvr by bladder scan: 0mL . Pattern of curve: see uploaded image, reviewed by     5/17/122 0.04    HPI/CC today 5/24/22:   He apparently is using cialis 20mg daily. Should not be doing this daily. Not interested in LAURA. Not a good candidate for inection. On eliquis.     psa history   5/24/22  pvr by scan: 11  5/17/22 0.04   2/8/22  pvr by scan: 0  1/25/22 0.04  11/8/21 0.03  9/11/21 <0.1   7/31/20 RALP Gl 4+3 with teriary 5 in <5%. Tumor involves 20 to 25% of prostate.   1/28/19 psa 2.9, trus vol: 40.5g, T2c (b palp nodules), Gl 3+3 in RBM, LML, LONDONO, LTZ (4/14).  HGPIN in  RBL, RBM (2/14). ASAP in [facility] (1/14)  01/14/19          2.9, %free 15.5, JAHAIRA: 20g, definite nodule on left apex and one on right apex.   10/9/18            JAHAIRA: 20g gland without any discrete masses, left side however more firm, no tenderness.  9/18/18            2.1  9/2017             1.2      Urine history:   5/24/22 Tr bld  02/8/22 neg  11/10/21 90%calcium oxalate/10% calcium phosphate  10/27/21 Ng, void: red, 3+bld, >100 rbc, cyt: neg.   6/19/21 coreen  1/28/19 Ng, void: tr wbc- no sx of uti  1/21/19 ng, void: neg  1/15/19 No cx, void: tr blood, cytology: negative  10/9/18            No cx, void: neg       REVIEW OF SYSTEMS:  General ROS: no fevers, no chills  Psychological ROS: no depression  Endocrine ROS: no heat or cold  Respiratory ROS: no SOB  Cardiovascular ROS: no CP  Gastrointestinal ROS: no abdominal pain, no constipation, non diarrhea, on BRBPR  Musculoskeletal ROS: no muscle pain  Neurological ROS: kapil headaches  Dermatological ROS: no  "rashes  HEENT: no glasses, no sinus   ROS: per HPI       Fam Hx:   malignancies: No  . Gyn malignancies: no. Mother had "bone cancer" and  2 months after diagnosis a 72. Father  a 84 from natural causes.    kidney stones: No      Soc Hx:  Former tobacco, quit 40+years ago.  3 pk per day x 10 year  occ alcohol  Lives in [place]  :yes here with wife Ann-Marie   Children: 2   Occupation:      Allergies:  Patient has no known allergies.     Medications: reviewed   Anticoagulation: Yes - asa 81mg      Objective:      Vitals:    22 1548   BP: 129/73   Pulse: 90   Resp: 18             Assessment:         Marcus Parisi is a 67 y.o. male with     1. Prostate cancer    2. Erectile dysfunction due to diseases classified elsewhere    3. Nephrolithiasis    4. Bladder neck contracture       H/o Prostate cancer, Gl 4+3 with teriary 5 in <5% s/p ralp in 2020 with + margins with now slowly rising psa. psa slowly rising- with h/o some Gl 5 in biopsy could have had a more aggressive cancer. Continue to monitor closely. If psa increases may need mri prostate to evaluate if any local recurrence at the site and if so see if he is candidate for salvage radiation. Could add ADT if psa continues to rise >0.5.     b renal stones s/p L stone extraction. Still has 2 small stones on right. Stones calcium oxalate. Ultrasound can over and underestimate size of stones. That was his second stone. Can hold off on a stone workup.     Bladder neck contracture s/p dilation. Says he has no problems urinating.baseline uroflow showed great flow. Peak flow 24.        Plan:       psa diagnostic in 6 months and f/u after (standing orders in).   But if psa rising, will order prostate mri to eval for any local recurrence at sites of positive margins.     kub and rbus in 2023, order at f/u.    cialis as needed (20mg). Not daily. LAURA if he's interested in the future. Will give info in the future.    F/u in 6 months with " diagnostic psa prior      Delmy Hernandez MD

## 2022-06-09 ENCOUNTER — PATIENT MESSAGE (OUTPATIENT)
Dept: FAMILY MEDICINE | Facility: CLINIC | Age: 67
End: 2022-06-09

## 2022-06-14 LAB
25(OH)D3 SERPL-MCNC: 43 NG/ML (ref 30–100)
ALBUMIN SERPL-MCNC: 4.2 G/DL (ref 3.6–5.1)
ALBUMIN/GLOB SERPL: 2.1 (CALC) (ref 1–2.5)
ALP SERPL-CCNC: 82 U/L (ref 35–144)
ALT SERPL-CCNC: 12 U/L (ref 9–46)
APPEARANCE UR: ABNORMAL
AST SERPL-CCNC: 11 U/L (ref 10–35)
BASOPHILS # BLD AUTO: 41 CELLS/UL (ref 0–200)
BASOPHILS NFR BLD AUTO: 0.8 %
BILIRUB SERPL-MCNC: 1.3 MG/DL (ref 0.2–1.2)
BILIRUB UR QL STRIP: NEGATIVE
BUN SERPL-MCNC: 18 MG/DL (ref 7–25)
BUN/CREAT SERPL: ABNORMAL (CALC) (ref 6–22)
CALCIUM SERPL-MCNC: 9.1 MG/DL (ref 8.6–10.3)
CHLORIDE SERPL-SCNC: 105 MMOL/L (ref 98–110)
CHOLEST SERPL-MCNC: 107 MG/DL
CHOLEST/HDLC SERPL: 3.8 (CALC)
CO2 SERPL-SCNC: 28 MMOL/L (ref 20–32)
COLOR UR: ABNORMAL
CREAT SERPL-MCNC: 0.97 MG/DL (ref 0.7–1.25)
EOSINOPHIL # BLD AUTO: 219 CELLS/UL (ref 15–500)
EOSINOPHIL NFR BLD AUTO: 4.3 %
ERYTHROCYTE [DISTWIDTH] IN BLOOD BY AUTOMATED COUNT: 14.4 % (ref 11–15)
GLOBULIN SER CALC-MCNC: 2 G/DL (CALC) (ref 1.9–3.7)
GLUCOSE SERPL-MCNC: 139 MG/DL (ref 65–99)
GLUCOSE UR QL STRIP: NEGATIVE
HBA1C MFR BLD: 5.9 % OF TOTAL HGB
HCT VFR BLD AUTO: 44.8 % (ref 38.5–50)
HDLC SERPL-MCNC: 28 MG/DL
HGB BLD-MCNC: 14.8 G/DL (ref 13.2–17.1)
HGB UR QL STRIP: ABNORMAL
KETONES UR QL STRIP: ABNORMAL
LDLC SERPL CALC-MCNC: 61 MG/DL (CALC)
LEUKOCYTE ESTERASE UR QL STRIP: NEGATIVE
LYMPHOCYTES # BLD AUTO: 836 CELLS/UL (ref 850–3900)
LYMPHOCYTES NFR BLD AUTO: 16.4 %
MCH RBC QN AUTO: 29.5 PG (ref 27–33)
MCHC RBC AUTO-ENTMCNC: 33 G/DL (ref 32–36)
MCV RBC AUTO: 89.4 FL (ref 80–100)
MONOCYTES # BLD AUTO: 561 CELLS/UL (ref 200–950)
MONOCYTES NFR BLD AUTO: 11 %
NEUTROPHILS # BLD AUTO: 3443 CELLS/UL (ref 1500–7800)
NEUTROPHILS NFR BLD AUTO: 67.5 %
NITRITE UR QL STRIP: NEGATIVE
NONHDLC SERPL-MCNC: 79 MG/DL (CALC)
PH UR STRIP: ABNORMAL [PH] (ref 5–8)
PLATELET # BLD AUTO: 154 THOUSAND/UL (ref 140–400)
PMV BLD REES-ECKER: 11 FL (ref 7.5–12.5)
POTASSIUM SERPL-SCNC: 4.3 MMOL/L (ref 3.5–5.3)
PROT SERPL-MCNC: 6.2 G/DL (ref 6.1–8.1)
PROT UR QL STRIP: NEGATIVE
RBC # BLD AUTO: 5.01 MILLION/UL (ref 4.2–5.8)
SODIUM SERPL-SCNC: 140 MMOL/L (ref 135–146)
SP GR UR STRIP: 1.03 (ref 1–1.03)
T4 FREE SERPL-MCNC: 1.1 NG/DL (ref 0.8–1.8)
TRIGL SERPL-MCNC: 97 MG/DL
TSH SERPL-ACNC: 2.91 MIU/L (ref 0.4–4.5)
WBC # BLD AUTO: 5.1 THOUSAND/UL (ref 3.8–10.8)

## 2022-06-15 ENCOUNTER — OFFICE VISIT (OUTPATIENT)
Dept: FAMILY MEDICINE | Facility: CLINIC | Age: 67
End: 2022-06-15
Payer: MEDICARE

## 2022-06-15 VITALS
OXYGEN SATURATION: 96 % | WEIGHT: 310 LBS | DIASTOLIC BLOOD PRESSURE: 60 MMHG | SYSTOLIC BLOOD PRESSURE: 110 MMHG | HEART RATE: 81 BPM | BODY MASS INDEX: 46.98 KG/M2 | HEIGHT: 68 IN | TEMPERATURE: 98 F

## 2022-06-15 DIAGNOSIS — E78.5 DYSLIPIDEMIA: ICD-10-CM

## 2022-06-15 DIAGNOSIS — I10 ESSENTIAL (PRIMARY) HYPERTENSION: ICD-10-CM

## 2022-06-15 DIAGNOSIS — E03.9 ACQUIRED HYPOTHYROIDISM: Primary | ICD-10-CM

## 2022-06-15 DIAGNOSIS — E55.9 VITAMIN D INSUFFICIENCY: ICD-10-CM

## 2022-06-15 DIAGNOSIS — E66.01 CLASS 3 SEVERE OBESITY DUE TO EXCESS CALORIES WITH SERIOUS COMORBIDITY AND BODY MASS INDEX (BMI) OF 45.0 TO 49.9 IN ADULT: ICD-10-CM

## 2022-06-15 DIAGNOSIS — R73.01 IMPAIRED FASTING GLUCOSE: ICD-10-CM

## 2022-06-15 DIAGNOSIS — I48.0 PAROXYSMAL ATRIAL FIBRILLATION: ICD-10-CM

## 2022-06-15 PROCEDURE — 1126F PR PAIN SEVERITY QUANTIFIED, NO PAIN PRESENT: ICD-10-PCS | Mod: CPTII,S$GLB,, | Performed by: NURSE PRACTITIONER

## 2022-06-15 PROCEDURE — 1160F RVW MEDS BY RX/DR IN RCRD: CPT | Mod: CPTII,S$GLB,, | Performed by: NURSE PRACTITIONER

## 2022-06-15 PROCEDURE — 3074F SYST BP LT 130 MM HG: CPT | Mod: CPTII,S$GLB,, | Performed by: NURSE PRACTITIONER

## 2022-06-15 PROCEDURE — 3074F PR MOST RECENT SYSTOLIC BLOOD PRESSURE < 130 MM HG: ICD-10-PCS | Mod: CPTII,S$GLB,, | Performed by: NURSE PRACTITIONER

## 2022-06-15 PROCEDURE — 4010F ACE/ARB THERAPY RXD/TAKEN: CPT | Mod: CPTII,S$GLB,, | Performed by: NURSE PRACTITIONER

## 2022-06-15 PROCEDURE — 1101F PT FALLS ASSESS-DOCD LE1/YR: CPT | Mod: CPTII,S$GLB,, | Performed by: NURSE PRACTITIONER

## 2022-06-15 PROCEDURE — 1159F PR MEDICATION LIST DOCUMENTED IN MEDICAL RECORD: ICD-10-PCS | Mod: CPTII,S$GLB,, | Performed by: NURSE PRACTITIONER

## 2022-06-15 PROCEDURE — 1159F MED LIST DOCD IN RCRD: CPT | Mod: CPTII,S$GLB,, | Performed by: NURSE PRACTITIONER

## 2022-06-15 PROCEDURE — 3008F PR BODY MASS INDEX (BMI) DOCUMENTED: ICD-10-PCS | Mod: CPTII,S$GLB,, | Performed by: NURSE PRACTITIONER

## 2022-06-15 PROCEDURE — 3288F PR FALLS RISK ASSESSMENT DOCUMENTED: ICD-10-PCS | Mod: CPTII,S$GLB,, | Performed by: NURSE PRACTITIONER

## 2022-06-15 PROCEDURE — 3044F PR MOST RECENT HEMOGLOBIN A1C LEVEL <7.0%: ICD-10-PCS | Mod: CPTII,S$GLB,, | Performed by: NURSE PRACTITIONER

## 2022-06-15 PROCEDURE — 3078F PR MOST RECENT DIASTOLIC BLOOD PRESSURE < 80 MM HG: ICD-10-PCS | Mod: CPTII,S$GLB,, | Performed by: NURSE PRACTITIONER

## 2022-06-15 PROCEDURE — 3078F DIAST BP <80 MM HG: CPT | Mod: CPTII,S$GLB,, | Performed by: NURSE PRACTITIONER

## 2022-06-15 PROCEDURE — 3044F HG A1C LEVEL LT 7.0%: CPT | Mod: CPTII,S$GLB,, | Performed by: NURSE PRACTITIONER

## 2022-06-15 PROCEDURE — 4010F PR ACE/ARB THEARPY RXD/TAKEN: ICD-10-PCS | Mod: CPTII,S$GLB,, | Performed by: NURSE PRACTITIONER

## 2022-06-15 PROCEDURE — 1160F PR REVIEW ALL MEDS BY PRESCRIBER/CLIN PHARMACIST DOCUMENTED: ICD-10-PCS | Mod: CPTII,S$GLB,, | Performed by: NURSE PRACTITIONER

## 2022-06-15 PROCEDURE — 99214 OFFICE O/P EST MOD 30 MIN: CPT | Mod: S$GLB,,, | Performed by: NURSE PRACTITIONER

## 2022-06-15 PROCEDURE — 3008F BODY MASS INDEX DOCD: CPT | Mod: CPTII,S$GLB,, | Performed by: NURSE PRACTITIONER

## 2022-06-15 PROCEDURE — 1101F PR PT FALLS ASSESS DOC 0-1 FALLS W/OUT INJ PAST YR: ICD-10-PCS | Mod: CPTII,S$GLB,, | Performed by: NURSE PRACTITIONER

## 2022-06-15 PROCEDURE — 99214 PR OFFICE/OUTPT VISIT, EST, LEVL IV, 30-39 MIN: ICD-10-PCS | Mod: S$GLB,,, | Performed by: NURSE PRACTITIONER

## 2022-06-15 PROCEDURE — 1126F AMNT PAIN NOTED NONE PRSNT: CPT | Mod: CPTII,S$GLB,, | Performed by: NURSE PRACTITIONER

## 2022-06-15 PROCEDURE — 3288F FALL RISK ASSESSMENT DOCD: CPT | Mod: CPTII,S$GLB,, | Performed by: NURSE PRACTITIONER

## 2022-06-15 NOTE — PROGRESS NOTES
Subjective:       Patient ID: Marcus Parisi is a 67 y.o. male.    Chief Complaint: Hypertension and Follow-up    Patient presents today for six-month follow-up for chronic conditions including hypertension, hyperlipidemia, thyroid problem, and obesity.  Current medications have been well tolerated and chronic conditions are stable.  Labs have been received and reviewed and within acceptable range.  Patient will be continued on his current dose medications.  Patient is obese with a BMI of 47.14    Hypertension  This is a chronic problem. The current episode started more than 1 month ago. The problem has been waxing and waning since onset. The problem is controlled. Pertinent negatives include no anxiety, blurred vision, orthopnea, palpitations, peripheral edema, PND, shortness of breath or sweats. There are no associated agents to hypertension. Risk factors for coronary artery disease include stress, obesity, dyslipidemia and male gender. Past treatments include calcium channel blockers and ACE inhibitors. The current treatment provides significant improvement. Compliance problems include exercise and diet.  There is no history of angina or kidney disease. Identifiable causes of hypertension include a thyroid problem. There is no history of chronic renal disease.   Thyroid Problem  Presents for follow-up visit. Patient reports no anxiety, cold intolerance, constipation, depressed mood, diarrhea, heat intolerance, palpitations, weight gain or weight loss. The symptoms have been worsening. His past medical history is significant for hyperlipidemia.   Hyperlipidemia  This is a recurrent problem. The current episode started more than 1 month ago. The problem is controlled. Recent lipid tests were reviewed and are variable. Exacerbating diseases include hypothyroidism and obesity. He has no history of chronic renal disease. Factors aggravating his hyperlipidemia include fatty foods. Pertinent negatives include no focal  weakness, leg pain or shortness of breath. Current antihyperlipidemic treatment includes statins. The current treatment provides moderate improvement of lipids. Compliance problems include adherence to exercise and adherence to diet.  Risk factors for coronary artery disease include stress, dyslipidemia, male sex, hypertension and obesity.   Erectile Dysfunction  This is a chronic problem. The current episode started more than 1 year ago. The problem has been waxing and waning since onset. The nature of his difficulty is achieving erection and maintaining erection. He reports no anxiety or decreased libido. He reports his erection duration to be 1 to 5 minutes. Irritative symptoms do not include urgency. Pertinent negatives include no dysuria or hematuria. The symptoms are aggravated by stress. Past treatments include tadalafil. The treatment provided moderate relief. He has been using treatment for 1 to 2 years. He has had no adverse reactions caused by medications. Risk factors include hypertension.     Review of Systems   Constitutional: Negative for activity change, appetite change, unexpected weight change, weight gain and weight loss.        Obesity   HENT: Negative for ear discharge, ear pain, hearing loss, postnasal drip, rhinorrhea, sinus pain, trouble swallowing and voice change.         Ear fullness, decreased hearing   Eyes: Negative for blurred vision, photophobia, pain, discharge and visual disturbance.   Respiratory: Negative for chest tightness, shortness of breath and wheezing.    Cardiovascular: Negative for palpitations, orthopnea, leg swelling and PND.        Hypertension, hyperlipidemia, afib   Gastrointestinal: Negative for blood in stool, constipation and diarrhea.   Endocrine: Negative for cold intolerance, heat intolerance, polydipsia and polyuria.        Hypothyroid   Genitourinary: Negative for decreased libido, difficulty urinating, dysuria, flank pain, hematuria and urgency.         Prostate cancer history, ED   Musculoskeletal: Negative for gait problem.   Allergic/Immunologic: Negative for immunocompromised state.   Neurological: Negative for dizziness and focal weakness.   Hematological: Negative for adenopathy.   Psychiatric/Behavioral: Negative for agitation, confusion, dysphoric mood, self-injury and suicidal ideas. The patient is not nervous/anxious.        Past Medical History:   Diagnosis Date    A-fib     Allergy     Anticoagulant long-term use     Arthritis     Asthma     as a child    Hyperlipidemia     Hypertension     Prostate cancer 2019    Sleep apnea     Use CPAP    Sleep apnea     uses C-PAP      Past Surgical History:   Procedure Laterality Date    CARPAL TUNNEL RELEASE Bilateral     CYSTOSCOPY N/A 1/28/2019    Procedure: CYSTOSCOPY;  Surgeon: Delmy Hernandez MD;  [facility]: [facility];  Service: Urology;  Laterality: N/A;    CYSTOURETEROSCOPY WITH RETROGRADE PYELOGRAPHY AND INSERTION OF STENT INTO URETER Left 11/10/2021    Procedure: CYSTOURETEROSCOPY, WITH RETROGRADE PYELOGRAM AND URETERAL STENT INSERTION;  Surgeon: Delmy Hernandez MD;  Location: [facility] OR;  Service: Urology;  Laterality: Left;  please make 1st patient    INSERTION OF IMPLANTABLE LOOP RECORDER N/A 4/23/2021    Procedure: Insertion, Implantable Loop Recorder;  Surgeon: Almaz Rivas MD;  Location: [facility] CATH;  Service: Cardiology;  Laterality: N/A;    ROBOT-ASSISTED LAPAROSCOPIC PROSTATECTOMY USING DA LAVERN XI N/A 7/30/2020    Procedure: XI ROBOTIC PROSTATECTOMY;  Surgeon: Mejia Gold MD;  Location: [facility] OR;  Service: Urology;  Laterality: N/A;    TONSILLECTOMY      TRANSRECTAL BIOPSY OF PROSTATE WITH ULTRASOUND GUIDANCE N/A 1/28/2019    Procedure: BIOPSY, PROSTATE, RECTAL APPROACH, WITH US GUIDANCE;  Surgeon: Delmy Hernandez MD;  Location: [facility] OR;  Service: Urology;  Laterality: N/A;       Family History   Problem Relation Age of Onset    Cancer Mother      Asthma Mother     Diabetes Mother     Heart disease Mother     Hypertension Mother     Hypertension Father     Crohn's disease Brother     Cancer Maternal Grandmother        Social History     Socioeconomic History    Marital status:    Tobacco Use    Smoking status: Former Smoker     Packs/day: 3.00     Years: 5.00     Pack years: 15.00     Types: Cigarettes     Quit date:      Years since quittin.4    Smokeless tobacco: Former User     Types: Chew    Tobacco comment: quit 40 yrs ago   Substance and Sexual Activity    Alcohol use: Not Currently     Comment: occasional    Drug use: No    Sexual activity: Yes     Partners: Female       Current Outpatient Medications   Medication Sig Dispense Refill    amlodipine-benazepril 5-20 mg (LOTREL) 5-20 mg per capsule Take 1 capsule by mouth once daily 90 capsule 1    atorvastatin (LIPITOR) 20 MG tablet TAKE 1 TABLET BY MOUTH ONCE DAILY IN THE EVENING 90 tablet 1    cyanocobalamin (VITAMIN B-12) 1000 MCG tablet Take 1,000 mcg by mouth once daily.       ELIQUIS 5 mg Tab Take 1 tablet by mouth twice daily 180 tablet 1    OMEGA-3 ACID ETHYL ESTERS ORAL Take 1 capsule by mouth once daily.       sotaloL (BETAPACE) 120 MG Tab TAKE 1 TABLET BY MOUTH EVERY 12 HOURS 180 tablet 1    tadalafiL (CIALIS) 20 MG Tab Take 1 tablet (20 mg total) by mouth as needed (as needed prior to intercourse). 30 tablet 5    azelastine (ASTELIN) 137 mcg (0.1 %) nasal spray 1 spray (137 mcg total) by Nasal route 2 (two) times daily. 30 mL 2    levothyroxine (SYNTHROID) 50 MCG tablet TAKE 1 TABLET BEFORE BREAKFAST (Patient not taking: Reported on 6/15/2022) 90 tablet 3     No current facility-administered medications for this visit.     Facility-Administered Medications Ordered in Other Visits   Medication Dose Route Frequency Provider Last Rate Last Admin    electrolyte-S (ISOLYTE)   Intravenous Continuous Marcus Belcher MD 10 mL/hr at 21 1315 New Bag at  "11/03/21 1315    LIDOcaine (PF) 10 mg/ml (1%) injection 5 mg  0.5 mL Intradermal Once Marcus Belcher MD           Review of patient's allergies indicates:  No Known Allergies  Objective:      Blood pressure 110/60, pulse 81, temperature 98.1 °F (36.7 °C), height 5' 8" (1.727 m), weight (!) 140.6 kg (310 lb), SpO2 96 %. Body mass index is 47.14 kg/m².   Physical Exam  Vitals and nursing note reviewed.   Constitutional:       General: He is not in acute distress.     Appearance: Normal appearance. He is well-developed. He is obese. He is not ill-appearing.      Comments: Obesity   HENT:      Head: Normocephalic and atraumatic.      Right Ear: External ear normal. No middle ear effusion.      Left Ear: External ear normal.  No middle ear effusion.      Nose: Nose normal.      Right Turbinates: Not swollen.      Left Turbinates: Not swollen.      Mouth/Throat:      Lips: Pink.      Mouth: Mucous membranes are moist.      Pharynx: Uvula midline. No oropharyngeal exudate.   Eyes:      General: Lids are normal.      Extraocular Movements: Extraocular movements intact.      Conjunctiva/sclera: Conjunctivae normal.      Pupils: Pupils are equal, round, and reactive to light.   Cardiovascular:      Rate and Rhythm: Normal rate and regular rhythm.      Pulses: Normal pulses.      Heart sounds: Normal heart sounds, S1 normal and S2 normal. No murmur heard.  Pulmonary:      Effort: Pulmonary effort is normal. No respiratory distress.      Breath sounds: Normal breath sounds and air entry. No stridor. No decreased breath sounds, wheezing or rales.   Abdominal:      General: Bowel sounds are normal.      Palpations: Abdomen is soft.      Tenderness: There is no abdominal tenderness.   Musculoskeletal:         General: Normal range of motion.      Cervical back: Normal range of motion and neck supple.   Lymphadenopathy:      Cervical: No cervical adenopathy.   Skin:     General: Skin is warm and dry.      Findings: No rash. "   Neurological:      General: No focal deficit present.      Mental Status: He is alert and oriented to person, place, and time. Mental status is at baseline.   Psychiatric:         Mood and Affect: Mood normal.         Speech: Speech normal.         Behavior: Behavior normal.         Thought Content: Thought content normal.         Judgment: Judgment normal.             Assessment:       1. Acquired hypothyroidism    2. Essential (primary) hypertension    3. Dyslipidemia    4. Impaired fasting glucose    5. Paroxysmal atrial fibrillation    6. Vitamin D insufficiency    7. Class 3 severe obesity due to excess calories with serious comorbidity and body mass index (BMI) of 45.0 to 49.9 in adult        Plan:       Marcus was seen today for hypertension and follow-up.    Diagnoses and all orders for this visit:    Acquired hypothyroidism  -     T4, Free; Future  -     TSH; Future  -     T4, Free  -     TSH    Stable.  Continue current medications    Essential (primary) hypertension  -     CBC Auto Differential; Future  -     Comprehensive Metabolic Panel; Future  -     Urinalysis; Future  -     CBC Auto Differential  -     Comprehensive Metabolic Panel  -     Urinalysis    Lifestyle changes: Reduce the amount of salt in your diet; Lose weight; Avoid drinking too much alcohol; Exercise at least 30 minutes per day most days of the week.  Continue current medications and home BP monitoring.     Dyslipidemia  -     Lipid Panel; Future  -     Lipid Panel    Impaired fasting glucose  -     Hemoglobin A1C; Future  -     Comprehensive Metabolic Panel; Future  -     Hemoglobin A1C  -     Comprehensive Metabolic Panel     Discussed the following complications of DM Type 2: CAD, CVD, Retinopathy, Nephropathy, Neuropathy.    Discussed Target A1C Goal <7.0% and Target fasting blood sugars () before each meal.    Discussed Lifestyle Modifications: Low glycemic index diet, Exercise (30-45 min) daily, Weight loss    Paroxysmal  atrial fibrillation  -     CBC Auto Differential; Future  -     CBC Auto Differential    Vitamin D insufficiency  -     Vitamin D; Future  -     Vitamin D    Class 3 severe obesity due to excess calories with serious comorbidity and body mass index (BMI) of 45.0 to 49.9 in adult  The patient is asked to make an attempt to improve diet and exercise patterns to aid in medical management of this problem.             Follow up with me in 6 months with labs prior to office visit.

## 2022-08-16 ENCOUNTER — HOSPITAL ENCOUNTER (EMERGENCY)
Facility: HOSPITAL | Age: 67
Discharge: HOME | End: 2022-08-16
Attending: EMERGENCY MEDICINE
Payer: MEDICARE

## 2022-08-16 VITALS
DIASTOLIC BLOOD PRESSURE: 70 MMHG | WEIGHT: 300 LBS | OXYGEN SATURATION: 95 % | TEMPERATURE: 98 F | SYSTOLIC BLOOD PRESSURE: 121 MMHG | RESPIRATION RATE: 18 BRPM | BODY MASS INDEX: 45.47 KG/M2 | HEIGHT: 68 IN | HEART RATE: 73 BPM

## 2022-08-16 DIAGNOSIS — M79.674 TOE PAIN, RIGHT: Primary | ICD-10-CM

## 2022-08-16 DIAGNOSIS — S99.929A FOOT INJURY: ICD-10-CM

## 2022-08-16 PROCEDURE — 25000003 PHARM REV CODE 250: Performed by: NURSE PRACTITIONER

## 2022-08-16 PROCEDURE — 99283 EMERGENCY DEPT VISIT LOW MDM: CPT

## 2022-08-16 RX ORDER — MUPIROCIN 20 MG/G
OINTMENT TOPICAL 2 TIMES DAILY
Qty: 15 G | Refills: 0 | Status: SHIPPED | OUTPATIENT
Start: 2022-08-16 | End: 2023-01-06

## 2022-08-16 RX ORDER — DICLOFENAC SODIUM 50 MG/1
50 TABLET, DELAYED RELEASE ORAL 3 TIMES DAILY PRN
Qty: 20 TABLET | Refills: 2 | Status: SHIPPED | OUTPATIENT
Start: 2022-08-16 | End: 2022-09-06

## 2022-08-16 RX ORDER — NAPROXEN 250 MG/1
500 TABLET ORAL
Status: COMPLETED | OUTPATIENT
Start: 2022-08-16 | End: 2022-08-16

## 2022-08-16 RX ORDER — MUPIROCIN 20 MG/G
OINTMENT TOPICAL
Status: COMPLETED | OUTPATIENT
Start: 2022-08-16 | End: 2022-08-16

## 2022-08-16 RX ADMIN — NAPROXEN 500 MG: 250 TABLET ORAL at 11:08

## 2022-08-16 RX ADMIN — MUPIROCIN 1 G: 20 OINTMENT TOPICAL at 11:08

## 2022-08-16 NOTE — FIRST PROVIDER EVALUATION
Emergency Department TeleTriage Encounter Note      CHIEF COMPLAINT    Chief Complaint   Patient presents with    Toe Injury     Right big toe injury - nail is senior living removed from toe       VITAL SIGNS   Initial Vitals [08/16/22 1024]   BP Pulse Resp Temp SpO2   121/70 73 18 97.7 °F (36.5 °C) 95 %      MAP       --            ALLERGIES    Review of patient's allergies indicates:  No Known Allergies    PROVIDER TRIAGE NOTE  Patient is a 67-year-old male who presents with injury to the right great toenail.  He states he was getting out of bed when he accidentally hit the toe causing the toenail to raise.  He reports intermittent issues with toenail fungus to that toe.    PE:  Patient is well-appearing and in no acute distress.  Answer my questions appropriately.  On unable to visualize the foot in triage.    Initial orders will be placed and care will be transferred to an alternate provider when patient is roomed for a full evaluation. Any additional orders and the final disposition will be determined by that provider.        ORDERS  Labs Reviewed - No data to display    ED Orders (720h ago, onward)    Start Ordered     Status Ordering Provider    08/16/22 1035 08/16/22 1034  X-Ray Foot Complete Right  1 time imaging         Ordered AN DRIVER            Virtual Visit Note: The provider triage portion of this emergency department evaluation and documentation was performed via [employer], a HIPAA-compliant telemedicine application, in concert with a tele-presenter in the room. A face to face patient evaluation with one of my colleagues will occur once the patient is placed in an emergency department room.      DISCLAIMER: This note was prepared with [facility] voice recognition transcription software. Garbled syntax, mangled pronouns, and other bizarre constructions may be attributed to that software system.

## 2022-08-16 NOTE — ED PROVIDER NOTES
Encounter Date: 8/16/2022       History     Chief Complaint   Patient presents with    Toe Injury     Right big toe injury - nail is residential removed from toe     Presents with complaint of lifting the nail of his right great toe this morning when she caught on it.  Patient is not a diabetic.  He has fungus on all 5 of those toenails.  He reports that he had an appointment at 1:30 a.m. with a podiatrist today in [place] but did not go because he had to come here the pain was so great.        Review of patient's allergies indicates:  No Known Allergies  Past Medical History:   Diagnosis Date    A-fib     Allergy     Anticoagulant long-term use     Arthritis     Asthma     as a child    Hyperlipidemia     Hypertension     Prostate cancer 2019    Sleep apnea     Use CPAP    Sleep apnea     uses C-PAP     Past Surgical History:   Procedure Laterality Date    CARPAL TUNNEL RELEASE Bilateral     CYSTOSCOPY N/A 1/28/2019    Procedure: CYSTOSCOPY;  Surgeon: Delmy Hernandez MD;  Location: [facility] OR;  Service: Urology;  Laterality: N/A;    CYSTOURETEROSCOPY WITH RETROGRADE PYELOGRAPHY AND INSERTION OF STENT INTO URETER Left 11/10/2021    Procedure: CYSTOURETEROSCOPY, WITH RETROGRADE PYELOGRAM AND URETERAL STENT INSERTION;  Surgeon: Delmy Hernandez MD;  Location: [facility] OR;  Service: Urology;  Laterality: Left;  please make 1st patient    INSERTION OF IMPLANTABLE LOOP RECORDER N/A 4/23/2021    Procedure: Insertion, Implantable Loop Recorder;  Surgeon: Almaz Rivas MD;  Location: [facility] CATH;  Service: Cardiology;  Laterality: N/A;    ROBOT-ASSISTED LAPAROSCOPIC PROSTATECTOMY USING DA LAVERN XI N/A 7/30/2020    Procedure: XI ROBOTIC PROSTATECTOMY;  Surgeon: Mejia Gold MD;  Location: [facility] OR;  Service: Urology;  Laterality: N/A;    TONSILLECTOMY      TRANSRECTAL BIOPSY OF PROSTATE WITH ULTRASOUND GUIDANCE N/A 1/28/2019    Procedure: BIOPSY, PROSTATE, RECTAL APPROACH, WITH US GUIDANCE;   Surgeon: Delmy Hernandez MD;  [facility]  Service: Urology;  Laterality: N/A;     Family History   Problem Relation Age of Onset    Cancer Mother     Asthma Mother     Diabetes Mother     Heart disease Mother     Hypertension Mother     Hypertension Father     Crohn's disease Brother     Cancer Maternal Grandmother      Social History     Tobacco Use    Smoking status: Former Smoker     Packs/day: 3.00     Years: 5.00     Pack years: 15.00     Types: Cigarettes     Quit date:      Years since quittin.6    Smokeless tobacco: Former User     Types: Chew    Tobacco comment: quit 40 yrs ago   Substance Use Topics    Alcohol use: Not Currently     Comment: occasional    Drug use: No     Review of Systems   Constitutional: Negative for fever.   Respiratory: Negative for cough, shortness of breath and wheezing.    Cardiovascular: Negative for chest pain, palpitations and leg swelling.   Gastrointestinal: Negative for abdominal pain, diarrhea, nausea and vomiting.   Musculoskeletal: Negative for back pain and gait problem.        Right great toenail and all other toes have fungus.  The right great toenail has lifted from the bed.   Skin: Negative for rash.   Neurological: Negative for weakness.       Physical Exam     Initial Vitals [22 1024]   BP Pulse Resp Temp SpO2   121/70 73 18 97.7 °F (36.5 °C) 95 %      MAP       --         Physical Exam    Constitutional: He appears well-developed and well-nourished.   HENT:   Mouth/Throat: Oropharynx is clear and moist.   Eyes: Conjunctivae are normal.   Neck: Neck supple.   Normal range of motion.  Cardiovascular: Normal rate, regular rhythm and normal heart sounds.   Pulmonary/Chest: Breath sounds normal. No respiratory distress.   Musculoskeletal:         General: Normal range of motion.      Cervical back: Normal range of motion and neck supple.      Comments: There is a partial nail avulsion to the right great toenail.  Fungus on 5  "toenails.  Patient is ambulatory per self.     Neurological: He is alert and oriented to person, place, and time. No sensory deficit. GCS score is 15. GCS eye subscore is 4. GCS verbal subscore is 5. GCS motor subscore is 6.   Skin: Skin is warm.   Psychiatric: He has a normal mood and affect. Thought content normal.         ED Course   Procedures  Labs Reviewed - No data to display       Imaging Results          X-Ray Foot Complete Right (Final result)  Result time 08/16/22 11:18:02    Final result by Adis Griffin MD (08/16/22 11:18:02)                 Narrative:    Reason: Great toe injury.    FINDINGS:    Three views of the right foot show no fracture, dislocation, or destructive osseous lesion. Achilles and plantar calcaneal enthesopathy noted. There is mild joint space narrowing with osteophytosis of the first MTP joint. No gross soft tissue abnormality.    IMPRESSION:    1.  No acute osseous abnormality.  2.  Mild osteoarthropathy of the first MTP joint.    Electronically signed by:  Adis Griffin DO  8/16/2022 11:18 AM CDT Workstation: [id number]                               Medications   naproxen tablet 500 mg (500 mg Oral Given 8/16/22 1130)   mupirocin 2 % ointment (1 g Topical (Top) Given 8/16/22 1130)     Medical Decision Making:   Initial Assessment:   Presents with partial avulsion to the right great toenail.  Patient has done this on all 5 toenails.  He reports that he caught the bed sheet on the right great toenail repeating it this morning.  He also reports that he call the podiatrist had an appointment at 1:30 a.m. today.  He came here instead because it "hurts so much"  Clinical Tests:   Radiological Study: Reviewed  ED Management:  X-rays negative for fracture.  Patient has been instructed to keep the appointment at 1:30 a.m. with the podiatrist wound was cleaned here Betadine and saline.  Antibiotic ointment Bactroban was applied to the nail.  Dressing was applied.  Patient was given " naproxen here for pain and diclofenac for home use.  At discharge is patient appears comfortable in no acute distress.  Have discussed this patient with Dr. Simon                      Clinical Impression:   Final diagnoses:  [V77.359Q] Foot injury  [M79.674] Toe pain, right (Primary)          ED Disposition Condition    Discharge Stable        ED Prescriptions     Medication Sig Dispense Start Date End Date Auth. Provider    diclofenac (VOLTAREN) 50 MG EC tablet Take 1 tablet (50 mg total) by mouth 3 (three) times daily as needed. 20 tablet 8/16/2022  Kayleen Lee NP    mupirocin (BACTROBAN) 2 % ointment Apply topically 2 (two) times daily. 15 g 8/16/2022  Kayleen Lee NP        Follow-up Information    None          Kayleen Lee NP  08/16/22 6759

## 2022-09-06 ENCOUNTER — OFFICE VISIT (OUTPATIENT)
Dept: FAMILY MEDICINE | Facility: CLINIC | Age: 67
End: 2022-09-06
Payer: MEDICARE

## 2022-09-06 VITALS
HEIGHT: 68 IN | TEMPERATURE: 99 F | WEIGHT: 311 LBS | BODY MASS INDEX: 47.13 KG/M2 | DIASTOLIC BLOOD PRESSURE: 60 MMHG | HEART RATE: 88 BPM | SYSTOLIC BLOOD PRESSURE: 110 MMHG | OXYGEN SATURATION: 97 %

## 2022-09-06 DIAGNOSIS — E66.01 CLASS 3 SEVERE OBESITY DUE TO EXCESS CALORIES WITH SERIOUS COMORBIDITY AND BODY MASS INDEX (BMI) OF 45.0 TO 49.9 IN ADULT: ICD-10-CM

## 2022-09-06 DIAGNOSIS — G89.29 CHRONIC RIGHT SHOULDER PAIN: Primary | ICD-10-CM

## 2022-09-06 DIAGNOSIS — M25.511 CHRONIC RIGHT SHOULDER PAIN: Primary | ICD-10-CM

## 2022-09-06 DIAGNOSIS — M25.619 LIMITED RANGE OF MOTION (ROM) OF SHOULDER: ICD-10-CM

## 2022-09-06 DIAGNOSIS — I48.0 PAROXYSMAL ATRIAL FIBRILLATION: ICD-10-CM

## 2022-09-06 DIAGNOSIS — I10 ESSENTIAL (PRIMARY) HYPERTENSION: ICD-10-CM

## 2022-09-06 DIAGNOSIS — E03.9 ACQUIRED HYPOTHYROIDISM: ICD-10-CM

## 2022-09-06 PROCEDURE — 3044F HG A1C LEVEL LT 7.0%: CPT | Mod: CPTII,S$GLB,, | Performed by: NURSE PRACTITIONER

## 2022-09-06 PROCEDURE — 3078F PR MOST RECENT DIASTOLIC BLOOD PRESSURE < 80 MM HG: ICD-10-PCS | Mod: CPTII,S$GLB,, | Performed by: NURSE PRACTITIONER

## 2022-09-06 PROCEDURE — 1159F MED LIST DOCD IN RCRD: CPT | Mod: CPTII,S$GLB,, | Performed by: NURSE PRACTITIONER

## 2022-09-06 PROCEDURE — 3288F PR FALLS RISK ASSESSMENT DOCUMENTED: ICD-10-PCS | Mod: CPTII,S$GLB,, | Performed by: NURSE PRACTITIONER

## 2022-09-06 PROCEDURE — 99213 OFFICE O/P EST LOW 20 MIN: CPT | Mod: S$GLB,,, | Performed by: NURSE PRACTITIONER

## 2022-09-06 PROCEDURE — 3074F SYST BP LT 130 MM HG: CPT | Mod: CPTII,S$GLB,, | Performed by: NURSE PRACTITIONER

## 2022-09-06 PROCEDURE — 3288F FALL RISK ASSESSMENT DOCD: CPT | Mod: CPTII,S$GLB,, | Performed by: NURSE PRACTITIONER

## 2022-09-06 PROCEDURE — 1125F PR PAIN SEVERITY QUANTIFIED, PAIN PRESENT: ICD-10-PCS | Mod: CPTII,S$GLB,, | Performed by: NURSE PRACTITIONER

## 2022-09-06 PROCEDURE — 3074F PR MOST RECENT SYSTOLIC BLOOD PRESSURE < 130 MM HG: ICD-10-PCS | Mod: CPTII,S$GLB,, | Performed by: NURSE PRACTITIONER

## 2022-09-06 PROCEDURE — 3044F PR MOST RECENT HEMOGLOBIN A1C LEVEL <7.0%: ICD-10-PCS | Mod: CPTII,S$GLB,, | Performed by: NURSE PRACTITIONER

## 2022-09-06 PROCEDURE — 1160F RVW MEDS BY RX/DR IN RCRD: CPT | Mod: CPTII,S$GLB,, | Performed by: NURSE PRACTITIONER

## 2022-09-06 PROCEDURE — 3008F BODY MASS INDEX DOCD: CPT | Mod: CPTII,S$GLB,, | Performed by: NURSE PRACTITIONER

## 2022-09-06 PROCEDURE — 1101F PT FALLS ASSESS-DOCD LE1/YR: CPT | Mod: CPTII,S$GLB,, | Performed by: NURSE PRACTITIONER

## 2022-09-06 PROCEDURE — 4010F PR ACE/ARB THEARPY RXD/TAKEN: ICD-10-PCS | Mod: CPTII,S$GLB,, | Performed by: NURSE PRACTITIONER

## 2022-09-06 PROCEDURE — 1160F PR REVIEW ALL MEDS BY PRESCRIBER/CLIN PHARMACIST DOCUMENTED: ICD-10-PCS | Mod: CPTII,S$GLB,, | Performed by: NURSE PRACTITIONER

## 2022-09-06 PROCEDURE — 1159F PR MEDICATION LIST DOCUMENTED IN MEDICAL RECORD: ICD-10-PCS | Mod: CPTII,S$GLB,, | Performed by: NURSE PRACTITIONER

## 2022-09-06 PROCEDURE — 99213 PR OFFICE/OUTPT VISIT, EST, LEVL III, 20-29 MIN: ICD-10-PCS | Mod: S$GLB,,, | Performed by: NURSE PRACTITIONER

## 2022-09-06 PROCEDURE — 1101F PR PT FALLS ASSESS DOC 0-1 FALLS W/OUT INJ PAST YR: ICD-10-PCS | Mod: CPTII,S$GLB,, | Performed by: NURSE PRACTITIONER

## 2022-09-06 PROCEDURE — 4010F ACE/ARB THERAPY RXD/TAKEN: CPT | Mod: CPTII,S$GLB,, | Performed by: NURSE PRACTITIONER

## 2022-09-06 PROCEDURE — 3078F DIAST BP <80 MM HG: CPT | Mod: CPTII,S$GLB,, | Performed by: NURSE PRACTITIONER

## 2022-09-06 PROCEDURE — 1125F AMNT PAIN NOTED PAIN PRSNT: CPT | Mod: CPTII,S$GLB,, | Performed by: NURSE PRACTITIONER

## 2022-09-06 PROCEDURE — 3008F PR BODY MASS INDEX (BMI) DOCUMENTED: ICD-10-PCS | Mod: CPTII,S$GLB,, | Performed by: NURSE PRACTITIONER

## 2022-09-06 RX ORDER — MELOXICAM 15 MG/1
15 TABLET ORAL DAILY PRN
Qty: 30 TABLET | Refills: 2 | Status: SHIPPED | OUTPATIENT
Start: 2022-09-06

## 2022-09-06 RX ORDER — METHYLPREDNISOLONE 4 MG/1
TABLET ORAL
Qty: 1 EACH | Refills: 0 | Status: SHIPPED | OUTPATIENT
Start: 2022-09-06 | End: 2022-09-27

## 2022-09-06 RX ORDER — LEVOTHYROXINE SODIUM 50 UG/1
TABLET ORAL
COMMUNITY
Start: 2022-08-31 | End: 2023-01-10 | Stop reason: SDUPTHER

## 2022-09-06 NOTE — PROGRESS NOTES
Subjective:       Patient ID: Marcus Parisi is a 67 y.o. male.    Chief Complaint: right arm pain and right arm numbness    Shoulder Pain   The pain is present in the right shoulder. This is a recurrent problem. The current episode started 1 to 4 weeks ago. There has been no history of extremity trauma. The problem occurs daily. The problem has been waxing and waning. The pain is moderate. Associated symptoms include a limited range of motion and stiffness. Pertinent negatives include no fever, headaches, inability to bear weight, joint swelling, numbness or tingling. He has tried rest, NSAIDS and movement for the symptoms. The treatment provided mild relief. Family history includes arthritis.   Arm Pain   The incident occurred more than 1 week ago. The pain is present in the upper left arm, upper right arm and right shoulder. The quality of the pain is described as aching and cramping. The pain is moderate. The pain has been Fluctuating since the incident. Associated symptoms include muscle weakness. Pertinent negatives include no chest pain, numbness or tingling. The symptoms are aggravated by movement. He has tried rest for the symptoms. The treatment provided mild relief.   Review of Systems   Constitutional:  Positive for activity change. Negative for appetite change, chills, diaphoresis, fatigue, fever and unexpected weight change.        Obesity   HENT:  Negative for congestion, ear discharge, ear pain, hearing loss, sore throat, trouble swallowing and voice change.    Eyes:  Negative for photophobia, pain and visual disturbance.   Respiratory:  Negative for cough, chest tightness and shortness of breath.    Cardiovascular:  Negative for chest pain, palpitations and leg swelling.        Afib   Gastrointestinal:  Negative for abdominal pain, constipation, diarrhea, nausea and vomiting.   Endocrine: Negative for cold intolerance and heat intolerance.   Genitourinary:  Negative for difficulty urinating, dysuria  and flank pain.   Musculoskeletal:  Positive for stiffness. Negative for arthralgias, gait problem and myalgias.   Skin:  Negative for rash.   Allergic/Immunologic: Negative for immunocompromised state.   Neurological:  Negative for dizziness, tingling, weakness, numbness and headaches.   Hematological:  Negative for adenopathy.   Psychiatric/Behavioral:  Negative for agitation, confusion, self-injury and suicidal ideas.      Past Medical History:   Diagnosis Date    A-fib     Allergy     Anticoagulant long-term use     Arthritis     Asthma     as a child    Hyperlipidemia     Hypertension     Prostate cancer 2019    Sleep apnea     Use CPAP    Sleep apnea     uses C-PAP      Past Surgical History:   Procedure Laterality Date    CARPAL TUNNEL RELEASE Bilateral     CYSTOSCOPY N/A 1/28/2019    Procedure: CYSTOSCOPY;  Surgeon: Delmy Hernandez MD;  Location: [facility] OR;  Service: Urology;  Laterality: N/A;    CYSTOURETEROSCOPY WITH RETROGRADE PYELOGRAPHY AND INSERTION OF STENT INTO URETER Left 11/10/2021    Procedure: CYSTOURETEROSCOPY, WITH RETROGRADE PYELOGRAM AND URETERAL STENT INSERTION;  Surgeon: Delmy Hernandez MD;  Location: [facility] OR;  Service: Urology;  Laterality: Left;  please make 1st patient    INSERTION OF IMPLANTABLE LOOP RECORDER N/A 4/23/2021    Procedure: Insertion, Implantable Loop Recorder;  Surgeon: Almaz Rivas MD;  Location: [facility] CATH;  Service: Cardiology;  Laterality: N/A;    ROBOT-ASSISTED LAPAROSCOPIC PROSTATECTOMY USING DA LAVERN XI N/A 7/30/2020    Procedure: XI ROBOTIC PROSTATECTOMY;  Surgeon: Mejia Gold MD;  Location: [facility] OR;  Service: Urology;  Laterality: N/A;    TONSILLECTOMY      TRANSRECTAL BIOPSY OF PROSTATE WITH ULTRASOUND GUIDANCE N/A 1/28/2019    Procedure: BIOPSY, PROSTATE, RECTAL APPROACH, WITH US GUIDANCE;  Surgeon: Delmy Hernandez MD;  Location: [facility] OR;  Service: Urology;  Laterality: N/A;       Family History   Problem Relation Age of Onset  "   Cancer Mother     Asthma Mother     Diabetes Mother     Heart disease Mother     Hypertension Mother     Hypertension Father     Crohn's disease Brother     Cancer Maternal Grandmother        Social History     Socioeconomic History    Marital status:    Tobacco Use    Smoking status: Former     Packs/day: 3.00     Years: 5.00     Pack years: 15.00     Types: Cigarettes     Quit date:      Years since quittin.7    Smokeless tobacco: Former     Types: Chew    Tobacco comments:     quit 40 yrs ago   Substance and Sexual Activity    Alcohol use: Not Currently     Comment: occasional    Drug use: No    Sexual activity: Yes     Partners: Female       Current Outpatient Medications   Medication Sig Dispense Refill    amlodipine-benazepril 5-20 mg (LOTREL) 5-20 mg per capsule Take 1 capsule by mouth once daily 90 capsule 1    atorvastatin (LIPITOR) 20 MG tablet TAKE 1 TABLET BY MOUTH ONCE DAILY IN THE EVENING 90 tablet 1    cyanocobalamin (VITAMIN B-12) 1000 MCG tablet Take 1,000 mcg by mouth once daily.       ELIQUIS 5 mg Tab Take 1 tablet by mouth twice daily 180 tablet 1    mupirocin (BACTROBAN) 2 % ointment Apply topically 2 (two) times daily. 15 g 0    OMEGA-3 ACID ETHYL ESTERS ORAL Take 1 capsule by mouth once daily.       sotaloL (BETAPACE) 120 MG Tab Take 1 tablet (120 mg total) by mouth every 12 (twelve) hours. 180 tablet 1    SYNTHROID 50 mcg tablet       tadalafiL (CIALIS) 20 MG Tab Take 1 tablet (20 mg total) by mouth as needed (as needed prior to intercourse). 30 tablet 5    meloxicam (MOBIC) 15 MG tablet Take 1 tablet (15 mg total) by mouth daily as needed for Pain. 30 tablet 2    methylPREDNISolone (MEDROL DOSEPACK) 4 mg tablet use as directed 1 each 0     No current facility-administered medications for this visit.       Review of patient's allergies indicates:  No Known Allergies  Objective:      Blood pressure 110/60, pulse 88, temperature 99.1 °F (37.3 °C), height 5' 8" (1.727 m), " weight (!) 141.1 kg (311 lb), SpO2 97 %. Body mass index is 47.29 kg/m².   Physical Exam  Vitals and nursing note reviewed.   Constitutional:       General: He is not in acute distress.     Appearance: Normal appearance. He is well-developed. He is obese. He is not ill-appearing.   HENT:      Head: Normocephalic and atraumatic.      Nose: Nose normal.      Mouth/Throat:      Mouth: Mucous membranes are moist.      Pharynx: Uvula midline.   Eyes:      General: Lids are normal.      Extraocular Movements: Extraocular movements intact.      Conjunctiva/sclera: Conjunctivae normal.      Pupils: Pupils are equal, round, and reactive to light.   Cardiovascular:      Rate and Rhythm: Normal rate. Rhythm irregularly irregular.      Heart sounds: Normal heart sounds, S1 normal and S2 normal. No murmur heard.  Pulmonary:      Effort: Pulmonary effort is normal. No respiratory distress.      Breath sounds: Normal breath sounds.   Abdominal:      General: Bowel sounds are normal.      Palpations: Abdomen is soft.      Tenderness: There is no abdominal tenderness.   Musculoskeletal:      Right shoulder: Tenderness present. Decreased range of motion. Decreased strength.      Right upper arm: Tenderness present.      Left upper arm: Tenderness present.      Cervical back: Normal range of motion and neck supple.   Lymphadenopathy:      Cervical: No cervical adenopathy.   Skin:     General: Skin is warm and dry.      Findings: No rash.   Neurological:      Mental Status: He is alert and oriented to person, place, and time.   Psychiatric:         Mood and Affect: Mood normal.         Speech: Speech normal.         Behavior: Behavior normal.         Thought Content: Thought content normal.         Judgment: Judgment normal.           Assessment:       1. Chronic right shoulder pain    2. Limited range of motion (ROM) of shoulder    3. Acquired hypothyroidism    4. Essential (primary) hypertension    5. Paroxysmal atrial fibrillation     6. Class 3 severe obesity due to excess calories with serious comorbidity and body mass index (BMI) of 45.0 to 49.9 in adult          Plan:       Marcus was seen today for right arm pain and right arm numbness.    Diagnoses and all orders for this visit:    Chronic right shoulder pain  -     methylPREDNISolone (MEDROL DOSEPACK) 4 mg tablet; use as directed  -     meloxicam (MOBIC) 15 MG tablet; Take 1 tablet (15 mg total) by mouth daily as needed for Pain.    Limited range of motion (ROM) of shoulder  -     methylPREDNISolone (MEDROL DOSEPACK) 4 mg tablet; use as directed    Acquired hypothyroidism  Stable  Continue current medications    Essential (primary) hypertension  Stable.  Continue current medications    Paroxysmal atrial fibrillation  Stable.  Continue current medications    Class 3 severe obesity due to excess calories with serious comorbidity and body mass index (BMI) of 45.0 to 49.9 in adult  The patient is asked to make an attempt to improve diet and exercise patterns to aid in medical management of this problem.

## 2022-11-22 ENCOUNTER — LAB VISIT (OUTPATIENT)
Dept: LAB | Facility: HOSPITAL | Age: 67
End: 2022-11-22
Attending: UROLOGY
Payer: MEDICARE

## 2022-11-22 DIAGNOSIS — N20.0 NEPHROLITHIASIS: ICD-10-CM

## 2022-11-22 DIAGNOSIS — C61 MALIGNANT NEOPLASM OF PROSTATE: ICD-10-CM

## 2022-11-22 LAB — COMPLEXED PSA SERPL-MCNC: 0.05 NG/ML (ref 0–4)

## 2022-11-22 PROCEDURE — 36415 COLL VENOUS BLD VENIPUNCTURE: CPT | Performed by: UROLOGY

## 2022-11-22 PROCEDURE — 84153 ASSAY OF PSA TOTAL: CPT | Performed by: UROLOGY

## 2022-11-29 ENCOUNTER — OFFICE VISIT (OUTPATIENT)
Dept: UROLOGY | Facility: CLINIC | Age: 67
End: 2022-11-29
Payer: MEDICARE

## 2022-11-29 VITALS
HEIGHT: 69 IN | SYSTOLIC BLOOD PRESSURE: 132 MMHG | DIASTOLIC BLOOD PRESSURE: 74 MMHG | WEIGHT: 315 LBS | HEART RATE: 96 BPM | BODY MASS INDEX: 46.65 KG/M2

## 2022-11-29 DIAGNOSIS — N52.1 ERECTILE DYSFUNCTION DUE TO DISEASES CLASSIFIED ELSEWHERE: ICD-10-CM

## 2022-11-29 DIAGNOSIS — N20.0 NEPHROLITHIASIS: ICD-10-CM

## 2022-11-29 DIAGNOSIS — C61 PROSTATE CANCER: Primary | ICD-10-CM

## 2022-11-29 LAB
BILIRUBIN, UA POC OHS: NEGATIVE
BLOOD, UA POC OHS: ABNORMAL
CLARITY, UA POC OHS: CLEAR
COLOR, UA POC OHS: YELLOW
GLUCOSE, UA POC OHS: NEGATIVE
KETONES, UA POC OHS: NEGATIVE
LEUKOCYTES, UA POC OHS: NEGATIVE
NITRITE, UA POC OHS: NEGATIVE
PH, UA POC OHS: 5.5
PROTEIN, UA POC OHS: NEGATIVE
SPECIFIC GRAVITY, UA POC OHS: >=1.03
UROBILINOGEN, UA POC OHS: 0.2

## 2022-11-29 PROCEDURE — 3288F PR FALLS RISK ASSESSMENT DOCUMENTED: ICD-10-PCS | Mod: CPTII,S$GLB,, | Performed by: UROLOGY

## 2022-11-29 PROCEDURE — 1159F MED LIST DOCD IN RCRD: CPT | Mod: CPTII,S$GLB,, | Performed by: UROLOGY

## 2022-11-29 PROCEDURE — 81003 URINALYSIS AUTO W/O SCOPE: CPT | Mod: QW,S$GLB,, | Performed by: UROLOGY

## 2022-11-29 PROCEDURE — 1126F PR PAIN SEVERITY QUANTIFIED, NO PAIN PRESENT: ICD-10-PCS | Mod: CPTII,S$GLB,, | Performed by: UROLOGY

## 2022-11-29 PROCEDURE — 3078F DIAST BP <80 MM HG: CPT | Mod: CPTII,S$GLB,, | Performed by: UROLOGY

## 2022-11-29 PROCEDURE — 1101F PT FALLS ASSESS-DOCD LE1/YR: CPT | Mod: CPTII,S$GLB,, | Performed by: UROLOGY

## 2022-11-29 PROCEDURE — 3008F PR BODY MASS INDEX (BMI) DOCUMENTED: ICD-10-PCS | Mod: CPTII,S$GLB,, | Performed by: UROLOGY

## 2022-11-29 PROCEDURE — 3008F BODY MASS INDEX DOCD: CPT | Mod: CPTII,S$GLB,, | Performed by: UROLOGY

## 2022-11-29 PROCEDURE — 99999 PR PBB SHADOW E&M-EST. PATIENT-LVL III: CPT | Mod: PBBFAC,,, | Performed by: UROLOGY

## 2022-11-29 PROCEDURE — 4010F PR ACE/ARB THEARPY RXD/TAKEN: ICD-10-PCS | Mod: CPTII,S$GLB,, | Performed by: UROLOGY

## 2022-11-29 PROCEDURE — 99999 PR PBB SHADOW E&M-EST. PATIENT-LVL III: ICD-10-PCS | Mod: PBBFAC,,, | Performed by: UROLOGY

## 2022-11-29 PROCEDURE — 1101F PR PT FALLS ASSESS DOC 0-1 FALLS W/OUT INJ PAST YR: ICD-10-PCS | Mod: CPTII,S$GLB,, | Performed by: UROLOGY

## 2022-11-29 PROCEDURE — 3044F HG A1C LEVEL LT 7.0%: CPT | Mod: CPTII,S$GLB,, | Performed by: UROLOGY

## 2022-11-29 PROCEDURE — 3078F PR MOST RECENT DIASTOLIC BLOOD PRESSURE < 80 MM HG: ICD-10-PCS | Mod: CPTII,S$GLB,, | Performed by: UROLOGY

## 2022-11-29 PROCEDURE — 3075F PR MOST RECENT SYSTOLIC BLOOD PRESS GE 130-139MM HG: ICD-10-PCS | Mod: CPTII,S$GLB,, | Performed by: UROLOGY

## 2022-11-29 PROCEDURE — 4010F ACE/ARB THERAPY RXD/TAKEN: CPT | Mod: CPTII,S$GLB,, | Performed by: UROLOGY

## 2022-11-29 PROCEDURE — 3075F SYST BP GE 130 - 139MM HG: CPT | Mod: CPTII,S$GLB,, | Performed by: UROLOGY

## 2022-11-29 PROCEDURE — 3044F PR MOST RECENT HEMOGLOBIN A1C LEVEL <7.0%: ICD-10-PCS | Mod: CPTII,S$GLB,, | Performed by: UROLOGY

## 2022-11-29 PROCEDURE — 99215 OFFICE O/P EST HI 40 MIN: CPT | Mod: S$GLB,,, | Performed by: UROLOGY

## 2022-11-29 PROCEDURE — 1159F PR MEDICATION LIST DOCUMENTED IN MEDICAL RECORD: ICD-10-PCS | Mod: CPTII,S$GLB,, | Performed by: UROLOGY

## 2022-11-29 PROCEDURE — 99215 PR OFFICE/OUTPT VISIT, EST, LEVL V, 40-54 MIN: ICD-10-PCS | Mod: S$GLB,,, | Performed by: UROLOGY

## 2022-11-29 PROCEDURE — 81003 POCT URINALYSIS(INSTRUMENT): ICD-10-PCS | Mod: QW,S$GLB,, | Performed by: UROLOGY

## 2022-11-29 PROCEDURE — 1126F AMNT PAIN NOTED NONE PRSNT: CPT | Mod: CPTII,S$GLB,, | Performed by: UROLOGY

## 2022-11-29 PROCEDURE — 3288F FALL RISK ASSESSMENT DOCD: CPT | Mod: CPTII,S$GLB,, | Performed by: UROLOGY

## 2022-11-29 NOTE — PATIENT INSTRUCTIONS
Continue to monitor diagnostic psa for H/o Prostate cancer, Gl 4+3 with teriary 5 in <5% s/p ralp in 7/2020 with + margins with now slowly rising psa.  . Very slowly rising. Now 0.05. diagnostic psa in 3months and f/u after.   Calculated PSADT but not really a good indicator with PSA<0.2  Could do a psma scan once psa >0.2  Could add ADT if psa continues to rise >0.5.   Rbus and kub in 3 months to monitor 2 small stones on right not seen on last rbus.. Ua today with tr bld. No pain.   Continue to use Cialis as needed.   Return sooner if having any difficulty urinating since he has h/o BNC. Last peak flow 24.     F/u in 3 months with rbus, kub and diagnostic psa prior               Generally speaking, PSMA PET Scans using PSMA (18F-DCFPyL) have been shown to be effective at PSA levels above 0.2ng/mL. Also generally speaking, the higher the PSA level (when restaging patients), the more likely a PSMA PET Scan is to detect and identify recurrent prostate cancer.  psa diagnostic in 6 months and f/u after (standing orders in).   But if psa rising, will order prostate mri to eval for any local recurrence at sites of positive margins.

## 2022-11-29 NOTE — PROGRESS NOTES
[facility] H&P Note - [place]  Staff: MD Mary     Referring provider and please cc:   PCP: [facility]     Chief Complaint: prostate cancer     Subjective:        HPI: Marcus Parisi is a 64 y.o. male presents with  Prostate cancer, T2c Gl3+3, T2c psa 2.9, NxMx prostate nodules, B apex and MH.  He was initially seen by me in 2018 for rising psa.   He was found to have Gl 3+3 in 4/14 cores Jan 2019 and was referred due to insurance issues to .    took him for a RALP on 7/1/20. Gl 4+3 with teriary 5 in <5%. Tumor involves 20 to 25% of prostate. Right apical and Left posterior margins involved by cancer.  Also had a h/o stones 10+ years ago. Had a stent but leaked continuously   9/11/21 psa at [facility] <0.1  10/27/21: Saw francoise on 10/27/21 and was c/o gross hematuria. CTU showed mid to distal L ureteral 3mm stone. Pt wanted it removed.     Interval history 11/3/21  He is here in preop and says he hasn't had any hematuria in 3 to 4 days. He's had no flank pain in 3 weeks. He never received flomax ([facility]). He also has not passed stone.  Prostate cancer- still has some incontinence  He was discharged on flomax with a plan for a repeat ctrss     Interval history 11/10/21:  ctrss 11/8/21 showed L ureteral 4mm stone in same position with moderate hydro, no left renal stones. 2, 2mm stones in right kidney. Long bladder . Therefore here today for cystoscopy and ureteroscopy to extract stone as long as no infection beyond stone   psa is 0.03 (no previous super sensitive psa's to go by)  Still with LUIS ENRIQUE    Interval history 2/8/22:  11/10/21 cysto L urs and stone extraction. Stone was not radioopaque. Inflammation around stone. Found to have BNC. Left stent on strings. Stones mostly calcium oxalate.   1/25/22 psa up to 0.04  rbus 2/3/22: 8mm stone on L wo hydro. No stone on right  kub 2/3/22: no stones seen     Hasn't passed any stones.  Didn't notice much diffference after procedure  (no increased flow) and denies any slow flow. Denies any frequency or ugency. Still has LUIS ENRIQUE.     Interval history since last visit with me:  Uroflow results (date: 02/16/2022): Voiding time: 21.1s, Flow time: 17.2s, TTP flow: 9.3s, Peak flowrate: 24.1 mL/s, Average flowrate: 13.5mL/s, Intervals: 2, Voided volume: 233 mL, Pvr by bladder scan: 0mL . Pattern of curve: see uploaded image, reviewed by     5/17/122 0.04    5/24/22: He apparently is using cialis 20mg daily. Should not be doing this daily. Not interested in LAURA. Not a good candidate for inection. On eliquis.     Interval history 11/29/22:  Had a psa done on 11/22/22 and was 0.05. still urinating well with no issues.   Using cialis as needed now, not daily. Doing well on this.   Ua today with tr bld but no uti sx. No flank pain.     psa history   11/22/22 0.05  5/24/22  pvr by scan: 11  5/17/22 0.04   2/8/22  pvr by scan: 0  1/25/22 0.04  11/8/21 0.03  9/11/21 <0.1   7/31/20 RALP Gl 4+3 with teriary 5 in <5%. Tumor involves 20 to 25% of prostate.   1/28/19  psa 2.9, trus vol: 40.5g, T2c (b palp nodules), Gl 3+3 in RBM, LML, LONDONO, LTZ (4/14).  HGPIN in  RBL, RBM (2/14). ASAP in SHELLY (1/14)  01/14/19          2.9, %free 15.5, JAHAIRA: 20g, definite nodule on left apex and one on right apex.   10/9/18            JAHAIRA: 20g gland without any discrete masses, left side however more firm, no tenderness.  9/18/18            2.1  9/2017             1.2      Urine history:   5/24/22 Tr bld  02/8/22 neg  11/10/21 90%calcium oxalate/10% calcium phosphate  10/27/21 Ng, void: red, 3+bld, >100 rbc, cyt: neg.   6/19/21 coreen  1/28/19 Ng, void: tr wbc- no sx of uti  1/21/19 ng, void: neg  1/15/19 No cx, void: tr blood, cytology: negative  10/9/18            No cx, void: neg       REVIEW OF SYSTEMS:  General ROS: no fevers, no chills  Psychological ROS: no depression  Endocrine ROS: no heat or cold  Respiratory ROS: no  SOB  Cardiovascular ROS: no CP  Gastrointestinal ROS: no abdominal pain, no constipation, non diarrhea, on BRBPR  Musculoskeletal ROS: no muscle pain  Neurological ROS: kapil headaches  Dermatological ROS: no rashes  HEENT: no glasses, no sinus   ROS: per HPI     Objective:      Vitals:    11/29/22 1119   BP: 132/74   Pulse: 96             Assessment:         Marcus Parisi is a 67 y.o. male with     1. Prostate cancer    2. Nephrolithiasis    3. Erectile dysfunction due to diseases classified elsewhere           Plan:     Continue to monitor diagnostic psa for H/o Prostate cancer, Gl 4+3 with teriary 5 in <5% s/p ralp in 7/2020 with + margins with now slowly rising psa.  . Very slowly rising. Now 0.05. diagnostic psa in 3months and f/u after.   Calculated PSADT but not really a good indicator with PSA<0.2  Could do a psma scan once psa >0.2  Could add ADT if psa continues to rise >0.5.   Rbus and kub in 3 months to monitor 2 small stones on right not seen on last rbus.. Ua today with tr bld. No pain.   Continue to use Cialis as needed.   Return sooner if having any difficulty urinating since he has h/o BNC. Last peak flow 24.     F/u in 3 months with rbus, kub and diagnostic psa prior               Generally speaking, PSMA PET Scans using PSMA (18F-DCFPyL) have been shown to be effective at PSA levels above 0.2ng/mL. Also generally speaking, the higher the PSA level (when restaging patients), the more likely a PSMA PET Scan is to detect and identify recurrent prostate cancer.  psa diagnostic in 6 months and f/u after (standing orders in).   But if psa rising, will order prostate mri to eval for any local recurrence at sites of positive margins.

## 2022-12-10 LAB
25(OH)D3 SERPL-MCNC: 41 NG/ML (ref 30–100)
ALBUMIN SERPL-MCNC: 4.2 G/DL (ref 3.6–5.1)
ALBUMIN/GLOB SERPL: 1.9 (CALC) (ref 1–2.5)
ALP SERPL-CCNC: 76 U/L (ref 35–144)
ALT SERPL-CCNC: 14 U/L (ref 9–46)
APPEARANCE UR: CLEAR
AST SERPL-CCNC: 13 U/L (ref 10–35)
BASOPHILS # BLD AUTO: 29 CELLS/UL (ref 0–200)
BASOPHILS NFR BLD AUTO: 0.7 %
BILIRUB SERPL-MCNC: 1.6 MG/DL (ref 0.2–1.2)
BILIRUB UR QL STRIP: NEGATIVE
BUN SERPL-MCNC: 13 MG/DL (ref 7–25)
BUN/CREAT SERPL: ABNORMAL (CALC) (ref 6–22)
CALCIUM SERPL-MCNC: 9.3 MG/DL (ref 8.6–10.3)
CHLORIDE SERPL-SCNC: 105 MMOL/L (ref 98–110)
CHOLEST SERPL-MCNC: 121 MG/DL
CHOLEST/HDLC SERPL: 3.6 (CALC)
CO2 SERPL-SCNC: 26 MMOL/L (ref 20–32)
COLOR UR: YELLOW
CREAT SERPL-MCNC: 0.91 MG/DL (ref 0.7–1.35)
EGFR: 92 ML/MIN/1.73M2
EOSINOPHIL # BLD AUTO: 176 CELLS/UL (ref 15–500)
EOSINOPHIL NFR BLD AUTO: 4.2 %
ERYTHROCYTE [DISTWIDTH] IN BLOOD BY AUTOMATED COUNT: 14 % (ref 11–15)
GLOBULIN SER CALC-MCNC: 2.2 G/DL (CALC) (ref 1.9–3.7)
GLUCOSE SERPL-MCNC: 143 MG/DL (ref 65–99)
GLUCOSE UR QL STRIP: NEGATIVE
HBA1C MFR BLD: 5.8 % OF TOTAL HGB
HCT VFR BLD AUTO: 45.1 % (ref 38.5–50)
HDLC SERPL-MCNC: 34 MG/DL
HGB BLD-MCNC: 15.1 G/DL (ref 13.2–17.1)
HGB UR QL STRIP: NEGATIVE
KETONES UR QL STRIP: NEGATIVE
LDLC SERPL CALC-MCNC: 70 MG/DL (CALC)
LEUKOCYTE ESTERASE UR QL STRIP: NEGATIVE
LYMPHOCYTES # BLD AUTO: 752 CELLS/UL (ref 850–3900)
LYMPHOCYTES NFR BLD AUTO: 17.9 %
MCH RBC QN AUTO: 29.8 PG (ref 27–33)
MCHC RBC AUTO-ENTMCNC: 33.5 G/DL (ref 32–36)
MCV RBC AUTO: 89 FL (ref 80–100)
MONOCYTES # BLD AUTO: 378 CELLS/UL (ref 200–950)
MONOCYTES NFR BLD AUTO: 9 %
NEUTROPHILS # BLD AUTO: 2864 CELLS/UL (ref 1500–7800)
NEUTROPHILS NFR BLD AUTO: 68.2 %
NITRITE UR QL STRIP: NEGATIVE
NONHDLC SERPL-MCNC: 87 MG/DL (CALC)
PH UR STRIP: NORMAL [PH] (ref 5–8)
PLATELET # BLD AUTO: 161 THOUSAND/UL (ref 140–400)
PMV BLD REES-ECKER: 10.5 FL (ref 7.5–12.5)
POTASSIUM SERPL-SCNC: 4.1 MMOL/L (ref 3.5–5.3)
PROT SERPL-MCNC: 6.4 G/DL (ref 6.1–8.1)
PROT UR QL STRIP: NEGATIVE
RBC # BLD AUTO: 5.07 MILLION/UL (ref 4.2–5.8)
SODIUM SERPL-SCNC: 138 MMOL/L (ref 135–146)
SP GR UR STRIP: 1.02 (ref 1–1.03)
T4 FREE SERPL-MCNC: 1 NG/DL (ref 0.8–1.8)
TRIGL SERPL-MCNC: 90 MG/DL
TSH SERPL-ACNC: 2.49 MIU/L (ref 0.4–4.5)
WBC # BLD AUTO: 4.2 THOUSAND/UL (ref 3.8–10.8)

## 2022-12-14 NOTE — PROGRESS NOTES
Subjective:       Patient ID: Marcus Parisi is a 67 y.o. male.    Chief Complaint: Hypertension, Hyperlipidemia, Thyroid Problem, and Follow-up    Patient presents today for six-month follow-up for chronic conditions including hypertension, hyperlipidemia, thyroid problem, and obesity.    Patient is stable on his current medication regiment. Glucose when fasting was elevated on recent lab work. He states he ate liberally the day before the blood work that was likely the cause. Patient states he will consciously monitor his sugar intake and limit this.  Patient is also seeing Dr. Hernandez for his prostate. She is monitoring closely his levels and has not referred him to oncology at this time.  Patient is obese with a BMI of 46.37    Hypertension  This is a chronic problem. The current episode started more than 1 month ago. The problem has been waxing and waning since onset. The problem is controlled. Pertinent negatives include no anxiety, blurred vision, orthopnea, palpitations, peripheral edema, PND, shortness of breath or sweats. There are no associated agents to hypertension. Risk factors for coronary artery disease include stress, obesity, dyslipidemia and male gender. Past treatments include calcium channel blockers and ACE inhibitors. The current treatment provides significant improvement. Compliance problems include exercise and diet.  There is no history of angina or kidney disease. Identifiable causes of hypertension include a thyroid problem. There is no history of chronic renal disease.   Thyroid Problem  Presents for follow-up visit. Patient reports no anxiety, cold intolerance, constipation, depressed mood, diarrhea, fatigue, heat intolerance, palpitations, weight gain or weight loss. The symptoms have been worsening. His past medical history is significant for hyperlipidemia.   Hyperlipidemia  This is a recurrent problem. The current episode started more than 1 month ago. The problem is controlled.  Recent lipid tests were reviewed and are variable. Exacerbating diseases include hypothyroidism and obesity. He has no history of chronic renal disease. Factors aggravating his hyperlipidemia include fatty foods. Pertinent negatives include no focal weakness, leg pain or shortness of breath. Current antihyperlipidemic treatment includes statins. The current treatment provides moderate improvement of lipids. Compliance problems include adherence to exercise and adherence to diet.  Risk factors for coronary artery disease include stress, dyslipidemia, male sex, hypertension and obesity.   Erectile Dysfunction  This is a chronic problem. The current episode started more than 1 year ago. The problem has been waxing and waning since onset. The nature of his difficulty is achieving erection and maintaining erection. He reports no anxiety or decreased libido. He reports his erection duration to be 1 to 5 minutes. Irritative symptoms do not include urgency. Pertinent negatives include no dysuria or hematuria. The symptoms are aggravated by stress. Past treatments include tadalafil. The treatment provided moderate relief. He has been using treatment for 1 to 2 years. He has had no adverse reactions caused by medications. Risk factors include hypertension.   Review of Systems   Constitutional:  Negative for activity change, appetite change, fatigue, unexpected weight change, weight gain and weight loss.        Obesity   HENT:  Negative for ear discharge, ear pain, hearing loss, postnasal drip, rhinorrhea, sinus pain, trouble swallowing and voice change.         Ear fullness, decreased hearing   Eyes:  Negative for blurred vision, photophobia, pain, discharge and visual disturbance.   Respiratory:  Negative for chest tightness, shortness of breath and wheezing.    Cardiovascular:  Negative for palpitations, orthopnea, leg swelling and PND.        Hypertension, hyperlipidemia, afib   Gastrointestinal:  Negative for blood in  stool, constipation and diarrhea.   Endocrine: Negative for cold intolerance, heat intolerance, polydipsia and polyuria.        Hypothyroid   Genitourinary:  Negative for decreased libido, difficulty urinating, dysuria, flank pain, hematuria and urgency.        Prostate cancer history, ED   Musculoskeletal:  Negative for arthralgias and gait problem.   Allergic/Immunologic: Negative for immunocompromised state.   Neurological:  Negative for dizziness and focal weakness.   Hematological:  Negative for adenopathy.   Psychiatric/Behavioral:  Negative for agitation, confusion, dysphoric mood, self-injury and suicidal ideas. The patient is not nervous/anxious.      Past Medical History:   Diagnosis Date    A-fib     Allergy     Anticoagulant long-term use     Arthritis     Asthma     as a child    Hyperlipidemia     Hypertension     Prostate cancer 2019    Sleep apnea     Use CPAP    Sleep apnea     uses C-PAP      Past Surgical History:   Procedure Laterality Date    CARPAL TUNNEL RELEASE Bilateral     CYSTOSCOPY N/A 1/28/2019    Procedure: CYSTOSCOPY;  Surgeon: Delmy Hernandez MD;  Location: [facility] OR;  Service: Urology;  Laterality: N/A;    CYSTOURETEROSCOPY WITH RETROGRADE PYELOGRAPHY AND INSERTION OF STENT INTO URETER Left 11/10/2021    Procedure: CYSTOURETEROSCOPY, WITH RETROGRADE PYELOGRAM AND URETERAL STENT INSERTION;  Surgeon: Delmy Hernandez MD;  Location: [facility] OR;  Service: Urology;  Laterality: Left;  please make 1st patient    INSERTION OF IMPLANTABLE LOOP RECORDER N/A 4/23/2021    Procedure: Insertion, Implantable Loop Recorder;  Surgeon: Almaz Rivas MD;  Location: [facility];  Service: Cardiology;  Laterality: N/A;    ROBOT-ASSISTED LAPAROSCOPIC PROSTATECTOMY USING DA LAVERN XI N/A 7/30/2020    Procedure: XI ROBOTIC PROSTATECTOMY;  Surgeon: Mejia Gold MD;  Location: [facility] OR;  Service: Urology;  Laterality: N/A;    TONSILLECTOMY      TRANSRECTAL BIOPSY OF PROSTATE WITH ULTRASOUND  GUIDANCE N/A 2019    Procedure: BIOPSY, PROSTATE, RECTAL APPROACH, WITH US GUIDANCE;  Surgeon: Delmy Hernandez MD;  Location: [facility] OR;  Service: Urology;  Laterality: N/A;       Family History   Problem Relation Age of Onset    Cancer Mother     Asthma Mother     Diabetes Mother     Heart disease Mother     Hypertension Mother     Hypertension Father     Crohn's disease Brother     Cancer Maternal Grandmother        Social History     Socioeconomic History    Marital status:    Tobacco Use    Smoking status: Former     Packs/day: 3.00     Years: 5.00     Pack years: 15.00     Types: Cigarettes     Quit date:      Years since quittin.9    Smokeless tobacco: Former     Types: Chew    Tobacco comments:     quit 40 yrs ago   Substance and Sexual Activity    Alcohol use: Not Currently     Comment: occasional    Drug use: No    Sexual activity: Yes     Partners: Female       Current Outpatient Medications   Medication Sig Dispense Refill    amlodipine-benazepril 5-20 mg (LOTREL) 5-20 mg per capsule Take 1 capsule by mouth once daily 90 capsule 1    apixaban (ELIQUIS) 5 mg Tab Take 1 tablet by mouth twice daily 180 tablet 1    atorvastatin (LIPITOR) 20 MG tablet TAKE 1 TABLET BY MOUTH ONCE DAILY IN THE EVENING 90 tablet 1    cyanocobalamin (VITAMIN B-12) 1000 MCG tablet Take 1,000 mcg by mouth once daily.       meloxicam (MOBIC) 15 MG tablet Take 1 tablet (15 mg total) by mouth daily as needed for Pain. 30 tablet 2    metoprolol succinate (TOPROL-XL) 50 MG 24 hr tablet Take 1 tablet (50 mg total) by mouth once daily. 90 tablet 1    OMEGA-3 ACID ETHYL ESTERS ORAL Take 1 capsule by mouth once daily.       SYNTHROID 50 mcg tablet       tadalafiL (CIALIS) 20 MG Tab Take 1 tablet (20 mg total) by mouth as needed (as needed prior to intercourse). 30 tablet 5    mupirocin (BACTROBAN) 2 % ointment Apply topically 2 (two) times daily. (Patient not taking: Reported on 12/15/2022) 15 g 0     No current  "facility-administered medications for this visit.       Review of patient's allergies indicates:  No Known Allergies  Objective:      Blood pressure 110/60, pulse 80, temperature 97.9 °F (36.6 °C), height 5' 9" (1.753 m), weight (!) 142.4 kg (314 lb), SpO2 97 %. Body mass index is 46.37 kg/m².   Physical Exam  Vitals and nursing note reviewed.   Constitutional:       General: He is not in acute distress.     Appearance: Normal appearance. He is well-developed. He is obese. He is not ill-appearing.   HENT:      Head: Normocephalic and atraumatic.      Right Ear: External ear normal. No middle ear effusion.      Left Ear: External ear normal.  No middle ear effusion.      Nose: Nose normal.      Right Turbinates: Not swollen.      Left Turbinates: Not swollen.      Mouth/Throat:      Lips: Pink.      Mouth: Mucous membranes are moist.      Pharynx: Uvula midline. No oropharyngeal exudate.   Eyes:      General: Lids are normal.      Extraocular Movements: Extraocular movements intact.      Conjunctiva/sclera: Conjunctivae normal.      Pupils: Pupils are equal, round, and reactive to light.   Cardiovascular:      Rate and Rhythm: Normal rate and regular rhythm.      Pulses: Normal pulses.      Heart sounds: Normal heart sounds, S1 normal and S2 normal. No murmur heard.  Pulmonary:      Effort: Pulmonary effort is normal. No respiratory distress.      Breath sounds: Normal breath sounds and air entry. No stridor. No decreased breath sounds, wheezing or rales.   Abdominal:      General: Bowel sounds are normal.      Palpations: Abdomen is soft.      Tenderness: There is no abdominal tenderness.   Musculoskeletal:         General: Normal range of motion.      Cervical back: Normal range of motion and neck supple.   Lymphadenopathy:      Cervical: No cervical adenopathy.   Skin:     General: Skin is warm and dry.      Findings: No rash.   Neurological:      General: No focal deficit present.      Mental Status: He is alert " and oriented to person, place, and time. Mental status is at baseline.   Psychiatric:         Mood and Affect: Mood normal.         Speech: Speech normal.         Behavior: Behavior normal.         Thought Content: Thought content normal.         Judgment: Judgment normal.           Assessment:       1. Essential (primary) hypertension    2. Acquired hypothyroidism    3. Paroxysmal atrial fibrillation    4. Dyslipidemia    5. Impaired fasting glucose    6. Vitamin D insufficiency    7. Need for influenza vaccination    8. Class 3 severe obesity due to excess calories with serious comorbidity and body mass index (BMI) of 45.0 to 49.9 in adult          Plan:       Marcus was seen today for hypertension, hyperlipidemia, thyroid problem and follow-up.    Diagnoses and all orders for this visit:    Essential (primary) hypertension  -     Hemoglobin A1C; Future  -     Microalbumin/Creatinine Ratio, Urine; Future  -     Comprehensive Metabolic Panel; Future  -     Urinalysis; Future  -     Hemoglobin A1C  -     Microalbumin/Creatinine Ratio, Urine  -     Comprehensive Metabolic Panel  -     Urinalysis    Lifestyle changes: Reduce the amount of salt in your diet; Lose weight; Avoid drinking too much alcohol; Exercise at least 30 minutes per day most days of the week.  Continue current medications and home BP monitoring.      Acquired hypothyroidism  -     TSH; Future  -     T4, Free; Future  -     TSH  -     T4, Free    Stable.  Labs normal  Continue current dose    Paroxysmal atrial fibrillation  -     CBC Auto Differential; Future  -     CBC Auto Differential  Stable.  Continue current anticoagulant therapy.    Dyslipidemia  -     Lipid Panel; Future  -     Lipid Panel  Stable.  Continue current medications    Limit red meat, butter, fried foods, cheese, and other foods that have a lot of saturated fat. Consume more: lean meats, fish, fruits, vegetables, whole grains, beans, lentils, and nuts.  Weight loss, and 30-45 min  of cardiovascular exercise daily.      Impaired fasting glucose  -     Hemoglobin A1C; Future  -     Urinalysis; Future  -     Hemoglobin A1C  -     Urinalysis   Discussed the following complications of DM Type 2: CAD, CVD, Retinopathy, Nephropathy, Neuropathy.    Discussed Target A1C Goal <7.0% and Target fasting blood sugars () before each meal.    Discussed Lifestyle Modifications: Low glycemic index diet, Exercise (30-45 min) daily, Weight loss    Vitamin D insufficiency  -     Vitamin D; Future  -     Vitamin D    Need for influenza vaccination  -     Influenza - Quadrivalent - High Dose (65+) (PF) (IM)  Completed    Class 3 severe obesity due to excess calories with serious comorbidity and body mass index (BMI) of 45.0 to 49.9 in adult  The patient is asked to make an attempt to improve diet and exercise patterns to aid in medical management of this problem.              Follow up with me in 6 months for chronic condition management with labs prior to office visit.

## 2022-12-15 ENCOUNTER — OFFICE VISIT (OUTPATIENT)
Dept: FAMILY MEDICINE | Facility: CLINIC | Age: 67
End: 2022-12-15
Payer: MEDICARE

## 2022-12-15 VITALS
DIASTOLIC BLOOD PRESSURE: 60 MMHG | HEART RATE: 80 BPM | WEIGHT: 314 LBS | SYSTOLIC BLOOD PRESSURE: 110 MMHG | TEMPERATURE: 98 F | BODY MASS INDEX: 46.51 KG/M2 | HEIGHT: 69 IN | OXYGEN SATURATION: 97 %

## 2022-12-15 DIAGNOSIS — I10 ESSENTIAL (PRIMARY) HYPERTENSION: Primary | ICD-10-CM

## 2022-12-15 DIAGNOSIS — E78.5 DYSLIPIDEMIA: ICD-10-CM

## 2022-12-15 DIAGNOSIS — E66.01 CLASS 3 SEVERE OBESITY DUE TO EXCESS CALORIES WITH SERIOUS COMORBIDITY AND BODY MASS INDEX (BMI) OF 45.0 TO 49.9 IN ADULT: ICD-10-CM

## 2022-12-15 DIAGNOSIS — E55.9 VITAMIN D INSUFFICIENCY: ICD-10-CM

## 2022-12-15 DIAGNOSIS — E03.9 ACQUIRED HYPOTHYROIDISM: ICD-10-CM

## 2022-12-15 DIAGNOSIS — R73.01 IMPAIRED FASTING GLUCOSE: ICD-10-CM

## 2022-12-15 DIAGNOSIS — Z23 NEED FOR INFLUENZA VACCINATION: ICD-10-CM

## 2022-12-15 DIAGNOSIS — I48.0 PAROXYSMAL ATRIAL FIBRILLATION: ICD-10-CM

## 2022-12-15 PROCEDURE — 4010F ACE/ARB THERAPY RXD/TAKEN: CPT | Mod: CPTII,S$GLB,, | Performed by: NURSE PRACTITIONER

## 2022-12-15 PROCEDURE — 4010F PR ACE/ARB THEARPY RXD/TAKEN: ICD-10-PCS | Mod: CPTII,S$GLB,, | Performed by: NURSE PRACTITIONER

## 2022-12-15 PROCEDURE — 1160F PR REVIEW ALL MEDS BY PRESCRIBER/CLIN PHARMACIST DOCUMENTED: ICD-10-PCS | Mod: CPTII,S$GLB,, | Performed by: NURSE PRACTITIONER

## 2022-12-15 PROCEDURE — 1159F PR MEDICATION LIST DOCUMENTED IN MEDICAL RECORD: ICD-10-PCS | Mod: CPTII,S$GLB,, | Performed by: NURSE PRACTITIONER

## 2022-12-15 PROCEDURE — 3008F BODY MASS INDEX DOCD: CPT | Mod: CPTII,S$GLB,, | Performed by: NURSE PRACTITIONER

## 2022-12-15 PROCEDURE — 1126F PR PAIN SEVERITY QUANTIFIED, NO PAIN PRESENT: ICD-10-PCS | Mod: CPTII,S$GLB,, | Performed by: NURSE PRACTITIONER

## 2022-12-15 PROCEDURE — 99214 PR OFFICE/OUTPT VISIT, EST, LEVL IV, 30-39 MIN: ICD-10-PCS | Mod: 25,S$GLB,, | Performed by: NURSE PRACTITIONER

## 2022-12-15 PROCEDURE — 3078F PR MOST RECENT DIASTOLIC BLOOD PRESSURE < 80 MM HG: ICD-10-PCS | Mod: CPTII,S$GLB,, | Performed by: NURSE PRACTITIONER

## 2022-12-15 PROCEDURE — G0008 ADMIN INFLUENZA VIRUS VAC: HCPCS | Mod: S$GLB,,, | Performed by: NURSE PRACTITIONER

## 2022-12-15 PROCEDURE — 1159F MED LIST DOCD IN RCRD: CPT | Mod: CPTII,S$GLB,, | Performed by: NURSE PRACTITIONER

## 2022-12-15 PROCEDURE — G0008 FLU VACCINE - QUADRIVALENT - HIGH DOSE (65+) PRESERVATIVE FREE IM: ICD-10-PCS | Mod: S$GLB,,, | Performed by: NURSE PRACTITIONER

## 2022-12-15 PROCEDURE — 3074F SYST BP LT 130 MM HG: CPT | Mod: CPTII,S$GLB,, | Performed by: NURSE PRACTITIONER

## 2022-12-15 PROCEDURE — 3288F FALL RISK ASSESSMENT DOCD: CPT | Mod: CPTII,S$GLB,, | Performed by: NURSE PRACTITIONER

## 2022-12-15 PROCEDURE — 3008F PR BODY MASS INDEX (BMI) DOCUMENTED: ICD-10-PCS | Mod: CPTII,S$GLB,, | Performed by: NURSE PRACTITIONER

## 2022-12-15 PROCEDURE — 1126F AMNT PAIN NOTED NONE PRSNT: CPT | Mod: CPTII,S$GLB,, | Performed by: NURSE PRACTITIONER

## 2022-12-15 PROCEDURE — 1101F PR PT FALLS ASSESS DOC 0-1 FALLS W/OUT INJ PAST YR: ICD-10-PCS | Mod: CPTII,S$GLB,, | Performed by: NURSE PRACTITIONER

## 2022-12-15 PROCEDURE — 90662 IIV NO PRSV INCREASED AG IM: CPT | Mod: S$GLB,,, | Performed by: NURSE PRACTITIONER

## 2022-12-15 PROCEDURE — 99214 OFFICE O/P EST MOD 30 MIN: CPT | Mod: 25,S$GLB,, | Performed by: NURSE PRACTITIONER

## 2022-12-15 PROCEDURE — 90662 FLU VACCINE - QUADRIVALENT - HIGH DOSE (65+) PRESERVATIVE FREE IM: ICD-10-PCS | Mod: S$GLB,,, | Performed by: NURSE PRACTITIONER

## 2022-12-15 PROCEDURE — 3078F DIAST BP <80 MM HG: CPT | Mod: CPTII,S$GLB,, | Performed by: NURSE PRACTITIONER

## 2022-12-15 PROCEDURE — 3044F PR MOST RECENT HEMOGLOBIN A1C LEVEL <7.0%: ICD-10-PCS | Mod: CPTII,S$GLB,, | Performed by: NURSE PRACTITIONER

## 2022-12-15 PROCEDURE — 1160F RVW MEDS BY RX/DR IN RCRD: CPT | Mod: CPTII,S$GLB,, | Performed by: NURSE PRACTITIONER

## 2022-12-15 PROCEDURE — 3288F PR FALLS RISK ASSESSMENT DOCUMENTED: ICD-10-PCS | Mod: CPTII,S$GLB,, | Performed by: NURSE PRACTITIONER

## 2022-12-15 PROCEDURE — 3074F PR MOST RECENT SYSTOLIC BLOOD PRESSURE < 130 MM HG: ICD-10-PCS | Mod: CPTII,S$GLB,, | Performed by: NURSE PRACTITIONER

## 2022-12-15 PROCEDURE — 3044F HG A1C LEVEL LT 7.0%: CPT | Mod: CPTII,S$GLB,, | Performed by: NURSE PRACTITIONER

## 2022-12-15 PROCEDURE — 1101F PT FALLS ASSESS-DOCD LE1/YR: CPT | Mod: CPTII,S$GLB,, | Performed by: NURSE PRACTITIONER

## 2023-01-10 DIAGNOSIS — N52.1 ERECTILE DYSFUNCTION DUE TO DISEASES CLASSIFIED ELSEWHERE: ICD-10-CM

## 2023-01-10 DIAGNOSIS — R79.89 HIGH THYROID STIMULATING HORMONE (TSH) LEVEL: Primary | ICD-10-CM

## 2023-01-10 DIAGNOSIS — E78.5 DYSLIPIDEMIA: ICD-10-CM

## 2023-01-10 DIAGNOSIS — I10 ESSENTIAL (PRIMARY) HYPERTENSION: ICD-10-CM

## 2023-01-11 RX ORDER — AMLODIPINE AND BENAZEPRIL HYDROCHLORIDE 5; 20 MG/1; MG/1
1 CAPSULE ORAL DAILY
Qty: 90 CAPSULE | Refills: 1 | Status: SHIPPED | OUTPATIENT
Start: 2023-01-11 | End: 2023-06-05

## 2023-01-11 RX ORDER — LEVOTHYROXINE SODIUM 50 UG/1
50 TABLET ORAL
Qty: 90 TABLET | Refills: 1 | Status: SHIPPED | OUTPATIENT
Start: 2023-01-11 | End: 2023-01-24

## 2023-01-11 RX ORDER — TADALAFIL 20 MG/1
20 TABLET ORAL
Qty: 30 TABLET | Refills: 5 | Status: SHIPPED | OUTPATIENT
Start: 2023-01-11 | End: 2023-01-17 | Stop reason: SDUPTHER

## 2023-01-11 RX ORDER — ATORVASTATIN CALCIUM 20 MG/1
20 TABLET, FILM COATED ORAL NIGHTLY
Qty: 90 TABLET | Refills: 1 | Status: SHIPPED | OUTPATIENT
Start: 2023-01-11 | End: 2023-06-05

## 2023-01-16 DIAGNOSIS — N52.1 ERECTILE DYSFUNCTION DUE TO DISEASES CLASSIFIED ELSEWHERE: ICD-10-CM

## 2023-01-17 RX ORDER — TADALAFIL 20 MG/1
20 TABLET ORAL
Qty: 30 TABLET | Refills: 5 | Status: SHIPPED | OUTPATIENT
Start: 2023-01-17 | End: 2024-03-21

## 2023-01-17 RX ORDER — TADALAFIL 20 MG/1
20 TABLET ORAL
Qty: 30 TABLET | Refills: 5 | OUTPATIENT
Start: 2023-01-17

## 2023-01-24 RX ORDER — LEVOTHYROXINE SODIUM 50 UG/1
50 TABLET ORAL
Qty: 30 TABLET | Refills: 11 | Status: SHIPPED | OUTPATIENT
Start: 2023-01-24 | End: 2023-03-13

## 2023-02-28 ENCOUNTER — HOSPITAL ENCOUNTER (OUTPATIENT)
Dept: RADIOLOGY | Facility: HOSPITAL | Age: 68
Discharge: HOME | End: 2023-02-28
Attending: UROLOGY
Payer: MEDICARE

## 2023-02-28 DIAGNOSIS — C61 PROSTATE CANCER: ICD-10-CM

## 2023-02-28 DIAGNOSIS — N20.0 NEPHROLITHIASIS: ICD-10-CM

## 2023-02-28 PROCEDURE — 74018 XR ABDOMEN AP 1 VIEW: ICD-10-PCS | Mod: 26,,, | Performed by: RADIOLOGY

## 2023-02-28 PROCEDURE — 76770 US EXAM ABDO BACK WALL COMP: CPT | Mod: 26,,, | Performed by: RADIOLOGY

## 2023-02-28 PROCEDURE — 74018 RADEX ABDOMEN 1 VIEW: CPT | Mod: TC,FY

## 2023-02-28 PROCEDURE — 76770 US EXAM ABDO BACK WALL COMP: CPT | Mod: TC

## 2023-02-28 PROCEDURE — 74018 RADEX ABDOMEN 1 VIEW: CPT | Mod: 26,,, | Performed by: RADIOLOGY

## 2023-02-28 PROCEDURE — 76770 US RETROPERITONEAL COMPLETE: ICD-10-PCS | Mod: 26,,, | Performed by: RADIOLOGY

## 2023-03-06 ENCOUNTER — OFFICE VISIT (OUTPATIENT)
Dept: UROLOGY | Facility: CLINIC | Age: 68
End: 2023-03-06
Payer: MEDICARE

## 2023-03-06 VITALS
HEIGHT: 68 IN | HEART RATE: 91 BPM | BODY MASS INDEX: 47.09 KG/M2 | DIASTOLIC BLOOD PRESSURE: 81 MMHG | WEIGHT: 310.75 LBS | SYSTOLIC BLOOD PRESSURE: 130 MMHG

## 2023-03-06 DIAGNOSIS — N20.0 NEPHROLITHIASIS: ICD-10-CM

## 2023-03-06 DIAGNOSIS — C61 PROSTATE CANCER: Primary | ICD-10-CM

## 2023-03-06 DIAGNOSIS — N39.3 SUI (STRESS URINARY INCONTINENCE), MALE: ICD-10-CM

## 2023-03-06 LAB
BILIRUBIN, UA POC OHS: NEGATIVE
BLOOD, UA POC OHS: ABNORMAL
CLARITY, UA POC OHS: CLEAR
COLOR, UA POC OHS: YELLOW
GLUCOSE, UA POC OHS: NEGATIVE
KETONES, UA POC OHS: NEGATIVE
LEUKOCYTES, UA POC OHS: NEGATIVE
NITRITE, UA POC OHS: NEGATIVE
PH, UA POC OHS: 6
PROTEIN, UA POC OHS: NEGATIVE
SPECIFIC GRAVITY, UA POC OHS: 1.02
UROBILINOGEN, UA POC OHS: 4

## 2023-03-06 PROCEDURE — 3075F SYST BP GE 130 - 139MM HG: CPT | Mod: CPTII,S$GLB,, | Performed by: UROLOGY

## 2023-03-06 PROCEDURE — 99214 PR OFFICE/OUTPT VISIT, EST, LEVL IV, 30-39 MIN: ICD-10-PCS | Mod: S$GLB,,, | Performed by: UROLOGY

## 2023-03-06 PROCEDURE — 1160F RVW MEDS BY RX/DR IN RCRD: CPT | Mod: CPTII,S$GLB,, | Performed by: UROLOGY

## 2023-03-06 PROCEDURE — 1159F MED LIST DOCD IN RCRD: CPT | Mod: CPTII,S$GLB,, | Performed by: UROLOGY

## 2023-03-06 PROCEDURE — 1126F PR PAIN SEVERITY QUANTIFIED, NO PAIN PRESENT: ICD-10-PCS | Mod: CPTII,S$GLB,, | Performed by: UROLOGY

## 2023-03-06 PROCEDURE — 3288F FALL RISK ASSESSMENT DOCD: CPT | Mod: CPTII,S$GLB,, | Performed by: UROLOGY

## 2023-03-06 PROCEDURE — 1160F PR REVIEW ALL MEDS BY PRESCRIBER/CLIN PHARMACIST DOCUMENTED: ICD-10-PCS | Mod: CPTII,S$GLB,, | Performed by: UROLOGY

## 2023-03-06 PROCEDURE — 99999 PR PBB SHADOW E&M-EST. PATIENT-LVL III: ICD-10-PCS | Mod: PBBFAC,,, | Performed by: UROLOGY

## 2023-03-06 PROCEDURE — 81003 POCT URINALYSIS(INSTRUMENT): ICD-10-PCS | Mod: QW,S$GLB,, | Performed by: UROLOGY

## 2023-03-06 PROCEDURE — 3079F DIAST BP 80-89 MM HG: CPT | Mod: CPTII,S$GLB,, | Performed by: UROLOGY

## 2023-03-06 PROCEDURE — 99214 OFFICE O/P EST MOD 30 MIN: CPT | Mod: S$GLB,,, | Performed by: UROLOGY

## 2023-03-06 PROCEDURE — 99999 PR PBB SHADOW E&M-EST. PATIENT-LVL III: CPT | Mod: PBBFAC,,, | Performed by: UROLOGY

## 2023-03-06 PROCEDURE — 3288F PR FALLS RISK ASSESSMENT DOCUMENTED: ICD-10-PCS | Mod: CPTII,S$GLB,, | Performed by: UROLOGY

## 2023-03-06 PROCEDURE — 81003 URINALYSIS AUTO W/O SCOPE: CPT | Mod: QW,S$GLB,, | Performed by: UROLOGY

## 2023-03-06 PROCEDURE — 3008F BODY MASS INDEX DOCD: CPT | Mod: CPTII,S$GLB,, | Performed by: UROLOGY

## 2023-03-06 PROCEDURE — 1159F PR MEDICATION LIST DOCUMENTED IN MEDICAL RECORD: ICD-10-PCS | Mod: CPTII,S$GLB,, | Performed by: UROLOGY

## 2023-03-06 PROCEDURE — 4010F PR ACE/ARB THEARPY RXD/TAKEN: ICD-10-PCS | Mod: CPTII,S$GLB,, | Performed by: UROLOGY

## 2023-03-06 PROCEDURE — 1101F PR PT FALLS ASSESS DOC 0-1 FALLS W/OUT INJ PAST YR: ICD-10-PCS | Mod: CPTII,S$GLB,, | Performed by: UROLOGY

## 2023-03-06 PROCEDURE — 1101F PT FALLS ASSESS-DOCD LE1/YR: CPT | Mod: CPTII,S$GLB,, | Performed by: UROLOGY

## 2023-03-06 PROCEDURE — 3075F PR MOST RECENT SYSTOLIC BLOOD PRESS GE 130-139MM HG: ICD-10-PCS | Mod: CPTII,S$GLB,, | Performed by: UROLOGY

## 2023-03-06 PROCEDURE — 1126F AMNT PAIN NOTED NONE PRSNT: CPT | Mod: CPTII,S$GLB,, | Performed by: UROLOGY

## 2023-03-06 PROCEDURE — 3079F PR MOST RECENT DIASTOLIC BLOOD PRESSURE 80-89 MM HG: ICD-10-PCS | Mod: CPTII,S$GLB,, | Performed by: UROLOGY

## 2023-03-06 PROCEDURE — 3008F PR BODY MASS INDEX (BMI) DOCUMENTED: ICD-10-PCS | Mod: CPTII,S$GLB,, | Performed by: UROLOGY

## 2023-03-06 PROCEDURE — 4010F ACE/ARB THERAPY RXD/TAKEN: CPT | Mod: CPTII,S$GLB,, | Performed by: UROLOGY

## 2023-03-06 NOTE — PROGRESS NOTES
[facility] H&P Note - [place]  Staff: MD Mary     Referring provider and please cc:   PCP: [facility]     Chief Complaint: prostate cancer     Subjective:        HPI: Marcus Parisi is a 64 y.o. male presents with  Prostate cancer, T2c Gl3+3, T2c psa 2.9, NxMx prostate nodules, B apex and MH.  He was initially seen by me in 2018 for rising psa.   He was found to have Gl 3+3 in 4/14 cores Jan 2019 and was referred due to insurance issues to .    took him for a RALP on 7/1/20. Gl 4+3 with teriary 5 in <5%. Tumor involves 20 to 25% of prostate. Right apical and Left posterior margins involved by cancer.  Also had a h/o stones 10+ years ago. Had a stent but leaked continuously   9/11/21 psa at [facility] <0.1  10/27/21: Saw francoise on 10/27/21 and was c/o gross hematuria. CTU showed mid to distal L ureteral 3mm stone. Pt wanted it removed.     Interval history 11/3/21  He is here in preop and says he hasn't had any hematuria in 3 to 4 days. He's had no flank pain in 3 weeks. He never received flomax ([facility]). He also has not passed stone.  Prostate cancer- still has some incontinence  He was discharged on flomax with a plan for a repeat ctrss     Interval history 11/10/21:  ctrss 11/8/21 showed L ureteral 4mm stone in same position with moderate hydro, no left renal stones. 2, 2mm stones in right kidney. Long bladder . Therefore here today for cystoscopy and ureteroscopy to extract stone as long as no infection beyond stone   psa is 0.03 (no previous super sensitive psa's to go by)  Still with LUIS ENRIQUE        Interval history 2/8/22:  11/10/21 cysto L urs and stone extraction. Stone was not radioopaque. Inflammation around stone. Found to have BNC. Left stent on strings. Stones mostly calcium oxalate.   1/25/22 psa up to 0.04  rbus 2/3/22: 8mm stone on L wo hydro. No stone on right  kub 2/3/22: no stones seen     Hasn't passed any  stones. Didn't notice much diffference after procedure  (no increased flow) and denies any slow flow. Denies any frequency or ugency. Still has LUIS ENRIQUE.     Interval history since last visit with me:  Uroflow results (date: 02/16/2022): Voiding time: 21.1s, Flow time: 17.2s, TTP flow: 9.3s, Peak flowrate: 24.1 mL/s, Average flowrate: 13.5mL/s, Intervals: 2, Voided volume: 233 mL, Pvr by bladder scan: 0mL . Pattern of curve: see uploaded image, reviewed by     5/17/122 0.04    5/24/22: He apparently is using cialis 20mg daily. Should not be doing this daily. Not interested in LAURA. Not a good candidate for inection. On eliquis.     Interval history 11/29/22:  Had a psa done on 11/22/22 and was 0.05. still urinating well with no issues.   Using cialis as needed now, not daily. Doing well on this.   Ua today with tr bld but no uti sx. No flank pain.     Interval history 3/6/23:  He returns today with a repeat PSA done on 02/28/2023, up to 0.07 now.  He also had a kidney ultrasound on 02/28/2023 showing 1 6 mm stone in each kidney, KUB (Xray of the abdomen to look for stones) 02/28/2023 showing no stones  Voided urine today shows trace blood.  Denies any flank pain or gross hematuria.  No back pain.  Stays active.on cialis as needed.   History of bladder neck contracture but denies any difficulty urinating.  Good flow.  He says that he has been having stress incontinence since his prostatectomy however his nighttime incontinence has improved significantly.  Incontinence most noticeable with strenuous activity.  Wears 1 depends or liner during the day.  nighttime depends now dry. Only urinates 3x during the day. Does do kegels.         psa history   2/28/23 0.07  11/22/22 0.05  5/24/22  pvr by scan: 11  5/17/22 0.04   2/8/22  pvr by scan: 0  1/25/22 0.04  11/8/21 0.03  9/11/21 <0.1   7/31/20 RALP Gl 4+3 with teriary 5 in <5%. Tumor involves 20 to 25% of prostate.   1/28/19  psa 2.9, trus vol: 40.5g, T2c (b palp nodules),  Gl 3+3 in RBM, LML, LONDONO, LTZ (4/14).  HGPIN in  RBL, RBM (2/14). ASAP in SHELLY (1/14)  01/14/19          2.9, %free 15.5, JAHAIRA: 20g, definite nodule on left apex and one on right apex.   10/9/18            JAHAIRA: 20g gland without any discrete masses, left side however more firm, no tenderness.  9/18/18            2.1  9/2017             1.2      Urine history: eliquis for afib. No tobacco hx. + stones.   3/6/23  Tr bld  5/24/22 Tr bld  02/8/22 neg  11/10/21 90%calcium oxalate/10% calcium phosphate  10/27/21 Ng, void: red, 3+bld, >100 rbc, cyt: neg.   6/19/21 coreen  1/28/19 Ng, void: tr wbc- no sx of uti  1/21/19 ng, void: neg  1/15/19 No cx, void: tr blood, cytology: negative  10/9/18            No cx, void: neg       REVIEW OF SYSTEMS:  General ROS: no fevers, no chills  Psychological ROS: no depression  Endocrine ROS: no heat or cold  Respiratory ROS: no SOB  Cardiovascular ROS: no CP  Gastrointestinal ROS: no abdominal pain, no constipation, non diarrhea, on BRBPR  Musculoskeletal ROS: no muscle pain  Neurological ROS: kapil headaches  Dermatological ROS: no rashes  HEENT: no glasses, no sinus   ROS: per HPI     Objective:      Vitals:    03/06/23 1210   BP: 130/81   Pulse: 91             Assessment:         Marcus Parisi is a 68 y.o. male with     1. Prostate cancer    2. Nephrolithiasis    3. LUIS ENRIQUE (stress urinary incontinence), male         Plan:     Continue to monitor diagnostic psa for H/o Prostate cancer, Gl 4+3 with teriary 5 in <5% s/p ralp in 7/2020 with + margins with now slowly rising psa.  . Very slowly rising. Now 0.07. diagnostic psa in 3months and f/u after.   Calculated PSADT but not really a good indicator with PSA<0.2  Could do a psma scan once psa >0.2  Could add ADT if psa continues to rise >0.5.   Rbus and kub in 6 months to 1 year (feb 2024) to monitor 2 small stones on right not seen on last rbus.. Ua today with tr bld. No pain.   Continue to use Cialis as needed.   Continue kegels regularly, 2x a  day, 10x in a row. Timed voiding.   Return sooner if having any difficulty urinating since he has h/o BNC. Last peak flow 24.     F/u in 3 months with diagnostic psa prior               Generally speaking, PSMA PET Scans using PSMA (18F-DCFPyL) have been shown to be effective at PSA levels above 0.2ng/mL. Also generally speaking, the higher the PSA level (when restaging patients), the more likely a PSMA PET Scan is to detect and identify recurrent prostate cancer.  psa diagnostic in 6 months and f/u after (standing orders in).   But if psa rising, will order prostate mri to eval for any local recurrence at sites of positive margins.

## 2023-03-06 NOTE — PATIENT INSTRUCTIONS
Marcus Parisi is a 68 y.o. male with     1. Prostate cancer    2. Nephrolithiasis    Malik,I, male     Plan:     Continue to monitor diagnostic psa for H/o Prostate cancer, Gl 4+3 with teriary 5 in <5% s/p ralp in 7/2020 with + margins with now slowly rising psa.  . Very slowly rising. Now 0.07. diagnostic psa in 3months and f/u after.   Calculated PSADT but not really a good indicator with PSA<0.2  Could do a psma scan once psa >0.2  Could add ADT if psa continues to rise >0.5.   Rbus and kub in 6 months to 1 year (feb 2024) to monitor 2 small stones on right not seen on last rbus.. Ua today with tr bld. No pain.   Continue to use Cialis as needed.   Continue kegels regularly, 2x a day, 10x in a row. Timed voiding.   Return sooner if having any difficulty urinating since he has h/o BNC. Last peak flow 24.     F/u in 3 months with diagnostic psa prior               Generally speaking, PSMA PET Scans using PSMA (18F-DCFPyL) have been shown to be effective at PSA levels above 0.2ng/mL. Also generally speaking, the higher the PSA level (when restaging patients), the more likely a PSMA PET Scan is to detect and identify recurrent prostate cancer.  psa diagnostic in 6 months and f/u after (standing orders in).   But if psa rising, will order prostate mri to eval for any local recurrence at sites of positive margins.

## 2023-06-02 DIAGNOSIS — E78.5 DYSLIPIDEMIA: ICD-10-CM

## 2023-06-02 DIAGNOSIS — I10 ESSENTIAL (PRIMARY) HYPERTENSION: ICD-10-CM

## 2023-06-03 LAB
25(OH)D3 SERPL-MCNC: 41 NG/ML (ref 30–100)
ALBUMIN SERPL-MCNC: 4.2 G/DL (ref 3.6–5.1)
ALBUMIN/CREAT UR: 4 MCG/MG CREAT
ALBUMIN/GLOB SERPL: 2.1 (CALC) (ref 1–2.5)
ALP SERPL-CCNC: 82 U/L (ref 35–144)
ALT SERPL-CCNC: 12 U/L (ref 9–46)
APPEARANCE UR: CLEAR
AST SERPL-CCNC: 9 U/L (ref 10–35)
BASOPHILS # BLD AUTO: 19 CELLS/UL (ref 0–200)
BASOPHILS NFR BLD AUTO: 0.2 %
BILIRUB SERPL-MCNC: 1.7 MG/DL (ref 0.2–1.2)
BILIRUB UR QL STRIP: NEGATIVE
BUN SERPL-MCNC: 17 MG/DL (ref 7–25)
BUN/CREAT SERPL: ABNORMAL (CALC) (ref 6–22)
CALCIUM SERPL-MCNC: 9.3 MG/DL (ref 8.6–10.3)
CHLORIDE SERPL-SCNC: 105 MMOL/L (ref 98–110)
CHOLEST SERPL-MCNC: 120 MG/DL
CHOLEST/HDLC SERPL: 3.6 (CALC)
CO2 SERPL-SCNC: 25 MMOL/L (ref 20–32)
COLOR UR: ABNORMAL
CREAT SERPL-MCNC: 0.82 MG/DL (ref 0.7–1.35)
CREAT UR-MCNC: 181 MG/DL (ref 20–320)
EGFR: 96 ML/MIN/1.73M2
EOSINOPHIL # BLD AUTO: 0 CELLS/UL (ref 15–500)
EOSINOPHIL NFR BLD AUTO: 0 %
ERYTHROCYTE [DISTWIDTH] IN BLOOD BY AUTOMATED COUNT: 13.6 % (ref 11–15)
GLOBULIN SER CALC-MCNC: 2 G/DL (CALC) (ref 1.9–3.7)
GLUCOSE SERPL-MCNC: 119 MG/DL (ref 65–99)
GLUCOSE UR QL STRIP: NEGATIVE
HBA1C MFR BLD: 5.7 % OF TOTAL HGB
HCT VFR BLD AUTO: 46.6 % (ref 38.5–50)
HDLC SERPL-MCNC: 33 MG/DL
HGB BLD-MCNC: 15.1 G/DL (ref 13.2–17.1)
HGB UR QL STRIP: NEGATIVE
KETONES UR QL STRIP: ABNORMAL
LDLC SERPL CALC-MCNC: 70 MG/DL (CALC)
LEUKOCYTE ESTERASE UR QL STRIP: NEGATIVE
LYMPHOCYTES # BLD AUTO: 743 CELLS/UL (ref 850–3900)
LYMPHOCYTES NFR BLD AUTO: 7.9 %
MCH RBC QN AUTO: 29.3 PG (ref 27–33)
MCHC RBC AUTO-ENTMCNC: 32.4 G/DL (ref 32–36)
MCV RBC AUTO: 90.3 FL (ref 80–100)
MICROALBUMIN UR-MCNC: 0.8 MG/DL
MONOCYTES # BLD AUTO: 667 CELLS/UL (ref 200–950)
MONOCYTES NFR BLD AUTO: 7.1 %
NEUTROPHILS # BLD AUTO: 7971 CELLS/UL (ref 1500–7800)
NEUTROPHILS NFR BLD AUTO: 84.8 %
NITRITE UR QL STRIP: NEGATIVE
NONHDLC SERPL-MCNC: 87 MG/DL (CALC)
PH UR STRIP: 6 [PH] (ref 5–8)
PLATELET # BLD AUTO: 186 THOUSAND/UL (ref 140–400)
PMV BLD REES-ECKER: 10.5 FL (ref 7.5–12.5)
POTASSIUM SERPL-SCNC: 4.5 MMOL/L (ref 3.5–5.3)
PROT SERPL-MCNC: 6.2 G/DL (ref 6.1–8.1)
PROT UR QL STRIP: NEGATIVE
RBC # BLD AUTO: 5.16 MILLION/UL (ref 4.2–5.8)
SODIUM SERPL-SCNC: 138 MMOL/L (ref 135–146)
SP GR UR STRIP: 1.03 (ref 1–1.03)
T4 FREE SERPL-MCNC: 1 NG/DL (ref 0.8–1.8)
TRIGL SERPL-MCNC: 89 MG/DL
TSH SERPL-ACNC: 1.39 MIU/L (ref 0.4–4.5)
WBC # BLD AUTO: 9.4 THOUSAND/UL (ref 3.8–10.8)

## 2023-06-05 RX ORDER — ATORVASTATIN CALCIUM 20 MG/1
TABLET, FILM COATED ORAL
Qty: 90 TABLET | Refills: 1 | Status: SHIPPED | OUTPATIENT
Start: 2023-06-05 | End: 2023-11-30 | Stop reason: SDUPTHER

## 2023-06-05 RX ORDER — AMLODIPINE AND BENAZEPRIL HYDROCHLORIDE 5; 20 MG/1; MG/1
CAPSULE ORAL
Qty: 90 CAPSULE | Refills: 1 | Status: SHIPPED | OUTPATIENT
Start: 2023-06-05 | End: 2023-11-30 | Stop reason: SDUPTHER

## 2023-06-06 ENCOUNTER — LAB VISIT (OUTPATIENT)
Dept: LAB | Facility: HOSPITAL | Age: 68
End: 2023-06-06
Attending: UROLOGY
Payer: MEDICARE

## 2023-06-06 DIAGNOSIS — C61 MALIGNANT NEOPLASM OF PROSTATE: ICD-10-CM

## 2023-06-06 DIAGNOSIS — N20.0 NEPHROLITHIASIS: ICD-10-CM

## 2023-06-06 LAB — COMPLEXED PSA SERPL-MCNC: 0.09 NG/ML (ref 0–4)

## 2023-06-06 PROCEDURE — 36415 COLL VENOUS BLD VENIPUNCTURE: CPT | Performed by: UROLOGY

## 2023-06-06 PROCEDURE — 84153 ASSAY OF PSA TOTAL: CPT | Performed by: UROLOGY

## 2023-06-20 ENCOUNTER — OFFICE VISIT (OUTPATIENT)
Dept: UROLOGY | Facility: CLINIC | Age: 68
End: 2023-06-20
Payer: MEDICARE

## 2023-06-20 ENCOUNTER — PATIENT MESSAGE (OUTPATIENT)
Dept: UROLOGY | Facility: CLINIC | Age: 68
End: 2023-06-20

## 2023-06-20 VITALS
SYSTOLIC BLOOD PRESSURE: 103 MMHG | BODY MASS INDEX: 43.1 KG/M2 | HEART RATE: 89 BPM | WEIGHT: 291 LBS | DIASTOLIC BLOOD PRESSURE: 69 MMHG | HEIGHT: 69 IN

## 2023-06-20 DIAGNOSIS — R31.29 MICROSCOPIC HEMATURIA: Primary | ICD-10-CM

## 2023-06-20 DIAGNOSIS — R31.29 MICROSCOPIC HEMATURIA: ICD-10-CM

## 2023-06-20 DIAGNOSIS — C61 PROSTATE CANCER: Primary | ICD-10-CM

## 2023-06-20 DIAGNOSIS — N20.0 NEPHROLITHIASIS: ICD-10-CM

## 2023-06-20 LAB
AMORPH CRY URNS QL MICRO: ABNORMAL
BACTERIA #/AREA URNS HPF: ABNORMAL /HPF
BILIRUBIN, UA POC OHS: ABNORMAL
BLOOD, UA POC OHS: ABNORMAL
CLARITY, UA POC OHS: CLEAR
COLOR, UA POC OHS: YELLOW
GLUCOSE, UA POC OHS: NEGATIVE
KETONES, UA POC OHS: ABNORMAL
LEUKOCYTES, UA POC OHS: NEGATIVE
MICROSCOPIC COMMENT: ABNORMAL
NITRITE, UA POC OHS: NEGATIVE
PH, UA POC OHS: 5.5
POC RESIDUAL URINE VOLUME: 0 ML (ref 0–100)
PROTEIN, UA POC OHS: 30
RBC #/AREA URNS HPF: 20 /HPF (ref 0–4)
SPECIFIC GRAVITY, UA POC OHS: >=1.03
UROBILINOGEN, UA POC OHS: 1

## 2023-06-20 PROCEDURE — 51798 US URINE CAPACITY MEASURE: CPT | Mod: S$GLB,,, | Performed by: UROLOGY

## 2023-06-20 PROCEDURE — 99999 PR PBB SHADOW E&M-EST. PATIENT-LVL III: CPT | Mod: PBBFAC,,, | Performed by: UROLOGY

## 2023-06-20 PROCEDURE — 1160F RVW MEDS BY RX/DR IN RCRD: CPT | Mod: CPTII,S$GLB,, | Performed by: UROLOGY

## 2023-06-20 PROCEDURE — 3061F NEG MICROALBUMINURIA REV: CPT | Mod: CPTII,S$GLB,, | Performed by: UROLOGY

## 2023-06-20 PROCEDURE — 4010F ACE/ARB THERAPY RXD/TAKEN: CPT | Mod: CPTII,S$GLB,, | Performed by: UROLOGY

## 2023-06-20 PROCEDURE — 3008F PR BODY MASS INDEX (BMI) DOCUMENTED: ICD-10-PCS | Mod: CPTII,S$GLB,, | Performed by: UROLOGY

## 2023-06-20 PROCEDURE — 3066F PR DOCUMENTATION OF TREATMENT FOR NEPHROPATHY: ICD-10-PCS | Mod: CPTII,S$GLB,, | Performed by: UROLOGY

## 2023-06-20 PROCEDURE — 3061F PR NEG MICROALBUMINURIA RESULT DOCUMENTED/REVIEW: ICD-10-PCS | Mod: CPTII,S$GLB,, | Performed by: UROLOGY

## 2023-06-20 PROCEDURE — 99214 OFFICE O/P EST MOD 30 MIN: CPT | Mod: S$GLB,,, | Performed by: UROLOGY

## 2023-06-20 PROCEDURE — 1159F PR MEDICATION LIST DOCUMENTED IN MEDICAL RECORD: ICD-10-PCS | Mod: CPTII,S$GLB,, | Performed by: UROLOGY

## 2023-06-20 PROCEDURE — 4010F PR ACE/ARB THEARPY RXD/TAKEN: ICD-10-PCS | Mod: CPTII,S$GLB,, | Performed by: UROLOGY

## 2023-06-20 PROCEDURE — 1101F PR PT FALLS ASSESS DOC 0-1 FALLS W/OUT INJ PAST YR: ICD-10-PCS | Mod: CPTII,S$GLB,, | Performed by: UROLOGY

## 2023-06-20 PROCEDURE — 3288F PR FALLS RISK ASSESSMENT DOCUMENTED: ICD-10-PCS | Mod: CPTII,S$GLB,, | Performed by: UROLOGY

## 2023-06-20 PROCEDURE — 1160F PR REVIEW ALL MEDS BY PRESCRIBER/CLIN PHARMACIST DOCUMENTED: ICD-10-PCS | Mod: CPTII,S$GLB,, | Performed by: UROLOGY

## 2023-06-20 PROCEDURE — 1126F AMNT PAIN NOTED NONE PRSNT: CPT | Mod: CPTII,S$GLB,, | Performed by: UROLOGY

## 2023-06-20 PROCEDURE — 81003 POCT URINALYSIS(INSTRUMENT): ICD-10-PCS | Mod: QW,S$GLB,, | Performed by: UROLOGY

## 2023-06-20 PROCEDURE — 3078F DIAST BP <80 MM HG: CPT | Mod: CPTII,S$GLB,, | Performed by: UROLOGY

## 2023-06-20 PROCEDURE — 99999 PR PBB SHADOW E&M-EST. PATIENT-LVL III: ICD-10-PCS | Mod: PBBFAC,,, | Performed by: UROLOGY

## 2023-06-20 PROCEDURE — 99214 PR OFFICE/OUTPT VISIT, EST, LEVL IV, 30-39 MIN: ICD-10-PCS | Mod: S$GLB,,, | Performed by: UROLOGY

## 2023-06-20 PROCEDURE — 51798 POCT BLADDER SCAN: ICD-10-PCS | Mod: S$GLB,,, | Performed by: UROLOGY

## 2023-06-20 PROCEDURE — 1159F MED LIST DOCD IN RCRD: CPT | Mod: CPTII,S$GLB,, | Performed by: UROLOGY

## 2023-06-20 PROCEDURE — 3074F SYST BP LT 130 MM HG: CPT | Mod: CPTII,S$GLB,, | Performed by: UROLOGY

## 2023-06-20 PROCEDURE — 1126F PR PAIN SEVERITY QUANTIFIED, NO PAIN PRESENT: ICD-10-PCS | Mod: CPTII,S$GLB,, | Performed by: UROLOGY

## 2023-06-20 PROCEDURE — 3078F PR MOST RECENT DIASTOLIC BLOOD PRESSURE < 80 MM HG: ICD-10-PCS | Mod: CPTII,S$GLB,, | Performed by: UROLOGY

## 2023-06-20 PROCEDURE — 3008F BODY MASS INDEX DOCD: CPT | Mod: CPTII,S$GLB,, | Performed by: UROLOGY

## 2023-06-20 PROCEDURE — 1101F PT FALLS ASSESS-DOCD LE1/YR: CPT | Mod: CPTII,S$GLB,, | Performed by: UROLOGY

## 2023-06-20 PROCEDURE — 81000 URINALYSIS NONAUTO W/SCOPE: CPT | Performed by: UROLOGY

## 2023-06-20 PROCEDURE — 3074F PR MOST RECENT SYSTOLIC BLOOD PRESSURE < 130 MM HG: ICD-10-PCS | Mod: CPTII,S$GLB,, | Performed by: UROLOGY

## 2023-06-20 PROCEDURE — 81003 URINALYSIS AUTO W/O SCOPE: CPT | Mod: QW,S$GLB,, | Performed by: UROLOGY

## 2023-06-20 PROCEDURE — 3044F PR MOST RECENT HEMOGLOBIN A1C LEVEL <7.0%: ICD-10-PCS | Mod: CPTII,S$GLB,, | Performed by: UROLOGY

## 2023-06-20 PROCEDURE — 3044F HG A1C LEVEL LT 7.0%: CPT | Mod: CPTII,S$GLB,, | Performed by: UROLOGY

## 2023-06-20 PROCEDURE — 3288F FALL RISK ASSESSMENT DOCD: CPT | Mod: CPTII,S$GLB,, | Performed by: UROLOGY

## 2023-06-20 PROCEDURE — 3066F NEPHROPATHY DOC TX: CPT | Mod: CPTII,S$GLB,, | Performed by: UROLOGY

## 2023-06-20 RX ORDER — TAMSULOSIN HYDROCHLORIDE 0.4 MG/1
0.4 CAPSULE ORAL NIGHTLY
Qty: 30 CAPSULE | Refills: 0 | Status: ON HOLD | OUTPATIENT
Start: 2023-06-20 | End: 2023-07-10 | Stop reason: SDUPTHER

## 2023-06-20 NOTE — PROGRESS NOTES
[facility] H&P Note - [place]  Staff: MD Mary     Referring provider and please cc:   PCP: [facility]     Chief Complaint: prostate cancer     Subjective:        HPI: Marcus Parisi is a 64 y.o. male presents with  Prostate cancer, T2c Gl3+3, T2c psa 2.9, NxMx prostate nodules, B apex and MH.  He was initially seen by me in 2018 for rising psa.   He was found to have Gl 3+3 in 4/14 cores Jan 2019 and was referred due to insurance issues to .    took him for a RALP on 7/1/20. Gl 4+3 with teriary 5 in <5%. Tumor involves 20 to 25% of prostate. Right apical and Left posterior margins involved by cancer.  Also had a h/o stones 10+ years ago. Had a stent but leaked continuously   9/11/21 psa at [facility] <0.1  10/27/21: Saw francoise on 10/27/21 and was c/o gross hematuria. CTU showed mid to distal L ureteral 3mm stone. Pt wanted it removed.     Interval history 11/3/21  He is here in preop and says he hasn't had any hematuria in 3 to 4 days. He's had no flank pain in 3 weeks. He never received flomax ([facility]). He also has not passed stone.  Prostate cancer- still has some incontinence  He was discharged on flomax with a plan for a repeat ctrss     Interval history 11/10/21:  ctrss 11/8/21 showed L ureteral 4mm stone in same position with moderate hydro, no left renal stones. 2, 2mm stones in right kidney. Long bladder . Therefore here today for cystoscopy and ureteroscopy to extract stone as long as no infection beyond stone   psa is 0.03 (no previous super sensitive psa's to go by)  Still with LUIS ENRIQUE        Interval history 2/8/22:  11/10/21 cysto L urs and stone extraction. Stone was not radioopaque. Inflammation around stone. Found to have BNC. Left stent on strings. Stones mostly calcium oxalate.   1/25/22 psa up to 0.04  rbus 2/3/22: 8mm stone on L wo hydro. No stone on right  kub 2/3/22: no stones seen     Hasn't passed any  stones. Didn't notice much diffference after procedure  (no increased flow) and denies any slow flow. Denies any frequency or ugency. Still has LUIS ENRIQUE.     Interval history since last visit with me:  Uroflow results (date: 02/16/2022): Voiding time: 21.1s, Flow time: 17.2s, TTP flow: 9.3s, Peak flowrate: 24.1 mL/s, Average flowrate: 13.5mL/s, Intervals: 2, Voided volume: 233 mL, Pvr by bladder scan: 0mL . Pattern of curve: see uploaded image, reviewed by     5/17/122 0.04    5/24/22: He apparently is using cialis 20mg daily. Should not be doing this daily. Not interested in LAURA. Not a good candidate for inection. On eliquis.     Interval history 11/29/22:  Had a psa done on 11/22/22 and was 0.05. still urinating well with no issues.   Using cialis as needed now, not daily. Doing well on this.   Ua today with tr bld but no uti sx. No flank pain.     Interval history 3/6/23:  He returns today with a repeat PSA done on 02/28/2023, up to 0.07 now.  He also had a kidney ultrasound on 02/28/2023 showing 1 6 mm stone in each kidney, KUB (Xray of the abdomen to look for stones) 02/28/2023 showing no stones  Voided urine today shows trace blood.  Denies any flank pain or gross hematuria.  No back pain.  Stays active.on cialis as needed.   History of bladder neck contracture but denies any difficulty urinating.  Good flow.  He says that he has been having stress incontinence since his prostatectomy however his nighttime incontinence has improved significantly.  Incontinence most noticeable with strenuous activity.  Wears 1 depends or liner during the day.  nighttime depends now dry. Only urinates 3x during the day. Does do kegels.         Interval history 6/20/23:  Here today to discuss his PSA.  We have been monitoring it since it is slowly rising.  PSA up to 0.09.  0.02 higher than 3 months ago.  Has not had any back pain.  Also of note his urine today shows large blood.  Last time it only showed trace blood.  He denies  any gross hematuria.  He is on Eliquis.  Denies any flank pain.  Plan was to get an x-ray in February.      psa history   2/28/23 0.07  11/22/22 0.05  5/24/22  pvr by scan: 11  5/17/22 0.04   2/8/22  pvr by scan: 0  1/25/22 0.04  11/8/21 0.03  9/11/21 <0.1   7/31/20 RALP Gl 4+3 with teriary 5 in <5%. Tumor involves 20 to 25% of prostate.   1/28/19  psa 2.9, trus vol: 40.5g, T2c (b palp nodules), Gl 3+3 in RBM, LML, L[place]ONO, LTZ (4/14).  HGPIN in  RBL, RBM (2/14). ASAP in [place]LLY (1/14)  01/14/19          2.9, %free 15.5, JAHAIRA: 20g, definite nodule on left apex and one on right apex.   10/9/18            JAHAIRA: 20g gland without any discrete masses, left side however more firm, no tenderness.  9/18/18            2.1  9/2017             1.2      Urine history: eliquis for afib. No tobacco hx. + stones.   6/20/23 Large bld  3/6/23  Tr bld  5/24/22 Tr bld  02/8/22 neg  11/10/21 90%calcium oxalate/10% calcium phosphate  10/27/21 Ng, void: red, 3+bld, >100 rbc, cyt: neg.   6/19/21 coreen  1/28/19 Ng, void: tr wbc- no sx of uti  1/21/19 ng, void: neg  1/15/19 No cx, void: tr blood, cytology: negative  10/9/18            No cx, void: neg       REVIEW OF SYSTEMS:  General ROS: no fevers, no chills  Psychological ROS: no depression  Endocrine ROS: no heat or cold  Respiratory ROS: no SOB  Cardiovascular ROS: no CP  Gastrointestinal ROS: no abdominal pain, no constipation, non diarrhea, on BRBPR  Musculoskeletal ROS: no muscle pain  Neurological ROS: kapil headaches  Dermatological ROS: no rashes  HEENT: no glasses, no sinus   ROS: per HPI     Objective:      Vitals:    06/20/23 1052   BP: 103/69   Pulse: 89             Assessment:         Marcus Parisi is a 68 y.o. male with     1. Prostate cancer    2. Nephrolithiasis    3. Microscopic hematuria         Plan:     Continue to monitor diagnostic psa for H/o Prostate cancer, Gl 4+3 with tertiary 5 in <5% s/p ralp in 7/2020 with + margins with now slowly rising psa.  . Very slowly rising. Now  0.09. diagnostic psa in 3months and f/u after.   Discussed checking psa every 3 vs 6 months but indicated with high risk prostate cancer. So we can continue every 3 for now.   Calculated PSADT but not really a good indicator with PSA<0.2  Could do a psma scan once psa >0.2  Could add ADT if psa continues to rise >0.5.   Urine with large blood today- no c/o flank pain. H/o stones.   Send for ua neil. If >3 rbc will order a ct urogram to eval blood but goal is to r/o stones  Can start flomax nightly for now in case passing stone.   If <3 rbc's may just rbus and kub early (soon or prior to next f/u in 3 months) instead of   Rbus and kub in 6 months to 1 year (feb 2024) to monitor 2 small stones on right not seen on last rbus.. Ua today with tr bld. No pain.   Continue to use Cialis as needed.   Continue kegels regularly, 2x a day, 10x in a row. Timed voiding.   Return sooner if having any difficulty urinating since he has h/o BNC. Last peak flow 24.     F/u in 3 months with diagnostic psa prior               Generally speaking, PSMA PET Scans using PSMA (18F-DCFPyL) have been shown to be effective at PSA levels above 0.2ng/mL. Also generally speaking, the higher the PSA level (when restaging patients), the more likely a PSMA PET Scan is to detect and identify recurrent prostate cancer.  psa diagnostic in 6 months and f/u after (standing orders in).   But if psa rising, will order prostate mri to eval for any local recurrence at sites of positive margins.         PSA doubling time info:  https://www.mdcalc.com/calc/27287/psa-doubling-time-psadt-calculator#evidence  https://jamanetwork.com/journals/kristin/fullarticle/983097  The length of time after surgery prior to biochemical recurrence was important in determining the risk of eventual distant failure for men with lower (5, 6, and 7) and men with high (8, 9, and 10) [place] scores (Figure 4). A similar observation was made previously by Rahul et al22 in a report  demonstrating that a high [place] score and advanced pathologic stage were important in determining the likelihood of local or distant failure. Using a cutoff of 10 months, PSADT provided further substratification for men with a Mar[place] score of less than 8. Men with rapid PSA level elevation (<2 years), a [place] score of 5 to 7, and a PSADT (>10 months) demonstrated a 76% probability of remaining free of metastatic disease for 5 years following initial PSA level elevation compared with men with a shorter PSADT (<10 months) who had only 35% chance of remaining free of metastatic disease for 5 years after biochemical recurrence. Although not as strong as [place] score, time of biochemical recurrence, and PSADT, the pathologic stage did contribute to the likelihood of distant metastasis (P=.01). The PSADT has been suggested by Kong et al13 as a useful predictor of the type of eventual recurrence after radical prostatectomy. They measured the PSADT for a group of 77 men with biochemical recurrence following radical retropubic prostatectomy and found that shorter PSADTs (<6 months) were more indicative of distant disease when compared with local recurrence.

## 2023-06-20 NOTE — PATIENT INSTRUCTIONS
1. Prostate cancer         Plan:       Continue to monitor diagnostic psa for H/o Prostate cancer, Gl 4+3 with tertiary 5 in <5% s/p ralp in 7/2020 with + margins with now slowly rising psa.  . Very slowly rising. Now 0.09. diagnostic psa in 3months and f/u after.   Discussed checking psa every 3 vs 6 months but indicated with high risk prostate cancer. So we can continue every 3 for now.   Calculated PSADT but not really a good indicator with PSA<0.2  Could do a psma scan once psa >0.2  Could add ADT if psa continues to rise >0.5.   Urine with large blood today- no c/o flank pain. H/o stones.   Send for ua neil. If >3 rbc will order a ct urogram to eval blood but goal is to r/o stones  Can start flomax nightly for now in case passing stone.   If <3 rbc's may just rbus and kub early (soon or prior to next f/u in 3 months) instead of   Rbus (renal bladder ultrasound) and kub (xray of abdomen)    in 6 months to 1 year (feb 2024) to monitor 2 small stones on right not seen on last rbus.. Ua today with tr bld. No pain.   Continue to use Cialis as needed.   Continue kegels regularly, 2x a day, 10x in a row. Timed voiding.   Return sooner if having any difficulty urinating since he has h/o BNC. Last peak flow 24.               Generally speaking, PSMA PET Scans using PSMA (18F-DCFPyL) have been shown to be effective at PSA levels above 0.2ng/mL. Also generally speaking, the higher the PSA level (when restaging patients), the more likely a PSMA PET Scan is to detect and identify recurrent prostate cancer.  psa diagnostic in 6 months and f/u after (standing orders in).   But if psa rising, will order prostate mri to eval for any local recurrence at sites of positive margins.         PSA doubling time info:  https://www.mdcalc.com/calc/96215/psa-doubling-time-psadt-calculator#evidence  https://jamanetwork.com/journals/kristin/fullarticle/404518  The length of time after surgery prior to biochemical recurrence was important in  determining the risk of eventual distant failure for men with lower (5, 6, and 7) and men with high (8, 9, and 10) Maricarmen scores (Figure 4). A similar observation was made previously by Rahul et al22 in a report demonstrating that a high Maricarmen score and advanced pathologic stage were important in determining the likelihood of local or distant failure. Using a cutoff of 10 months, PSADT provided further substratification for men with a [place] score of less than 8. Men with rapid PSA level elevation (<2 years), a Mar[place]armen score of 5 to 7, and a PSADT (>10 months) demonstrated a 76% probability of remaining free of metastatic disease for 5 years following initial PSA level elevation compared with men with a shorter PSADT (<10 months) who had only 35% chance of remaining free of metastatic disease for 5 years after biochemical recurrence. Although not as strong as [place] score, time of biochemical recurrence, and PSADT, the pathologic stage did contribute to the likelihood of distant metastasis (P=.01). The PSADT has been suggested by Kong et al13 as a useful predictor of the type of eventual recurrence after radical prostatectomy. They measured the PSADT for a group of 77 men with biochemical recurrence following radical retropubic prostatectomy and found that shorter PSADTs (<6 months) were more indicative of distant disease when compared with local recurrence.

## 2023-06-20 NOTE — TELEPHONE ENCOUNTER
Urine showed enough blood to proceed with workup  Ctu and cysto (last cytoloygy 2021)  Ctu soon (h/o stones), cr prior  Cysto on Monday July 24 or Monday august 7  Please put orders for the cysto

## 2023-06-22 ENCOUNTER — OFFICE VISIT (OUTPATIENT)
Dept: FAMILY MEDICINE | Facility: CLINIC | Age: 68
End: 2023-06-22
Payer: MEDICARE

## 2023-06-22 ENCOUNTER — TELEPHONE (OUTPATIENT)
Dept: UROLOGY | Facility: CLINIC | Age: 68
End: 2023-06-22
Payer: MEDICARE

## 2023-06-22 VITALS
HEART RATE: 82 BPM | WEIGHT: 294 LBS | BODY MASS INDEX: 43.55 KG/M2 | OXYGEN SATURATION: 97 % | TEMPERATURE: 98 F | DIASTOLIC BLOOD PRESSURE: 60 MMHG | SYSTOLIC BLOOD PRESSURE: 110 MMHG | HEIGHT: 69 IN

## 2023-06-22 DIAGNOSIS — E03.9 ACQUIRED HYPOTHYROIDISM: ICD-10-CM

## 2023-06-22 DIAGNOSIS — E78.5 DYSLIPIDEMIA: ICD-10-CM

## 2023-06-22 DIAGNOSIS — R73.01 IMPAIRED FASTING GLUCOSE: ICD-10-CM

## 2023-06-22 DIAGNOSIS — E66.01 CLASS 3 SEVERE OBESITY DUE TO EXCESS CALORIES WITH SERIOUS COMORBIDITY AND BODY MASS INDEX (BMI) OF 40.0 TO 44.9 IN ADULT: ICD-10-CM

## 2023-06-22 DIAGNOSIS — I48.0 PAROXYSMAL ATRIAL FIBRILLATION: ICD-10-CM

## 2023-06-22 DIAGNOSIS — I10 ESSENTIAL (PRIMARY) HYPERTENSION: Primary | ICD-10-CM

## 2023-06-22 DIAGNOSIS — E55.9 VITAMIN D INSUFFICIENCY: ICD-10-CM

## 2023-06-22 PROCEDURE — 3044F PR MOST RECENT HEMOGLOBIN A1C LEVEL <7.0%: ICD-10-PCS | Mod: CPTII,S$GLB,, | Performed by: NURSE PRACTITIONER

## 2023-06-22 PROCEDURE — 4010F ACE/ARB THERAPY RXD/TAKEN: CPT | Mod: CPTII,S$GLB,, | Performed by: NURSE PRACTITIONER

## 2023-06-22 PROCEDURE — 1101F PT FALLS ASSESS-DOCD LE1/YR: CPT | Mod: CPTII,S$GLB,, | Performed by: NURSE PRACTITIONER

## 2023-06-22 PROCEDURE — 99214 OFFICE O/P EST MOD 30 MIN: CPT | Mod: S$GLB,,, | Performed by: NURSE PRACTITIONER

## 2023-06-22 PROCEDURE — 99214 PR OFFICE/OUTPT VISIT, EST, LEVL IV, 30-39 MIN: ICD-10-PCS | Mod: S$GLB,,, | Performed by: NURSE PRACTITIONER

## 2023-06-22 PROCEDURE — 4010F PR ACE/ARB THEARPY RXD/TAKEN: ICD-10-PCS | Mod: CPTII,S$GLB,, | Performed by: NURSE PRACTITIONER

## 2023-06-22 PROCEDURE — 1159F PR MEDICATION LIST DOCUMENTED IN MEDICAL RECORD: ICD-10-PCS | Mod: CPTII,S$GLB,, | Performed by: NURSE PRACTITIONER

## 2023-06-22 PROCEDURE — 1160F RVW MEDS BY RX/DR IN RCRD: CPT | Mod: CPTII,S$GLB,, | Performed by: NURSE PRACTITIONER

## 2023-06-22 PROCEDURE — 1159F MED LIST DOCD IN RCRD: CPT | Mod: CPTII,S$GLB,, | Performed by: NURSE PRACTITIONER

## 2023-06-22 PROCEDURE — 3061F NEG MICROALBUMINURIA REV: CPT | Mod: CPTII,S$GLB,, | Performed by: NURSE PRACTITIONER

## 2023-06-22 PROCEDURE — 3288F PR FALLS RISK ASSESSMENT DOCUMENTED: ICD-10-PCS | Mod: CPTII,S$GLB,, | Performed by: NURSE PRACTITIONER

## 2023-06-22 PROCEDURE — 3074F SYST BP LT 130 MM HG: CPT | Mod: CPTII,S$GLB,, | Performed by: NURSE PRACTITIONER

## 2023-06-22 PROCEDURE — 3078F DIAST BP <80 MM HG: CPT | Mod: CPTII,S$GLB,, | Performed by: NURSE PRACTITIONER

## 2023-06-22 PROCEDURE — 1126F PR PAIN SEVERITY QUANTIFIED, NO PAIN PRESENT: ICD-10-PCS | Mod: CPTII,S$GLB,, | Performed by: NURSE PRACTITIONER

## 2023-06-22 PROCEDURE — 3288F FALL RISK ASSESSMENT DOCD: CPT | Mod: CPTII,S$GLB,, | Performed by: NURSE PRACTITIONER

## 2023-06-22 PROCEDURE — 3008F PR BODY MASS INDEX (BMI) DOCUMENTED: ICD-10-PCS | Mod: CPTII,S$GLB,, | Performed by: NURSE PRACTITIONER

## 2023-06-22 PROCEDURE — 3078F PR MOST RECENT DIASTOLIC BLOOD PRESSURE < 80 MM HG: ICD-10-PCS | Mod: CPTII,S$GLB,, | Performed by: NURSE PRACTITIONER

## 2023-06-22 PROCEDURE — 1126F AMNT PAIN NOTED NONE PRSNT: CPT | Mod: CPTII,S$GLB,, | Performed by: NURSE PRACTITIONER

## 2023-06-22 PROCEDURE — 3061F PR NEG MICROALBUMINURIA RESULT DOCUMENTED/REVIEW: ICD-10-PCS | Mod: CPTII,S$GLB,, | Performed by: NURSE PRACTITIONER

## 2023-06-22 PROCEDURE — 3008F BODY MASS INDEX DOCD: CPT | Mod: CPTII,S$GLB,, | Performed by: NURSE PRACTITIONER

## 2023-06-22 PROCEDURE — 3074F PR MOST RECENT SYSTOLIC BLOOD PRESSURE < 130 MM HG: ICD-10-PCS | Mod: CPTII,S$GLB,, | Performed by: NURSE PRACTITIONER

## 2023-06-22 PROCEDURE — 3044F HG A1C LEVEL LT 7.0%: CPT | Mod: CPTII,S$GLB,, | Performed by: NURSE PRACTITIONER

## 2023-06-22 PROCEDURE — 1101F PR PT FALLS ASSESS DOC 0-1 FALLS W/OUT INJ PAST YR: ICD-10-PCS | Mod: CPTII,S$GLB,, | Performed by: NURSE PRACTITIONER

## 2023-06-22 PROCEDURE — 1160F PR REVIEW ALL MEDS BY PRESCRIBER/CLIN PHARMACIST DOCUMENTED: ICD-10-PCS | Mod: CPTII,S$GLB,, | Performed by: NURSE PRACTITIONER

## 2023-06-22 PROCEDURE — 3066F NEPHROPATHY DOC TX: CPT | Mod: CPTII,S$GLB,, | Performed by: NURSE PRACTITIONER

## 2023-06-22 PROCEDURE — 3066F PR DOCUMENTATION OF TREATMENT FOR NEPHROPATHY: ICD-10-PCS | Mod: CPTII,S$GLB,, | Performed by: NURSE PRACTITIONER

## 2023-06-22 NOTE — PROGRESS NOTES
Subjective:       Patient ID: Marcus Parisi is a 68 y.o. male.    Chief Complaint: Hypertension, Thyroid Problem, Vitamin D Deficiency, and Follow-up    Patient presents today for six-month follow-up for chronic conditions including hypertension, hyperlipidemia, thyroid problem, and obesity.    Have been received and reviewed.  Patient's medications are being well tolerated.  He has no complaints today.  He is obese with a BMI of 43.42      Hypertension  This is a chronic problem. The current episode started more than 1 month ago. The problem has been waxing and waning since onset. The problem is controlled. Pertinent negatives include no anxiety, blurred vision, orthopnea, palpitations, peripheral edema, PND, shortness of breath or sweats. There are no associated agents to hypertension. Risk factors for coronary artery disease include stress, obesity, dyslipidemia and male gender. Past treatments include calcium channel blockers and ACE inhibitors. The current treatment provides significant improvement. Compliance problems include exercise and diet.  There is no history of angina or kidney disease. Identifiable causes of hypertension include a thyroid problem. There is no history of chronic renal disease.   Thyroid Problem  Presents for follow-up visit. Patient reports no anxiety, cold intolerance, constipation, depressed mood, diarrhea, fatigue, heat intolerance, palpitations, weight gain or weight loss. The symptoms have been worsening. His past medical history is significant for hyperlipidemia.   Hyperlipidemia  This is a recurrent problem. The current episode started more than 1 month ago. The problem is controlled. Recent lipid tests were reviewed and are variable. Exacerbating diseases include hypothyroidism and obesity. He has no history of chronic renal disease. Factors aggravating his hyperlipidemia include fatty foods. Pertinent negatives include no focal weakness, leg pain or shortness of breath. Current  antihyperlipidemic treatment includes statins. The current treatment provides moderate improvement of lipids. Compliance problems include adherence to exercise and adherence to diet.  Risk factors for coronary artery disease include stress, dyslipidemia, male sex, hypertension and obesity.   Erectile Dysfunction  This is a chronic problem. The current episode started more than 1 year ago. The problem has been waxing and waning since onset. The nature of his difficulty is achieving erection and maintaining erection. He reports no anxiety or decreased libido. He reports his erection duration to be 1 to 5 minutes. Irritative symptoms do not include urgency. Pertinent negatives include no dysuria or hematuria. The symptoms are aggravated by stress. Past treatments include tadalafil. The treatment provided moderate relief. He has been using treatment for 1 to 2 years. He has had no adverse reactions caused by medications. Risk factors include hypertension.   Review of Systems   Constitutional:  Negative for activity change, appetite change, fatigue, unexpected weight change, weight gain and weight loss.        Obesity   HENT:  Negative for ear discharge, ear pain, hearing loss, postnasal drip, rhinorrhea, sinus pain, trouble swallowing and voice change.         Ear fullness, decreased hearing   Eyes:  Negative for blurred vision, photophobia, pain, discharge and visual disturbance.   Respiratory:  Negative for chest tightness, shortness of breath and wheezing.    Cardiovascular:  Negative for palpitations, orthopnea, leg swelling and PND.        Hypertension, hyperlipidemia, afib   Gastrointestinal:  Negative for blood in stool, constipation and diarrhea.   Endocrine: Negative for cold intolerance, heat intolerance, polydipsia and polyuria.        Hypothyroid   Genitourinary:  Negative for decreased libido, difficulty urinating, dysuria, flank pain, hematuria and urgency.        Prostate cancer history, ED    Musculoskeletal:  Negative for arthralgias and gait problem.   Allergic/Immunologic: Negative for immunocompromised state.   Neurological:  Negative for dizziness and focal weakness.   Hematological:  Negative for adenopathy.   Psychiatric/Behavioral:  Negative for agitation, confusion, dysphoric mood, self-injury and suicidal ideas. The patient is not nervous/anxious.      Past Medical History:   Diagnosis Date    A-fib     Allergy     Anticoagulant long-term use     Arthritis     Asthma     as a child    Hyperlipidemia     Hypertension     Prostate cancer 2019    Sleep apnea     Use CPAP    Sleep apnea     uses C-PAP      Past Surgical History:   Procedure Laterality Date    CARPAL TUNNEL RELEASE Bilateral     CYSTOSCOPY N/A 1/28/2019    Procedure: CYSTOSCOPY;  Surgeon: Delmy Hernandez MD;  Location: [facility] OR;  Service: Urology;  Laterality: N/A;    CYSTOURETEROSCOPY WITH RETROGRADE PYELOGRAPHY AND INSERTION OF STENT INTO URETER Left 11/10/2021    Procedure: CYSTOURETEROSCOPY, WITH RETROGRADE PYELOGRAM AND URETERAL STENT INSERTION;  Surgeon: Delmy Hernandez MD;  Location: [facility] OR;  Service: Urology;  Laterality: Left;  please make 1st patient    INSERTION OF IMPLANTABLE LOOP RECORDER N/A 4/23/2021    Procedure: Insertion, Implantable Loop Recorder;  Surgeon: Almaz Rivas MD;  Location: [facility] CATH;  Service: Cardiology;  Laterality: N/A;    ROBOT-ASSISTED LAPAROSCOPIC PROSTATECTOMY USING DA LAVERN XI N/A 7/30/2020    Procedure: XI ROBOTIC PROSTATECTOMY;  Surgeon: Mejia Gold MD;  Location: [facility] OR;  Service: Urology;  Laterality: N/A;    TONSILLECTOMY      TRANSRECTAL BIOPSY OF PROSTATE WITH ULTRASOUND GUIDANCE N/A 1/28/2019    Procedure: BIOPSY, PROSTATE, RECTAL APPROACH, WITH US GUIDANCE;  Surgeon: Delmy Hernandez MD;  Location: [facility] OR;  Service: Urology;  Laterality: N/A;       Family History   Problem Relation Age of Onset    Cancer Mother     Asthma Mother     Diabetes  "Mother     Heart disease Mother     Hypertension Mother     Hypertension Father     Crohn's disease Brother     Cancer Maternal Grandmother        Social History     Socioeconomic History    Marital status:    Tobacco Use    Smoking status: Former     Packs/day: 3.00     Years: 5.00     Pack years: 15.00     Types: Cigarettes     Quit date:      Years since quittin.5    Smokeless tobacco: Former     Types: Chew    Tobacco comments:     quit 40 yrs ago   Substance and Sexual Activity    Alcohol use: Not Currently     Comment: occasional    Drug use: No    Sexual activity: Yes     Partners: Female       Current Outpatient Medications   Medication Sig Dispense Refill    amlodipine-benazepril 5-20 mg (LOTREL) 5-20 mg per capsule TAKE 1 CAPSULE EVERY DAY 90 capsule 1    atorvastatin (LIPITOR) 20 MG tablet TAKE 1 TABLET EVERY EVENING 90 tablet 1    cyanocobalamin (VITAMIN B-12) 1000 MCG tablet Take 1,000 mcg by mouth once daily.       ELIQUIS 5 mg Tab TAKE 1 TABLET TWICE DAILY 180 tablet 1    levothyroxine (SYNTHROID) 50 MCG tablet TAKE 1 TABLET BEFORE BREAKFAST 90 tablet 3    meloxicam (MOBIC) 15 MG tablet Take 1 tablet (15 mg total) by mouth daily as needed for Pain. 30 tablet 2    metoprolol succinate (TOPROL-XL) 50 MG 24 hr tablet Take 1 tablet (50 mg total) by mouth once daily. 90 tablet 1    OMEGA-3 ACID ETHYL ESTERS ORAL Take 1 capsule by mouth once daily.       tadalafiL (CIALIS) 20 MG Tab Take 1 tablet (20 mg total) by mouth as needed (as needed prior to intercourse). 30 tablet 5    tamsulosin (FLOMAX) 0.4 mg Cap Take 1 capsule (0.4 mg total) by mouth every evening. Take nightly for stent/stone 30 capsule 0     No current facility-administered medications for this visit.       Review of patient's allergies indicates:  No Known Allergies  Objective:      Blood pressure 110/60, pulse 82, temperature 98.2 °F (36.8 °C), height 5' 9" (1.753 m), weight 133.4 kg (294 lb), SpO2 97 %. Body mass index is " 43.42 kg/m².   Physical Exam  Vitals and nursing note reviewed.   Constitutional:       General: He is not in acute distress.     Appearance: Normal appearance. He is well-developed. He is obese. He is not ill-appearing.   HENT:      Head: Normocephalic and atraumatic.      Right Ear: External ear normal. No middle ear effusion.      Left Ear: External ear normal.  No middle ear effusion.      Nose: Nose normal.      Right Turbinates: Not swollen.      Left Turbinates: Not swollen.      Mouth/Throat:      Lips: Pink.      Mouth: Mucous membranes are moist.      Pharynx: Uvula midline. No oropharyngeal exudate.   Eyes:      General: Lids are normal.      Extraocular Movements: Extraocular movements intact.      Conjunctiva/sclera: Conjunctivae normal.      Pupils: Pupils are equal, round, and reactive to light.   Cardiovascular:      Rate and Rhythm: Normal rate and regular rhythm.      Pulses: Normal pulses.      Heart sounds: Normal heart sounds, S1 normal and S2 normal. No murmur heard.  Pulmonary:      Effort: Pulmonary effort is normal. No respiratory distress.      Breath sounds: Normal breath sounds and air entry. No stridor. No decreased breath sounds, wheezing or rales.   Abdominal:      General: Bowel sounds are normal.      Palpations: Abdomen is soft.      Tenderness: There is no abdominal tenderness.   Musculoskeletal:         General: Normal range of motion.      Cervical back: Normal range of motion and neck supple.   Lymphadenopathy:      Cervical: No cervical adenopathy.   Skin:     General: Skin is warm and dry.      Findings: No rash.   Neurological:      General: No focal deficit present.      Mental Status: He is alert and oriented to person, place, and time. Mental status is at baseline.   Psychiatric:         Mood and Affect: Mood normal.         Speech: Speech normal.         Behavior: Behavior normal.         Thought Content: Thought content normal.         Judgment: Judgment normal.            Assessment:       1. Essential (primary) hypertension    2. Acquired hypothyroidism    3. Paroxysmal atrial fibrillation    4. Dyslipidemia    5. Impaired fasting glucose    6. Vitamin D insufficiency    7. Class 3 severe obesity due to excess calories with serious comorbidity and body mass index (BMI) of 40.0 to 44.9 in adult            Plan:       Marcus was seen today for hypertension, thyroid problem, vitamin d deficiency and follow-up.    Diagnoses and all orders for this visit:    Essential (primary) hypertension  -     Microalbumin/Creatinine Ratio, Urine; Future  -     CBC Auto Differential; Future  -     Comprehensive Metabolic Panel; Future  -     Urinalysis; Future  -     Microalbumin/Creatinine Ratio, Urine  -     CBC Auto Differential  -     Comprehensive Metabolic Panel  -     Urinalysis    Lifestyle changes: Reduce the amount of salt in your diet; Lose weight; Avoid drinking too much alcohol; Exercise at least 30 minutes per day most days of the week.  Continue current medications and home BP monitoring.      Acquired hypothyroidism  -     TSH; Future  -     T4, Free; Future  -     TSH  -     T4, Free    Stable  Continue current dose    Paroxysmal atrial fibrillation  -     CBC Auto Differential; Future  -     Comprehensive Metabolic Panel; Future  -     CBC Auto Differential  -     Comprehensive Metabolic Panel    Dyslipidemia  -     Lipid Panel; Future  -     Lipid Panel  Limit red meat, butter, fried foods, cheese, and other foods that have a lot of saturated fat. Consume more: lean meats, fish, fruits, vegetables, whole grains, beans, lentils, and nuts.  Weight loss, and 30-45 min of cardiovascular exercise daily.      Impaired fasting glucose  -     Hemoglobin A1C; Future  -     Microalbumin/Creatinine Ratio, Urine; Future  -     Hemoglobin A1C  -     Microalbumin/Creatinine Ratio, Urine     Discussed the following complications of DM Type 2: CAD, CVD, Retinopathy, Nephropathy,  Neuropathy.    Discussed Target A1C Goal <7.0% and Target fasting blood sugars () before each meal.    Discussed Lifestyle Modifications: Low glycemic index diet, Exercise (30-45 min) daily, Weight loss    Vitamin D insufficiency  -     Vitamin D; Future  -     Vitamin D    Continue current dose of vitamin d    Class 3 severe obesity due to excess calories with serious comorbidity and body mass index (BMI) of 40.0 to 44.9 in adult  The patient is asked to make an attempt to improve diet and exercise patterns to aid in medical management of this problem.      Follow up in 6 months with labs prior to office visit for chronic condition management.

## 2023-06-22 NOTE — TELEPHONE ENCOUNTER
Phoned patient spoke with patient's wife informed her portal message sent for patient to review. Wife verbally voiced understanding.

## 2023-06-24 ENCOUNTER — PATIENT MESSAGE (OUTPATIENT)
Dept: UROLOGY | Facility: CLINIC | Age: 68
End: 2023-06-24
Payer: MEDICARE

## 2023-06-26 ENCOUNTER — TELEPHONE (OUTPATIENT)
Dept: UROLOGY | Facility: CLINIC | Age: 68
End: 2023-06-26
Payer: MEDICARE

## 2023-06-26 ENCOUNTER — PATIENT MESSAGE (OUTPATIENT)
Dept: UROLOGY | Facility: CLINIC | Age: 68
End: 2023-06-26
Payer: MEDICARE

## 2023-06-26 DIAGNOSIS — R31.29 MICROHEMATURIA: ICD-10-CM

## 2023-06-26 DIAGNOSIS — R31.29 MICROSCOPIC HEMATURIA: Primary | ICD-10-CM

## 2023-06-26 NOTE — TELEPHONE ENCOUNTER
----- Message from Faviola Burr sent at 6/26/2023  9:39 AM CDT -----  Contact: pt  Type: Needs Medical Advice  Who Called:  pt  Best Call Back Number: [phone]    Additional Information:Pt is calling needs to speak with someone in the office prescription haven't been put in yet and situation is getting worst.Please call back and advise.

## 2023-06-26 NOTE — TELEPHONE ENCOUNTER
Spoke with patient's wife she states patient is experiencing blood urine and burning with urination. Per  schedule ct sooner and have patient give a urine sample. Urine scheduled for tomorrow and ct scheduled for 7/1. Wife verbally voiced understanding.

## 2023-06-27 ENCOUNTER — CLINICAL SUPPORT (OUTPATIENT)
Dept: UROLOGY | Facility: CLINIC | Age: 68
End: 2023-06-27
Payer: MEDICARE

## 2023-06-27 DIAGNOSIS — R31.29 MICROHEMATURIA: Primary | ICD-10-CM

## 2023-06-27 DIAGNOSIS — I48.0 PAROXYSMAL ATRIAL FIBRILLATION: ICD-10-CM

## 2023-06-27 LAB
BACTERIA #/AREA URNS HPF: ABNORMAL /HPF
BILIRUB UR QL STRIP: NEGATIVE
CLARITY UR: ABNORMAL
COLOR UR: ABNORMAL
GLUCOSE UR QL STRIP: NEGATIVE
HGB UR QL STRIP: ABNORMAL
HYALINE CASTS #/AREA URNS LPF: 0 /LPF
KETONES UR QL STRIP: NEGATIVE
LEUKOCYTE ESTERASE UR QL STRIP: ABNORMAL
MICROSCOPIC COMMENT: ABNORMAL
NITRITE UR QL STRIP: POSITIVE
PH UR STRIP: 7 [PH] (ref 5–8)
PROT UR QL STRIP: ABNORMAL
RBC #/AREA URNS HPF: 20 /HPF (ref 0–4)
SP GR UR STRIP: 1.02 (ref 1–1.03)
URN SPEC COLLECT METH UR: ABNORMAL
UROBILINOGEN UR STRIP-ACNC: 1 EU/DL
WBC #/AREA URNS HPF: >100 /HPF (ref 0–5)

## 2023-06-27 PROCEDURE — 88112 CYTOPATH CELL ENHANCE TECH: CPT | Mod: 26,,, | Performed by: PATHOLOGY

## 2023-06-27 PROCEDURE — 99499 UNLISTED E&M SERVICE: CPT | Mod: S$GLB,,, | Performed by: UROLOGY

## 2023-06-27 PROCEDURE — 87086 URINE CULTURE/COLONY COUNT: CPT | Performed by: UROLOGY

## 2023-06-27 PROCEDURE — 81000 URINALYSIS NONAUTO W/SCOPE: CPT | Performed by: UROLOGY

## 2023-06-27 PROCEDURE — 87186 SC STD MICRODIL/AGAR DIL: CPT | Performed by: UROLOGY

## 2023-06-27 PROCEDURE — 87077 CULTURE AEROBIC IDENTIFY: CPT | Performed by: UROLOGY

## 2023-06-27 PROCEDURE — 88112 CYTOPATH CELL ENHANCE TECH: CPT | Performed by: PATHOLOGY

## 2023-06-27 PROCEDURE — 87088 URINE BACTERIA CULTURE: CPT | Performed by: UROLOGY

## 2023-06-27 PROCEDURE — 99499 NO LOS: ICD-10-PCS | Mod: S$GLB,,, | Performed by: UROLOGY

## 2023-06-27 PROCEDURE — 88112 PR  CYTOPATH, CELL ENHANCE TECH: ICD-10-PCS | Mod: 26,,, | Performed by: PATHOLOGY

## 2023-06-27 RX ORDER — CIPROFLOXACIN 500 MG/1
500 TABLET ORAL EVERY 12 HOURS
Qty: 20 TABLET | Refills: 0 | Status: SHIPPED | OUTPATIENT
Start: 2023-06-27 | End: 2023-07-07

## 2023-06-27 NOTE — PROGRESS NOTES
Patient arrived in office today with complaints of uti symptoms.   Patient ambulated to restroom provided a clean catch urine sample.  Urine sent for u/a, urine culture and urine cytology.

## 2023-06-27 NOTE — PROGRESS NOTES
Sending in cipro 500mg twice a day to take for 10 days   If he has flank pain or a ct ends up showing obstructing stone when he has done on 7/1 (he will see resutls in my [facility]) he should just go to er for a possible stent by on call urologist especially if he has a fever  We will not be able to review results before 7/5  No one avaialble to review over weekend

## 2023-06-28 LAB
FINAL PATHOLOGIC DIAGNOSIS: NORMAL
Lab: NORMAL

## 2023-06-29 LAB — BACTERIA UR CULT: ABNORMAL

## 2023-07-01 ENCOUNTER — HOSPITAL ENCOUNTER (OUTPATIENT)
Dept: RADIOLOGY | Facility: HOSPITAL | Age: 68
Discharge: HOME | End: 2023-07-01
Attending: UROLOGY
Payer: MEDICARE

## 2023-07-01 DIAGNOSIS — R31.29 MICROSCOPIC HEMATURIA: ICD-10-CM

## 2023-07-01 LAB
CREAT SERPL-MCNC: 1.1 MG/DL (ref 0.5–1.4)
SAMPLE: NORMAL

## 2023-07-01 PROCEDURE — 25500020 PHARM REV CODE 255

## 2023-07-01 PROCEDURE — 74178 CT ABD&PLV WO CNTR FLWD CNTR: CPT | Mod: TC

## 2023-07-01 PROCEDURE — 74178 CT ABD&PLV WO CNTR FLWD CNTR: CPT | Mod: 26,,, | Performed by: RADIOLOGY

## 2023-07-01 PROCEDURE — 74178 CT UROGRAM ABD PELVIS W WO: ICD-10-PCS | Mod: 26,,, | Performed by: RADIOLOGY

## 2023-07-01 RX ADMIN — IOHEXOL 125 ML: 350 INJECTION, SOLUTION INTRAVENOUS at 11:07

## 2023-07-06 ENCOUNTER — TELEPHONE (OUTPATIENT)
Dept: UROLOGY | Facility: CLINIC | Age: 68
End: 2023-07-06
Payer: MEDICARE

## 2023-07-06 NOTE — TELEPHONE ENCOUNTER
----- Message from Tien Gonzalez sent at 7/6/2023  9:55 AM CDT -----  Type: Needs Medical Advice  Who Called:  pt's wife Ann-Marie Marino Call Back Number: [phone]  Additional Information: pt's wife stated the pt wanted to ensure clinic and provider were made aware of pt completing needed ct and is wanting to see if office has received results. Please call back to advise asap, thanks!

## 2023-07-06 NOTE — TELEPHONE ENCOUNTER
You can let him know the CTU showed the following-  Ask if him if he's ever had any type of sphincter (urinary) or prosthesis (penile). He has this round fluid collection in his pelvis that's been there since at least 2021 and looks like a reservoir of some sort   See if he can do his cysto Monday July 10th instead      Ctu 7/1/23  1. Cholelithiasis.- gallstones, see pcp or GI if having pain in right upper quadrant, nausea or vomiting  2. Splenomegaly.- large spleen, he can see pcp for this  3. Small nonobstructing right renal calculi.- tiny stones on right can cause blood in urine  4. Small subcentimeter renal cysts.- nothing to do for this  5. Diverticulosis.- common finding, he can talk to pcp about this   6. Mild circumferential bladder wall thickening.  This may be secondary to a lack of adequate distention.  Differential diagnosis includes cystitis.  Correlate clinically with UA.- this means he could have had a uti recently   7. 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  This may be related to prior surgery.  Correlate clinically for prior implant device. (Small chronic stable 3.2 cm cystic lesion of the left paramedian pelvis at the level of the pubic symphysis again slightly displacing the bladder)  8. Prior prostatectomy.  9. Chronic fat containing paraumbilical hernia.-nothing to do for this

## 2023-07-06 NOTE — TELEPHONE ENCOUNTER
Spoke with patient's wife informed her provider has not reviewed results. Will call back with results and recommendations once reviewed. Verbally voiced understanding.

## 2023-07-07 NOTE — TELEPHONE ENCOUNTER
Spoke with patient informed him of ct results and recommendations. Agreed to cystoscopy on 7/10. Calvin at [facility] informed.

## 2023-07-10 ENCOUNTER — HOSPITAL ENCOUNTER (OUTPATIENT)
Facility: HOSPITAL | Age: 68
Discharge: HOME | End: 2023-07-10
Attending: UROLOGY | Admitting: UROLOGY
Payer: MEDICARE

## 2023-07-10 DIAGNOSIS — R31.29 MICROHEMATURIA: ICD-10-CM

## 2023-07-10 DIAGNOSIS — R31.29 MICROSCOPIC HEMATURIA: ICD-10-CM

## 2023-07-10 LAB
BILIRUB SERPL-MCNC: NORMAL MG/DL
BLOOD URINE, POC: NORMAL
COLOR, POC UA: YELLOW
GLUCOSE UR QL STRIP: NORMAL
KETONES UR QL STRIP: NORMAL
LEUKOCYTE ESTERASE URINE, POC: NORMAL
NITRITE, POC UA: NORMAL
PH, POC UA: 6.5
PROTEIN, POC: NORMAL
SPECIFIC GRAVITY, POC UA: 1.02
UROBILINOGEN, POC UA: 0.2

## 2023-07-10 PROCEDURE — 25000003 PHARM REV CODE 250: Performed by: UROLOGY

## 2023-07-10 PROCEDURE — 52000 CYSTOURETHROSCOPY: CPT | Mod: ,,, | Performed by: UROLOGY

## 2023-07-10 PROCEDURE — 52000 PR CYSTOURETHROSCOPY: ICD-10-PCS | Mod: ,,, | Performed by: UROLOGY

## 2023-07-10 PROCEDURE — 52000 CYSTOURETHROSCOPY: CPT | Performed by: UROLOGY

## 2023-07-10 RX ORDER — TAMSULOSIN HYDROCHLORIDE 0.4 MG/1
0.4 CAPSULE ORAL NIGHTLY
Qty: 90 CAPSULE | Refills: 3 | Status: SHIPPED | OUTPATIENT
Start: 2023-07-10 | End: 2023-07-24

## 2023-07-10 RX ORDER — LIDOCAINE HYDROCHLORIDE 20 MG/ML
JELLY TOPICAL
Status: DISCONTINUED | OUTPATIENT
Start: 2023-07-10 | End: 2023-07-10 | Stop reason: HOSPADM

## 2023-07-10 NOTE — H&P
[facility] H&P Note - Agenda  Staff: MD Mary  PCP: Dr.Mcelveen [facility]     Chief Complaint: prostate cancer     Subjective:        HPI: Marcus Parisi is a 64 y.o. male presents with  Prostate cancer, T2c Gl3+3, T2c psa 2.9, NxMx prostate nodules, B apex and MH.  He was initially seen by me in 2018 for rising psa.   He was found to have Gl 3+3 in 4/14 cores Jan 2019 and was referred due to insurance issues to .    took him for a RALP on 7/1/20. Gl 4+3 with teriary 5 in <5%. Tumor involves 20 to 25% of prostate. Right apical and Left posterior margins involved by cancer.  Also had a h/o stones 10+ years ago. Had a stent but leaked continuously   9/11/21 psa at [facility] <0.1  10/27/21: Saw francoise on 10/27/21 and was c/o gross hematuria. CTU showed mid to distal L ureteral 3mm stone. Pt wanted it removed.     Interval history 11/3/21  He is here in preop and says he hasn't had any hematuria in 3 to 4 days. He's had no flank pain in 3 weeks. He never received flomax ([facility]). He also has not passed stone.  Prostate cancer- still has some incontinence  He was discharged on flomax with a plan for a repeat ctrss     Interval history 11/10/21:  ctrss 11/8/21 showed L ureteral 4mm stone in same position with moderate hydro, no left renal stones. 2, 2mm stones in right kidney. Long bladder . Therefore here today for cystoscopy and ureteroscopy to extract stone as long as no infection beyond stone   psa is 0.03 (no previous super sensitive psa's to go by)  Still with LUIS ENRIQUE        Interval history 2/8/22:  11/10/21 cysto L urs and stone extraction. Stone was not radioopaque. Inflammation around stone. Found to have BNC. Left stent on strings. Stones mostly calcium oxalate.   1/25/22 psa up to 0.04  rbus 2/3/22: 8mm stone on L wo hydro. No stone on right  kub 2/3/22: no stones seen     Hasn't passed any stones. Didn't notice much diffference after  procedure  (no increased flow) and denies any slow flow. Denies any frequency or ugency. Still has LUIS ENRIQUE.     Interval history since last visit with me:  Uroflow results (date: 02/16/2022): Voiding time: 21.1s, Flow time: 17.2s, TTP flow: 9.3s, Peak flowrate: 24.1 mL/s, Average flowrate: 13.5mL/s, Intervals: 2, Voided volume: 233 mL, Pvr by bladder scan: 0mL . Pattern of curve: see uploaded image, reviewed by     5/17/122 0.04    5/24/22: He apparently is using cialis 20mg daily. Should not be doing this daily. Not interested in LAURA. Not a good candidate for inection. On eliquis.     Interval history 11/29/22:  Had a psa done on 11/22/22 and was 0.05. still urinating well with no issues.   Using cialis as needed now, not daily. Doing well on this.   Ua today with tr bld but no uti sx. No flank pain.     Interval history 3/6/23:  He returns today with a repeat PSA done on 02/28/2023, up to 0.07 now.  He also had a kidney ultrasound on 02/28/2023 showing 1 6 mm stone in each kidney, KUB (Xray of the abdomen to look for stones) 02/28/2023 showing no stones  Voided urine today shows trace blood.  Denies any flank pain or gross hematuria.  No back pain.  Stays active.on cialis as needed.   History of bladder neck contracture but denies any difficulty urinating.  Good flow.  He says that he has been having stress incontinence since his prostatectomy however his nighttime incontinence has improved significantly.  Incontinence most noticeable with strenuous activity.  Wears 1 depends or liner during the day.  nighttime depends now dry. Only urinates 3x during the day. Does do kegels.         Interval history 6/20/23:  Here today to discuss his PSA.  We have been monitoring it since it is slowly rising.  PSA up to 0.09.  0.02 higher than 3 months ago.  Has not had any back pain.  Also of note his urine today shows large blood.  Last time it only showed trace blood.  He denies any gross hematuria.  He is on Eliquis.  Denies  any flank pain.  Plan was to get an x-ray in February.      Interval history 7/10/23:  6/20/23 ua showed large blood, 20 rbc- scheduled him for ctu and cysto.  6/27/23 called c/o uti sx. Urine sent for u/a, urine culture and urine cytology.   6/27/23 cytology neg  Ucx 6/27/23: e.coli. ua with 3+leuk/nit+/3+bld, ua neil - 20 rbc/>100 wbc/mod bact, tx with cipro bid x 10d. Finished a few days ago.   Had another psa on 7/1/23 and now up to 0.14  Ctu 7/1/23 for uti.   1. Cholelithiasis.- gallstones, see pcp or GI if having pain in right upper quadrant, nausea or vomiting  2. Splenomegaly.- large spleen, he can see pcp for this  3. Small nonobstructing right renal calculi.- tiny stones on right can cause blood in urine  4. Small subcentimeter renal cysts.- nothing to do for this  5. Diverticulosis.- common finding, he can talk to pcp about this   6. Mild circumferential bladder wall thickening.  This may be secondary to a lack of adequate distention.  Differential diagnosis includes cystitis.  Correlate clinically with UA.- this means he could have had a uti recently   7. 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  This may be related to prior surgery.  Correlate clinically for prior implant device. (Small chronic stable 3.2 cm cystic lesion of the left paramedian pelvis at the level of the pubic symphysis again slightly displacing the bladder)-seen on ctu from 2021 but not on prostate mri from 2019  8. Prior prostatectomy.  9. Chronic fat containing paraumbilical hernia.-nothing to do for this      Here today for cysto for uti. Denies any prosthesis to explain fluid collection on pelvis.   Says since starting flomax and antibiotics he is emptying better with a better flow.   Ua today: mod blood      psa history   7/1/23  0.14  6/6/23  0.09  2/28/23 0.07  11/22/22 0.05  5/24/22  pvr by scan: 11  5/17/22 0.04   2/8/22  pvr by scan: 0  1/25/22 0.04  11/8/21 0.03  9/11/21 <0.1    7/31/20 RALP Gl 4+3 with teriary 5 in <5%. Tumor involves 20 to 25% of prostate.   1/28/19  psa 2.9, trus vol: 40.5g, T2c (b palp nodules), Gl 3+3 in RBM, LML, LONDONO, LTZ (4/14).  HGPIN in  RBL, RBM (2/14). ASAP in SHELLY (1/14)  01/14/19          2.9, %free 15.5, JAHAIRA: 20g, definite nodule on left apex and one on right apex.   10/9/18            JAHAIRA: 20g gland without any discrete masses, left side however more firm, no tenderness.  9/18/18            2.1  9/2017             1.2      Urine history: eliquis for afib. No tobacco hx. + stones.   7/10/23 Mod blood  6/27/23 E.coli, 3+leuk/nit+/3+bld, >100 wbc/ 20rbc/mod bact  6/20/23 Large bld, 20 rbc/few solitario  3/6/23  Tr bld  5/24/22 Tr bld  02/8/22 neg  11/10/21 90%calcium oxalate/10% calcium phosphate  10/27/21 Ng, void: red, 3+bld, >100 rbc, cyt: neg.   6/19/21 coreen  1/28/19 Ng, void: tr wbc- no sx of uti  1/21/19 ng, void: neg  1/15/19 No cx, void: tr blood, cytology: negative  10/9/18            No cx, void: neg       REVIEW OF SYSTEMS:  General ROS: no fevers, no chills  Psychological ROS: no depression  Endocrine ROS: no heat or cold  Respiratory ROS: no SOB  Cardiovascular ROS: no CP  Gastrointestinal ROS: no abdominal pain, no constipation, non diarrhea, on BRBPR  Musculoskeletal ROS: no muscle pain  Neurological ROS: kapil headaches  Dermatological ROS: no rashes  HEENT: no glasses, no sinus   ROS: per HPI     Objective:      Vitals:    07/10/23 1422   BP: 120/76   Pulse: 95   Resp: 20   Temp: 98.8 °F (37.1 °C)          General:wdwn  in NAD  Neurologic: CN grossly normal. Normal sensation.   Psychiatric: awake, alert and oriented x 3. Mood and affect Normal. Cooperative.  Eyes: PERRLA, normal conjunctiva  Respiratory: no increased work on breathing. No wheezing.   Cardiovascular: No obvious extremity edema. Warm and well perfused.  GI: no obvious stomach distension  Musculoskeletal: normal  range of motion of bilateral upper extremities. Normal muscle strength and  tone.  Skin: no obvious rashes or lesions. No tightening of skin noted.    Pertinent  exam in HPI      Recent Labs   Lab 06/13/22  0000 12/09/22  0000 06/01/23  0714   WBC 5.1 4.2 9.4   Hemoglobin 14.8 15.1 15.1   Hematocrit 44.8 45.1 46.6   Platelets 154 161 186   ]  Recent Labs   Lab 01/12/21  1105 06/19/21  0957 11/10/21  0808 12/13/21  0941 06/13/22  0000 12/09/22  0000 06/01/23  0714   Sodium  --    < > 138   < > 140 138 138   Potassium 4.3   < > 3.8   < > 4.3 4.1 4.5   Chloride  --    < > 107   < > 105 105 105   CO2  --    < > 24   < > 28 26 25   BUN  --    < > 17   < > 18 13 17   Creatinine  --    < > 1.0   < > 0.97 0.91 0.82   Glucose  --    < > 142 H   < > 139 H 143 H 119 H   Calcium  --    < > 9.0   < > 9.1 9.3 9.3   Magnesium 2.0  --   --   --   --   --   --    Alkaline Phosphatase  --   --  66  --   --   --   --    Total Protein  --    < > 6.4   < > 6.2 6.4 6.2   Albumin  --    < > 3.8   < > 4.2 4.2 4.2   Total Bilirubin  --    < > 2.3 H   < > 1.3 H 1.6 H 1.7 H   AST  --    < > 13   < > 11 13 9 L   ALT  --    < > 14   < > 12 14 12    < > = values in this interval not displayed.   ]    Lab Results   Component Value Date    HGBA1C 5.7 (H) 06/01/2023            Assessment:         Marcus Parisi is a 68 y.o. male with     1. Microscopic hematuria    2. Microhematuria      Prostate cancer s/p ralp with rising psa       Plan:     Continue to monitor diagnostic psa for H/o Prostate cancer, Gl 4+3 with tertiary 5 in <5% s/p ralp in 7/2020 with + margins with now slowly rising psa.  . Very slowly rising. Now 0.09. diagnostic psa in 3months and f/u after.   Discussed checking psa every 3 vs 6 months but indicated with high risk prostate cancer. So we can continue every 3 for now.   Calculated PSADT but not really a good indicator with PSA<0.2  Could do a psma scan once psa >0.2  Could add ADT if psa continues to rise >0.5.   Urine with MH and recent uti- cysto today to complete w/u. Ctu showed no stones.    Continue to use Cialis as needed.   Continue kegels regularly, 2x a day, 10x in a row. Timed voiding.   Return sooner if having any difficulty urinating since he has h/o BNC. Last peak flow 24.     F/u in 3 months with diagnostic psa prior - will likely need psma      This patient has been cleared for surgery in ambulatory surgical facility.                 Generally speaking, PSMA PET Scans using PSMA (18F-DCFPyL) have been shown to be effective at PSA levels above 0.2ng/mL. Also generally speaking, the higher the PSA level (when restaging patients), the more likely a PSMA PET Scan is to detect and identify recurrent prostate cancer.  psa diagnostic in 6 months and f/u after (standing orders in).   But if psa rising, will order prostate mri to eval for any local recurrence at sites of positive margins.         PSA doubling time info:  https://www.Telerivetalc.com/calc/33469/psa-doubling-time-psadt-calculator#evidence  https://jamanetwork.com/journals/kristin/fullarticle/479459  The length of time after surgery prior to biochemical recurrence was important in determining the risk of eventual distant failure for men with lower (5, 6, and 7) and men with high (8, 9, and 10) [place] scores (Figure 4). A similar observation was made previously by Rahul et al22 in a report demonstrating that a high Maricarmen score and advanced pathologic stage were important in determining the likelihood of local or distant failure. Using a cutoff of 10 months, PSADT provided further substratification for men with a [place] score of less than 8. Men with rapid PSA level elevation (<2 years), a [place] score of 5 to 7, and a PSADT (>10 months) demonstrated a 76% probability of remaining free of metastatic disease for 5 years following initial PSA level elevation compared with men with a shorter PSADT (<10 months) who had only 35% chance of remaining free of metastatic disease for 5 years after biochemical recurrence. Although not as strong as  Maricarmen score, time of biochemical recurrence, and PSADT, the pathologic stage did contribute to the likelihood of distant metastasis (P=.01). The PSADT has been suggested by Kong et al13 as a useful predictor of the type of eventual recurrence after radical prostatectomy. They measured the PSADT for a group of 77 men with biochemical recurrence following radical retropubic prostatectomy and found that shorter PSADTs (<6 months) were more indicative of distant disease when compared with local recurrence.

## 2023-07-10 NOTE — PATIENT INSTRUCTIONS
Assesment: Marcus Parisi is a 68 y.o. male with   1. Microscopic hematuria    2. Microhematuria      Prostate cancer s/p ralp with rising psa. Recent unexplained uti with GH. No stone passage. Cysto today neg for tumors. Likely from recent uti + anticoag. Unclear why he had a uti. Empties, not diabetic, no strictue. Didn't pass any stones (has 2 small ones same size as 2021 in same location).     Plan:  Although he no longer has a prostate he says flomax helps him empty better with better flow and he wants to continue.   Rising psa - now 0.14. repeat in 3 months and if higher and >0.2 can order psma scan prior to f/u or order at f/u   Continue flomax nightly for now  F/u on 9/21 with psa prior for aua ssx and pvr

## 2023-07-10 NOTE — PLAN OF CARE
Stable, states ready to go home, ellen po fluids, denies pain, voided, ambulated to car with wife to self care

## 2023-07-10 NOTE — OP NOTE
[facility] Procedure Note- [facility]  Date: 07/10/2023    Procedures: Flexible cystourethroscopy \    Pre Procedure Diagnosis:  1. Microscopic hematuria    2. Microhematuria        Post Procedure Diagnosis: same, see below for findings    Surgeon: Delmy Hernandez MD    Indications: Marcus Parisi is a 68 y.o. male with above pre-procedure diagnosis. Urine from today reviewed. H&P reviewed.     Specimen: none    Anesthesia: 2% uro-jet lidocaine jelly for local analgesia    Procedure in detail:   Flexible cysto-urethroscopy was performed after consent was obtained.  The risks and benefits were explained.    2% lidocaine urojet was used for local analgesia.  The genitalia was prepped and draped in the sterile fashion with betadine.    The flexible scope was advanced into the urethra and into the bladder.  Bilateral ureteral orifice were evaluated and noted to be normal with clear efflux.  The bladder was completely surveyed in a systematic fashion and the cytoscope was retroflexed.  Cystoscopy findings as listed below.     The patient tolerated the procedure well without complication.    Findings: (pictures were uploaded into media)  No stricture. Empty prostatic fossa. No tumors in kidney. Could not see extrinsic compression from pelvic fluid collection posteiror to pubic symphsis.           Assesment: Marcus Parisi is a 68 y.o. male with   1. Microscopic hematuria    2. Microhematuria      Prostate cancer s/p ralp with rising psa. Recent unexplained uti with GH. No stone passage. Cysto today neg for tumors. Likely from recent uti + anticoag. Unclear why he had a uti. Empties, not diabetic, no strictue. Didn't pass any stones (has 2 small ones same size as 2021 in same location).     Plan:  Although he no longer has a prostate he says flomax helps him empty better with better flow and he wants to continue.   Rising psa - now 0.14. repeat in 3 months and if higher and >0.2 can order psma scan prior to f/u or  order at f/u   Continue flomax nightly for now  F/u on 9/21 with psa prior for aua ssx and pvr    Delmy Hernandez MD

## 2023-07-10 NOTE — DISCHARGE SUMMARY
[facility] - Periop Services  Urology  Discharge Note - Short Stay      Patient Name: Marcus Parisi  MRN: [id number]  Discharge Date and Time:  07/10/2023 4:47 PM  Attending Physician: Delmy Hernandez,*   Discharging Provider: Delmy Hernandez MD  Primary Care Physician: Lois Laureano, ADALBERTO-KIM    There are no hospital problems to display for this patient.      Final Diagnoses: Same as principal problem.    Hospital Course: Patient was admitted for an outpatient procedure and tolerated the procedure well with no complications.*    Procedure(s) (LRB):  CYSTOSCOPY (N/A)     Indwelling Lines/Drains at time of discharge:   Lines/Drains/Airways       None                   Discharged Condition: good    Disposition: home    Follow Up:      Patient Instructions:   No discharge procedures on file.    Medications:  Reconciled Home Medications:      Medication List        CONTINUE taking these medications      amlodipine-benazepril 5-20 mg 5-20 mg per capsule  Commonly known as: LOTREL  TAKE 1 CAPSULE EVERY DAY     atorvastatin 20 MG tablet  Commonly known as: LIPITOR  TAKE 1 TABLET EVERY EVENING     cyanocobalamin 1000 MCG tablet  Commonly known as: VITAMIN B-12  Take 1,000 mcg by mouth once daily.     ELIQUIS 5 mg Tab  Generic drug: apixaban  TAKE 1 TABLET TWICE DAILY     levothyroxine 50 MCG tablet  Commonly known as: SYNTHROID  TAKE 1 TABLET BEFORE BREAKFAST     meloxicam 15 MG tablet  Commonly known as: MOBIC  Take 1 tablet (15 mg total) by mouth daily as needed for Pain.     metoprolol succinate 50 MG 24 hr tablet  Commonly known as: TOPROL-XL  Take 1 tablet (50 mg total) by mouth once daily.     OMEGA-3 ACID ETHYL ESTERS ORAL  Take 1 capsule by mouth once daily.     tadalafiL 20 MG Tab  Commonly known as: CIALIS  Take 1 tablet (20 mg total) by mouth as needed (as needed prior to intercourse).     tamsulosin 0.4 mg Cap  Commonly known as: FLOMAX  Take 1 capsule (0.4 mg total) by mouth every  evening. Take nightly for stent/stone              Discharge Procedure Orders (must include Diet, Follow-up, Activity):  No discharge procedures on file.     Assesment: Marcus Parisi is a 68 y.o. male with   1. Microscopic hematuria    2. Microhematuria      Prostate cancer s/p ralp with rising psa. Recent unexplained uti with GH. No stone passage. Cysto today neg for tumors. Likely from recent uti + anticoag. Unclear why he had a uti. Empties, not diabetic, no strictue. Didn't pass any stones (has 2 small ones same size as 2021 in same location).     Plan:  Although he no longer has a prostate he says flomax helps him empty better with better flow and he wants to continue.   Rising psa - now 0.14. repeat in 3 months and if higher and >0.2 can order psma scan prior to f/u or order at f/u   Continue flomax nightly for now  F/u on 9/21 with psa prior for aua ssx and pvr    Delmy Hernandez MD  Urology  [facility]ly [facility] - Periop Services

## 2023-07-12 ENCOUNTER — PATIENT MESSAGE (OUTPATIENT)
Dept: FAMILY MEDICINE | Facility: CLINIC | Age: 68
End: 2023-07-12

## 2023-07-13 VITALS
OXYGEN SATURATION: 98 % | WEIGHT: 294 LBS | BODY MASS INDEX: 43.55 KG/M2 | TEMPERATURE: 99 F | DIASTOLIC BLOOD PRESSURE: 75 MMHG | HEIGHT: 69 IN | HEART RATE: 68 BPM | RESPIRATION RATE: 20 BRPM | SYSTOLIC BLOOD PRESSURE: 125 MMHG

## 2023-07-24 RX ORDER — TAMSULOSIN HYDROCHLORIDE 0.4 MG/1
0.4 CAPSULE ORAL NIGHTLY
Qty: 90 CAPSULE | Refills: 0 | Status: SHIPPED | OUTPATIENT
Start: 2023-07-24 | End: 2023-12-05

## 2023-09-14 ENCOUNTER — LAB VISIT (OUTPATIENT)
Dept: LAB | Facility: HOSPITAL | Age: 68
End: 2023-09-14
Attending: UROLOGY
Payer: MEDICARE

## 2023-09-14 DIAGNOSIS — C61 MALIGNANT NEOPLASM OF PROSTATE: ICD-10-CM

## 2023-09-14 DIAGNOSIS — N20.0 NEPHROLITHIASIS: ICD-10-CM

## 2023-09-14 LAB — COMPLEXED PSA SERPL-MCNC: 0.1 NG/ML (ref 0–4)

## 2023-09-14 PROCEDURE — 36415 COLL VENOUS BLD VENIPUNCTURE: CPT | Performed by: UROLOGY

## 2023-09-14 PROCEDURE — 84153 ASSAY OF PSA TOTAL: CPT | Performed by: UROLOGY

## 2023-09-21 ENCOUNTER — OFFICE VISIT (OUTPATIENT)
Dept: UROLOGY | Facility: CLINIC | Age: 68
End: 2023-09-21
Payer: MEDICARE

## 2023-09-21 VITALS
WEIGHT: 294 LBS | BODY MASS INDEX: 43.55 KG/M2 | SYSTOLIC BLOOD PRESSURE: 108 MMHG | HEIGHT: 69 IN | HEART RATE: 105 BPM | DIASTOLIC BLOOD PRESSURE: 76 MMHG

## 2023-09-21 DIAGNOSIS — N20.0 NEPHROLITHIASIS: ICD-10-CM

## 2023-09-21 DIAGNOSIS — C61 PROSTATE CANCER: Primary | ICD-10-CM

## 2023-09-21 DIAGNOSIS — N39.3 SUI (STRESS URINARY INCONTINENCE), MALE: ICD-10-CM

## 2023-09-21 DIAGNOSIS — N52.1 ERECTILE DYSFUNCTION DUE TO DISEASES CLASSIFIED ELSEWHERE: ICD-10-CM

## 2023-09-21 LAB
BILIRUBIN, UA POC OHS: NEGATIVE
BLOOD, UA POC OHS: ABNORMAL
CLARITY, UA POC OHS: CLEAR
COLOR, UA POC OHS: YELLOW
GLUCOSE, UA POC OHS: NEGATIVE
KETONES, UA POC OHS: NEGATIVE
LEUKOCYTES, UA POC OHS: NEGATIVE
NITRITE, UA POC OHS: NEGATIVE
PH, UA POC OHS: 6
PROTEIN, UA POC OHS: NEGATIVE
SPECIFIC GRAVITY, UA POC OHS: 1.02
UROBILINOGEN, UA POC OHS: 0.2

## 2023-09-21 PROCEDURE — 99999 PR PBB SHADOW E&M-EST. PATIENT-LVL III: CPT | Mod: PBBFAC,,, | Performed by: UROLOGY

## 2023-09-21 PROCEDURE — 3288F FALL RISK ASSESSMENT DOCD: CPT | Mod: CPTII,S$GLB,, | Performed by: UROLOGY

## 2023-09-21 PROCEDURE — 3074F PR MOST RECENT SYSTOLIC BLOOD PRESSURE < 130 MM HG: ICD-10-PCS | Mod: CPTII,S$GLB,, | Performed by: UROLOGY

## 2023-09-21 PROCEDURE — 3044F PR MOST RECENT HEMOGLOBIN A1C LEVEL <7.0%: ICD-10-PCS | Mod: CPTII,S$GLB,, | Performed by: UROLOGY

## 2023-09-21 PROCEDURE — 3066F PR DOCUMENTATION OF TREATMENT FOR NEPHROPATHY: ICD-10-PCS | Mod: CPTII,S$GLB,, | Performed by: UROLOGY

## 2023-09-21 PROCEDURE — 3074F SYST BP LT 130 MM HG: CPT | Mod: CPTII,S$GLB,, | Performed by: UROLOGY

## 2023-09-21 PROCEDURE — 1101F PR PT FALLS ASSESS DOC 0-1 FALLS W/OUT INJ PAST YR: ICD-10-PCS | Mod: CPTII,S$GLB,, | Performed by: UROLOGY

## 2023-09-21 PROCEDURE — 3061F PR NEG MICROALBUMINURIA RESULT DOCUMENTED/REVIEW: ICD-10-PCS | Mod: CPTII,S$GLB,, | Performed by: UROLOGY

## 2023-09-21 PROCEDURE — 1159F MED LIST DOCD IN RCRD: CPT | Mod: CPTII,S$GLB,, | Performed by: UROLOGY

## 2023-09-21 PROCEDURE — 99214 PR OFFICE/OUTPT VISIT, EST, LEVL IV, 30-39 MIN: ICD-10-PCS | Mod: S$GLB,,, | Performed by: UROLOGY

## 2023-09-21 PROCEDURE — 1160F PR REVIEW ALL MEDS BY PRESCRIBER/CLIN PHARMACIST DOCUMENTED: ICD-10-PCS | Mod: CPTII,S$GLB,, | Performed by: UROLOGY

## 2023-09-21 PROCEDURE — 3061F NEG MICROALBUMINURIA REV: CPT | Mod: CPTII,S$GLB,, | Performed by: UROLOGY

## 2023-09-21 PROCEDURE — 99999 PR PBB SHADOW E&M-EST. PATIENT-LVL III: ICD-10-PCS | Mod: PBBFAC,,, | Performed by: UROLOGY

## 2023-09-21 PROCEDURE — 81003 POCT URINALYSIS(INSTRUMENT): ICD-10-PCS | Mod: QW,S$GLB,, | Performed by: UROLOGY

## 2023-09-21 PROCEDURE — 3078F PR MOST RECENT DIASTOLIC BLOOD PRESSURE < 80 MM HG: ICD-10-PCS | Mod: CPTII,S$GLB,, | Performed by: UROLOGY

## 2023-09-21 PROCEDURE — 4010F ACE/ARB THERAPY RXD/TAKEN: CPT | Mod: CPTII,S$GLB,, | Performed by: UROLOGY

## 2023-09-21 PROCEDURE — 1160F RVW MEDS BY RX/DR IN RCRD: CPT | Mod: CPTII,S$GLB,, | Performed by: UROLOGY

## 2023-09-21 PROCEDURE — 4010F PR ACE/ARB THEARPY RXD/TAKEN: ICD-10-PCS | Mod: CPTII,S$GLB,, | Performed by: UROLOGY

## 2023-09-21 PROCEDURE — 1126F AMNT PAIN NOTED NONE PRSNT: CPT | Mod: CPTII,S$GLB,, | Performed by: UROLOGY

## 2023-09-21 PROCEDURE — 1159F PR MEDICATION LIST DOCUMENTED IN MEDICAL RECORD: ICD-10-PCS | Mod: CPTII,S$GLB,, | Performed by: UROLOGY

## 2023-09-21 PROCEDURE — 99214 OFFICE O/P EST MOD 30 MIN: CPT | Mod: S$GLB,,, | Performed by: UROLOGY

## 2023-09-21 PROCEDURE — 3078F DIAST BP <80 MM HG: CPT | Mod: CPTII,S$GLB,, | Performed by: UROLOGY

## 2023-09-21 PROCEDURE — 1101F PT FALLS ASSESS-DOCD LE1/YR: CPT | Mod: CPTII,S$GLB,, | Performed by: UROLOGY

## 2023-09-21 PROCEDURE — 3044F HG A1C LEVEL LT 7.0%: CPT | Mod: CPTII,S$GLB,, | Performed by: UROLOGY

## 2023-09-21 PROCEDURE — 3008F BODY MASS INDEX DOCD: CPT | Mod: CPTII,S$GLB,, | Performed by: UROLOGY

## 2023-09-21 PROCEDURE — 3008F PR BODY MASS INDEX (BMI) DOCUMENTED: ICD-10-PCS | Mod: CPTII,S$GLB,, | Performed by: UROLOGY

## 2023-09-21 PROCEDURE — 3066F NEPHROPATHY DOC TX: CPT | Mod: CPTII,S$GLB,, | Performed by: UROLOGY

## 2023-09-21 PROCEDURE — 1126F PR PAIN SEVERITY QUANTIFIED, NO PAIN PRESENT: ICD-10-PCS | Mod: CPTII,S$GLB,, | Performed by: UROLOGY

## 2023-09-21 PROCEDURE — 3288F PR FALLS RISK ASSESSMENT DOCUMENTED: ICD-10-PCS | Mod: CPTII,S$GLB,, | Performed by: UROLOGY

## 2023-09-21 PROCEDURE — 81003 URINALYSIS AUTO W/O SCOPE: CPT | Mod: QW,S$GLB,, | Performed by: UROLOGY

## 2023-09-21 NOTE — PATIENT INSTRUCTIONS
Continue to monitor diagnostic psa for H/o Prostate cancer, Gl 4+3 with tertiary 5 in <5% s/p ralp in 7/2020 with + margins.  Recent psa rise likely due to prostatitis.  . diagnostic psa in 3months and f/u after.   Change psa to 6months (diagnostic) and f/u afte.r (march 2024)    Kidney stones- 4 tiny right stones  Xray and rbus in a year (July 2024)- change from feb     Ed- continue cialis 5mg daily    Slow flow- continue flomax or dc and if flow restart. With no prostate doesn't really need but good to have for stones.       F/u in 6 months with diagnostic psa prior

## 2023-11-18 LAB
25(OH)D3 SERPL-MCNC: 37 NG/ML (ref 30–100)
ALBUMIN SERPL-MCNC: 4.1 G/DL (ref 3.6–5.1)
ALBUMIN/CREAT UR: 2 MCG/MG CREAT
ALBUMIN/GLOB SERPL: 2.1 (CALC) (ref 1–2.5)
ALP SERPL-CCNC: 61 U/L (ref 35–144)
ALT SERPL-CCNC: 14 U/L (ref 9–46)
APPEARANCE UR: CLEAR
AST SERPL-CCNC: 12 U/L (ref 10–35)
BASOPHILS # BLD AUTO: 31 CELLS/UL (ref 0–200)
BASOPHILS NFR BLD AUTO: 0.7 %
BILIRUB SERPL-MCNC: 1.8 MG/DL (ref 0.2–1.2)
BILIRUB UR QL STRIP: NEGATIVE
BUN SERPL-MCNC: 13 MG/DL (ref 7–25)
BUN/CREAT SERPL: ABNORMAL (CALC) (ref 6–22)
CALCIUM SERPL-MCNC: 8.9 MG/DL (ref 8.6–10.3)
CHLORIDE SERPL-SCNC: 105 MMOL/L (ref 98–110)
CHOLEST SERPL-MCNC: 112 MG/DL
CHOLEST/HDLC SERPL: 3.6 (CALC)
CO2 SERPL-SCNC: 28 MMOL/L (ref 20–32)
COLOR UR: YELLOW
CREAT SERPL-MCNC: 1.01 MG/DL (ref 0.7–1.35)
CREAT UR-MCNC: 134 MG/DL (ref 20–320)
EGFR: 81 ML/MIN/1.73M2
EOSINOPHIL # BLD AUTO: 141 CELLS/UL (ref 15–500)
EOSINOPHIL NFR BLD AUTO: 3.2 %
ERYTHROCYTE [DISTWIDTH] IN BLOOD BY AUTOMATED COUNT: 13.9 % (ref 11–15)
GLOBULIN SER CALC-MCNC: 2 G/DL (CALC) (ref 1.9–3.7)
GLUCOSE SERPL-MCNC: 141 MG/DL (ref 65–99)
GLUCOSE UR QL STRIP: NEGATIVE
HBA1C MFR BLD: 6 % OF TOTAL HGB
HCT VFR BLD AUTO: 43.9 % (ref 38.5–50)
HDLC SERPL-MCNC: 31 MG/DL
HGB BLD-MCNC: 14.6 G/DL (ref 13.2–17.1)
HGB UR QL STRIP: NEGATIVE
KETONES UR QL STRIP: NEGATIVE
LDLC SERPL CALC-MCNC: 65 MG/DL (CALC)
LEUKOCYTE ESTERASE UR QL STRIP: NEGATIVE
LYMPHOCYTES # BLD AUTO: 656 CELLS/UL (ref 850–3900)
LYMPHOCYTES NFR BLD AUTO: 14.9 %
MCH RBC QN AUTO: 29.7 PG (ref 27–33)
MCHC RBC AUTO-ENTMCNC: 33.3 G/DL (ref 32–36)
MCV RBC AUTO: 89.4 FL (ref 80–100)
MICROALBUMIN UR-MCNC: 0.3 MG/DL
MONOCYTES # BLD AUTO: 458 CELLS/UL (ref 200–950)
MONOCYTES NFR BLD AUTO: 10.4 %
NEUTROPHILS # BLD AUTO: 3115 CELLS/UL (ref 1500–7800)
NEUTROPHILS NFR BLD AUTO: 70.8 %
NITRITE UR QL STRIP: NEGATIVE
NONHDLC SERPL-MCNC: 81 MG/DL (CALC)
PH UR STRIP: 5.5 [PH] (ref 5–8)
PLATELET # BLD AUTO: 145 THOUSAND/UL (ref 140–400)
PMV BLD REES-ECKER: 10.8 FL (ref 7.5–12.5)
POTASSIUM SERPL-SCNC: 4.1 MMOL/L (ref 3.5–5.3)
PROT SERPL-MCNC: 6.1 G/DL (ref 6.1–8.1)
PROT UR QL STRIP: NEGATIVE
RBC # BLD AUTO: 4.91 MILLION/UL (ref 4.2–5.8)
SODIUM SERPL-SCNC: 137 MMOL/L (ref 135–146)
SP GR UR STRIP: 1.02 (ref 1–1.03)
T4 FREE SERPL-MCNC: 1 NG/DL (ref 0.8–1.8)
TRIGL SERPL-MCNC: 82 MG/DL
TSH SERPL-ACNC: 2.67 MIU/L (ref 0.4–4.5)
WBC # BLD AUTO: 4.4 THOUSAND/UL (ref 3.8–10.8)

## 2023-11-30 ENCOUNTER — OFFICE VISIT (OUTPATIENT)
Dept: FAMILY MEDICINE | Facility: CLINIC | Age: 68
End: 2023-11-30
Payer: MEDICARE

## 2023-11-30 VITALS
WEIGHT: 310 LBS | DIASTOLIC BLOOD PRESSURE: 70 MMHG | SYSTOLIC BLOOD PRESSURE: 110 MMHG | BODY MASS INDEX: 45.91 KG/M2 | OXYGEN SATURATION: 97 % | HEART RATE: 84 BPM | TEMPERATURE: 99 F | HEIGHT: 69 IN

## 2023-11-30 DIAGNOSIS — I48.0 PAROXYSMAL ATRIAL FIBRILLATION: ICD-10-CM

## 2023-11-30 DIAGNOSIS — I10 ESSENTIAL (PRIMARY) HYPERTENSION: Primary | ICD-10-CM

## 2023-11-30 DIAGNOSIS — R73.01 IMPAIRED FASTING GLUCOSE: ICD-10-CM

## 2023-11-30 DIAGNOSIS — E66.01 CLASS 3 SEVERE OBESITY DUE TO EXCESS CALORIES WITH SERIOUS COMORBIDITY AND BODY MASS INDEX (BMI) OF 45.0 TO 49.9 IN ADULT: ICD-10-CM

## 2023-11-30 DIAGNOSIS — E55.9 VITAMIN D INSUFFICIENCY: ICD-10-CM

## 2023-11-30 DIAGNOSIS — E03.9 ACQUIRED HYPOTHYROIDISM: ICD-10-CM

## 2023-11-30 DIAGNOSIS — Z23 NEED FOR VACCINATION: ICD-10-CM

## 2023-11-30 DIAGNOSIS — E78.5 DYSLIPIDEMIA: ICD-10-CM

## 2023-11-30 PROCEDURE — 3061F PR NEG MICROALBUMINURIA RESULT DOCUMENTED/REVIEW: ICD-10-PCS | Mod: CPTII,S$GLB,, | Performed by: NURSE PRACTITIONER

## 2023-11-30 PROCEDURE — 3074F SYST BP LT 130 MM HG: CPT | Mod: CPTII,S$GLB,, | Performed by: NURSE PRACTITIONER

## 2023-11-30 PROCEDURE — 3078F PR MOST RECENT DIASTOLIC BLOOD PRESSURE < 80 MM HG: ICD-10-PCS | Mod: CPTII,S$GLB,, | Performed by: NURSE PRACTITIONER

## 2023-11-30 PROCEDURE — 90662 IIV NO PRSV INCREASED AG IM: CPT | Mod: S$GLB,,, | Performed by: NURSE PRACTITIONER

## 2023-11-30 PROCEDURE — 3078F DIAST BP <80 MM HG: CPT | Mod: CPTII,S$GLB,, | Performed by: NURSE PRACTITIONER

## 2023-11-30 PROCEDURE — 4010F PR ACE/ARB THEARPY RXD/TAKEN: ICD-10-PCS | Mod: CPTII,S$GLB,, | Performed by: NURSE PRACTITIONER

## 2023-11-30 PROCEDURE — 3066F NEPHROPATHY DOC TX: CPT | Mod: CPTII,S$GLB,, | Performed by: NURSE PRACTITIONER

## 2023-11-30 PROCEDURE — 3288F PR FALLS RISK ASSESSMENT DOCUMENTED: ICD-10-PCS | Mod: CPTII,S$GLB,, | Performed by: NURSE PRACTITIONER

## 2023-11-30 PROCEDURE — 3288F FALL RISK ASSESSMENT DOCD: CPT | Mod: CPTII,S$GLB,, | Performed by: NURSE PRACTITIONER

## 2023-11-30 PROCEDURE — G0008 FLU VACCINE - QUADRIVALENT - HIGH DOSE (65+) PRESERVATIVE FREE IM: ICD-10-PCS | Mod: S$GLB,,, | Performed by: NURSE PRACTITIONER

## 2023-11-30 PROCEDURE — 1101F PR PT FALLS ASSESS DOC 0-1 FALLS W/OUT INJ PAST YR: ICD-10-PCS | Mod: CPTII,S$GLB,, | Performed by: NURSE PRACTITIONER

## 2023-11-30 PROCEDURE — 99214 OFFICE O/P EST MOD 30 MIN: CPT | Mod: S$GLB,,, | Performed by: NURSE PRACTITIONER

## 2023-11-30 PROCEDURE — 3044F PR MOST RECENT HEMOGLOBIN A1C LEVEL <7.0%: ICD-10-PCS | Mod: CPTII,S$GLB,, | Performed by: NURSE PRACTITIONER

## 2023-11-30 PROCEDURE — 1159F PR MEDICATION LIST DOCUMENTED IN MEDICAL RECORD: ICD-10-PCS | Mod: CPTII,S$GLB,, | Performed by: NURSE PRACTITIONER

## 2023-11-30 PROCEDURE — 1159F MED LIST DOCD IN RCRD: CPT | Mod: CPTII,S$GLB,, | Performed by: NURSE PRACTITIONER

## 2023-11-30 PROCEDURE — 3066F PR DOCUMENTATION OF TREATMENT FOR NEPHROPATHY: ICD-10-PCS | Mod: CPTII,S$GLB,, | Performed by: NURSE PRACTITIONER

## 2023-11-30 PROCEDURE — 1160F RVW MEDS BY RX/DR IN RCRD: CPT | Mod: CPTII,S$GLB,, | Performed by: NURSE PRACTITIONER

## 2023-11-30 PROCEDURE — 3074F PR MOST RECENT SYSTOLIC BLOOD PRESSURE < 130 MM HG: ICD-10-PCS | Mod: CPTII,S$GLB,, | Performed by: NURSE PRACTITIONER

## 2023-11-30 PROCEDURE — 3008F BODY MASS INDEX DOCD: CPT | Mod: CPTII,S$GLB,, | Performed by: NURSE PRACTITIONER

## 2023-11-30 PROCEDURE — 1101F PT FALLS ASSESS-DOCD LE1/YR: CPT | Mod: CPTII,S$GLB,, | Performed by: NURSE PRACTITIONER

## 2023-11-30 PROCEDURE — 4010F ACE/ARB THERAPY RXD/TAKEN: CPT | Mod: CPTII,S$GLB,, | Performed by: NURSE PRACTITIONER

## 2023-11-30 PROCEDURE — 99214 PR OFFICE/OUTPT VISIT, EST, LEVL IV, 30-39 MIN: ICD-10-PCS | Mod: S$GLB,,, | Performed by: NURSE PRACTITIONER

## 2023-11-30 PROCEDURE — 90662 FLU VACCINE - QUADRIVALENT - HIGH DOSE (65+) PRESERVATIVE FREE IM: ICD-10-PCS | Mod: S$GLB,,, | Performed by: NURSE PRACTITIONER

## 2023-11-30 PROCEDURE — 3008F PR BODY MASS INDEX (BMI) DOCUMENTED: ICD-10-PCS | Mod: CPTII,S$GLB,, | Performed by: NURSE PRACTITIONER

## 2023-11-30 PROCEDURE — 3061F NEG MICROALBUMINURIA REV: CPT | Mod: CPTII,S$GLB,, | Performed by: NURSE PRACTITIONER

## 2023-11-30 PROCEDURE — 3044F HG A1C LEVEL LT 7.0%: CPT | Mod: CPTII,S$GLB,, | Performed by: NURSE PRACTITIONER

## 2023-11-30 PROCEDURE — G0008 ADMIN INFLUENZA VIRUS VAC: HCPCS | Mod: S$GLB,,, | Performed by: NURSE PRACTITIONER

## 2023-11-30 PROCEDURE — 1160F PR REVIEW ALL MEDS BY PRESCRIBER/CLIN PHARMACIST DOCUMENTED: ICD-10-PCS | Mod: CPTII,S$GLB,, | Performed by: NURSE PRACTITIONER

## 2023-11-30 RX ORDER — AMLODIPINE AND BENAZEPRIL HYDROCHLORIDE 5; 20 MG/1; MG/1
1 CAPSULE ORAL DAILY
Qty: 90 CAPSULE | Refills: 1 | Status: SHIPPED | OUTPATIENT
Start: 2023-11-30

## 2023-11-30 RX ORDER — ATORVASTATIN CALCIUM 20 MG/1
20 TABLET, FILM COATED ORAL NIGHTLY
Qty: 90 TABLET | Refills: 1 | Status: SHIPPED | OUTPATIENT
Start: 2023-11-30

## 2023-11-30 RX ORDER — MUPIROCIN 20 MG/G
OINTMENT TOPICAL 3 TIMES DAILY
COMMUNITY
Start: 2023-10-12

## 2023-11-30 NOTE — PROGRESS NOTES
Subjective:       Patient ID: Marcus Parisi is a 68 y.o. male.    Chief Complaint: Diabetes, Hypertension, Hyperlipidemia, and Follow-up    Patient presents today for six-month follow-up for chronic conditions including hypertension, hyperlipidemia, thyroid problem, and obesity.    Have been received and reviewed.  Patient's medications are being well tolerated.  He has no complaints today.  HBA1C and blood sugar have risen and patient will be advised on this today. He states he has been eating more liberally.  He is obese with a BMI of 45.78      Hypertension  This is a chronic problem. The current episode started more than 1 month ago. The problem has been waxing and waning since onset. The problem is controlled. Pertinent negatives include no anxiety, blurred vision, orthopnea, palpitations, peripheral edema, PND, shortness of breath or sweats. There are no associated agents to hypertension. Risk factors for coronary artery disease include stress, obesity, dyslipidemia and male gender. Past treatments include calcium channel blockers and ACE inhibitors. The current treatment provides significant improvement. Compliance problems include exercise and diet.  There is no history of angina or kidney disease. Identifiable causes of hypertension include a thyroid problem. There is no history of chronic renal disease.   Thyroid Problem  Presents for follow-up visit. Patient reports no anxiety, cold intolerance, constipation, depressed mood, diarrhea, heat intolerance, palpitations, weight gain or weight loss. The symptoms have been worsening. His past medical history is significant for hyperlipidemia.   Hyperlipidemia  This is a recurrent problem. The current episode started more than 1 month ago. The problem is controlled. Recent lipid tests were reviewed and are variable. Exacerbating diseases include hypothyroidism and obesity. He has no history of chronic renal disease. Factors aggravating his hyperlipidemia include  fatty foods. Pertinent negatives include no focal weakness, leg pain or shortness of breath. Current antihyperlipidemic treatment includes statins. The current treatment provides moderate improvement of lipids. Compliance problems include adherence to exercise and adherence to diet.  Risk factors for coronary artery disease include stress, dyslipidemia, male sex, hypertension and obesity.   Erectile Dysfunction  This is a chronic problem. The current episode started more than 1 year ago. The problem has been waxing and waning since onset. The nature of his difficulty is achieving erection and maintaining erection. He reports no anxiety or decreased libido. He reports his erection duration to be 1 to 5 minutes. Irritative symptoms do not include urgency. Pertinent negatives include no dysuria or hematuria. The symptoms are aggravated by stress. Past treatments include tadalafil. The treatment provided moderate relief. He has been using treatment for 1 to 2 years. He has had no adverse reactions caused by medications. Risk factors include hypertension.   Diabetes  He presents for his follow-up (prediabetes) diabetic visit. He has type 2 diabetes mellitus. His disease course has been worsening. There are no hypoglycemic associated symptoms. Pertinent negatives for hypoglycemia include no confusion, dizziness, nervousness/anxiousness or sweats. There are no diabetic associated symptoms. Pertinent negatives for diabetes include no blurred vision, no polydipsia, no polyuria and no weight loss. There are no hypoglycemic complications. There are no diabetic complications. Risk factors for coronary artery disease include dyslipidemia, diabetes mellitus, hypertension, obesity, stress and sedentary lifestyle. Current diabetic treatment includes diet. He is compliant with treatment most of the time. His weight is fluctuating minimally. He is following a generally healthy diet. Meal planning includes avoidance of concentrated  sweets. He participates in exercise intermittently. His home blood glucose trend is fluctuating minimally.     Review of Systems   Constitutional:  Negative for activity change, appetite change, unexpected weight change, weight gain and weight loss.        Obesity   HENT:  Negative for ear discharge, ear pain, hearing loss, postnasal drip, rhinorrhea, sinus pain, trouble swallowing and voice change.         Ear fullness, decreased hearing   Eyes:  Negative for blurred vision, photophobia, pain, discharge and visual disturbance.   Respiratory:  Negative for chest tightness, shortness of breath and wheezing.    Cardiovascular:  Negative for palpitations, orthopnea, leg swelling and PND.        Hypertension, hyperlipidemia, afib   Gastrointestinal:  Negative for blood in stool, constipation and diarrhea.   Endocrine: Negative for cold intolerance, heat intolerance, polydipsia and polyuria.        Hypothyroid   Genitourinary:  Negative for decreased libido, difficulty urinating, dysuria, flank pain, hematuria and urgency.        Prostate cancer history, ED   Musculoskeletal:  Negative for gait problem.   Allergic/Immunologic: Negative for immunocompromised state.   Neurological:  Negative for dizziness and focal weakness.   Hematological:  Negative for adenopathy.   Psychiatric/Behavioral:  Negative for agitation, confusion, dysphoric mood, self-injury and suicidal ideas. The patient is not nervous/anxious.        Past Medical History:   Diagnosis Date    A-fib     Allergy     Anticoagulant long-term use     Arthritis     Asthma     as a child    Hyperlipidemia     Hypertension     Prostate cancer 2019    Sleep apnea     Use CPAP    Sleep apnea     uses C-PAP      Past Surgical History:   Procedure Laterality Date    CARPAL TUNNEL RELEASE Bilateral     CYSTOSCOPY N/A 1/28/2019    Procedure: CYSTOSCOPY;  Surgeon: Delmy Hernandez MD;  Location: [facility] OR;  Service: Urology;  Laterality: N/A;    CYSTOSCOPY N/A  7/10/2023    Procedure: CYSTOSCOPY;  Surgeon: Delmy Hernandez MD;  Location: [facility] OR;  Service: Urology;  Laterality: N/A;    CYSTOURETEROSCOPY WITH RETROGRADE PYELOGRAPHY AND INSERTION OF STENT INTO URETER Left 11/10/2021    Procedure: CYSTOURETEROSCOPY, WITH RETROGRADE PYELOGRAM AND URETERAL STENT INSERTION;  Surgeon: Delmy Hernandez MD;  Location: [facility] OR;  Service: Urology;  Laterality: Left;  please make 1st patient    INSERTION OF IMPLANTABLE LOOP RECORDER N/A 2021    Procedure: Insertion, Implantable Loop Recorder;  Surgeon: Almaz Rivas MD;  Location: [facility] CATH;  Service: Cardiology;  Laterality: N/A;    ROBOT-ASSISTED LAPAROSCOPIC PROSTATECTOMY USING DA LAVERN XI N/A 2020    Procedure: XI ROBOTIC PROSTATECTOMY;  Surgeon: Mejia Gold MD;  Location: [facility] OR;  Service: Urology;  Laterality: N/A;    TONSILLECTOMY      TRANSRECTAL BIOPSY OF PROSTATE WITH ULTRASOUND GUIDANCE N/A 2019    Procedure: BIOPSY, PROSTATE, RECTAL APPROACH, WITH US GUIDANCE;  Surgeon: Delmy Hernandez MD;  Location: [facility] OR;  Service: Urology;  Laterality: N/A;       Family History   Problem Relation Age of Onset    Cancer Mother     Asthma Mother     Diabetes Mother     Heart disease Mother     Hypertension Mother     Hypertension Father     Crohn's disease Brother     Cancer Maternal Grandmother        Social History     Socioeconomic History    Marital status:    Tobacco Use    Smoking status: Former     Current packs/day: 0.00     Average packs/day: 3.0 packs/day for 5.0 years (15.0 ttl pk-yrs)     Types: Cigarettes     Start date:      Quit date: 1975     Years since quittin.9    Smokeless tobacco: Former     Types: Chew    Tobacco comments:     quit 40 yrs ago   Substance and Sexual Activity    Alcohol use: Not Currently     Comment: occasional    Drug use: No    Sexual activity: Yes     Partners: Female     Social Determinants of Health     Financial Resource  Strain: Low Risk  (7/27/2020)    Overall Financial Resource Strain (CARDIA)     Difficulty of Paying Living Expenses: Not hard at all   Food Insecurity: No Food Insecurity (7/27/2020)    Hunger Vital Sign     Worried About Running Out of Food in the Last Year: Never true     Ran Out of Food in the Last Year: Never true   Transportation Needs: No Transportation Needs (7/27/2020)    PRAPARE - Transportation     Lack of Transportation (Medical): No     Lack of Transportation (Non-Medical): No   Physical Activity: Inactive (7/27/2020)    Exercise Vital Sign     Days of Exercise per Week: 0 days     Minutes of Exercise per Session: 0 min   Stress: No Stress Concern Present (4/25/2019)    [employer] [place] [employer] - Occupational Stress Questionnaire     Feeling of Stress : Not at all   Social Connections: Unknown (7/27/2020)    Social Connection and Isolation Panel [NHANES]     Frequency of Communication with Friends and Family: Twice a week     Frequency of Social Gatherings with Friends and Family: Never     Active Member of Clubs or Organizations: Yes     Attends Club or Organization Meetings: Never     Marital Status:        Current Outpatient Medications   Medication Sig Dispense Refill    apixaban (ELIQUIS) 5 mg Tab Take 1 tablet (5 mg total) by mouth 2 (two) times daily. 60 tablet 0    cyanocobalamin (VITAMIN B-12) 1000 MCG tablet Take 1,000 mcg by mouth once daily.       levothyroxine (SYNTHROID) 50 MCG tablet TAKE 1 TABLET BEFORE BREAKFAST 90 tablet 3    meloxicam (MOBIC) 15 MG tablet Take 1 tablet (15 mg total) by mouth daily as needed for Pain. 30 tablet 2    metoprolol succinate (TOPROL-XL) 25 MG 24 hr tablet Take 25 mg by mouth every evening.      metoprolol succinate (TOPROL-XL) 50 MG 24 hr tablet Take 1 tablet (50 mg total) by mouth every morning. 90 tablet 1    mupirocin (BACTROBAN) 2 % ointment Apply topically 3 (three) times daily.      OMEGA-3 ACID ETHYL ESTERS ORAL Take 1 capsule  "by mouth once daily.       tadalafiL (CIALIS) 20 MG Tab Take 1 tablet (20 mg total) by mouth as needed (as needed prior to intercourse). 30 tablet 5    tamsulosin (FLOMAX) 0.4 mg Cap Take 1 capsule (0.4 mg total) by mouth nightly. 90 capsule 0    amlodipine-benazepril 5-20 mg (LOTREL) 5-20 mg per capsule Take 1 capsule by mouth once daily. 90 capsule 1    atorvastatin (LIPITOR) 20 MG tablet Take 1 tablet (20 mg total) by mouth every evening. 90 tablet 1     No current facility-administered medications for this visit.       Review of patient's allergies indicates:  No Known Allergies  Objective:      Blood pressure 110/70, pulse 84, temperature 99.1 °F (37.3 °C), height 5' 9" (1.753 m), weight (!) 140.6 kg (310 lb), SpO2 97 %. Body mass index is 45.78 kg/m².   Physical Exam  Vitals and nursing note reviewed.   Constitutional:       General: He is not in acute distress.     Appearance: Normal appearance. He is well-developed. He is obese. He is not ill-appearing.   HENT:      Head: Normocephalic and atraumatic.      Right Ear: External ear normal. No middle ear effusion.      Left Ear: External ear normal.  No middle ear effusion.      Nose: Nose normal.      Right Turbinates: Not swollen.      Left Turbinates: Not swollen.      Mouth/Throat:      Lips: Pink.      Mouth: Mucous membranes are moist.      Pharynx: Uvula midline. No oropharyngeal exudate.   Eyes:      General: Lids are normal.      Extraocular Movements: Extraocular movements intact.      Conjunctiva/sclera: Conjunctivae normal.      Pupils: Pupils are equal, round, and reactive to light.   Cardiovascular:      Rate and Rhythm: Normal rate and regular rhythm.      Pulses: Normal pulses.      Heart sounds: Normal heart sounds, S1 normal and S2 normal. No murmur heard.  Pulmonary:      Effort: Pulmonary effort is normal. No respiratory distress.      Breath sounds: Normal breath sounds and air entry. No stridor. No decreased breath sounds, wheezing or " rales.   Abdominal:      General: Bowel sounds are normal.      Palpations: Abdomen is soft.      Tenderness: There is no abdominal tenderness.   Musculoskeletal:         General: Normal range of motion.      Cervical back: Normal range of motion and neck supple.   Lymphadenopathy:      Cervical: No cervical adenopathy.   Skin:     General: Skin is warm and dry.      Findings: No rash.   Neurological:      General: No focal deficit present.      Mental Status: He is alert and oriented to person, place, and time. Mental status is at baseline.   Psychiatric:         Mood and Affect: Mood normal.         Speech: Speech normal.         Behavior: Behavior normal.         Thought Content: Thought content normal.         Judgment: Judgment normal.             Assessment:       1. Essential (primary) hypertension    2. Acquired hypothyroidism    3. Paroxysmal atrial fibrillation    4. Dyslipidemia    5. Impaired fasting glucose    6. Vitamin D insufficiency    7. Need for vaccination    8. Class 3 severe obesity due to excess calories with serious comorbidity and body mass index (BMI) of 45.0 to 49.9 in adult            Plan:       Marcus was seen today for diabetes, hypertension, hyperlipidemia and follow-up.    Diagnoses and all orders for this visit:    Essential (primary) hypertension  -     Microalbumin/Creatinine Ratio, Urine; Future  -     CBC Auto Differential; Future  -     Comprehensive Metabolic Panel; Future  -     Urinalysis; Future  -     Microalbumin/Creatinine Ratio, Urine  -     CBC Auto Differential  -     Comprehensive Metabolic Panel  -     Urinalysis  -     amlodipine-benazepril 5-20 mg (LOTREL) 5-20 mg per capsule; Take 1 capsule by mouth once daily.  Lifestyle changes: Reduce the amount of salt in your diet; Lose weight; Avoid drinking too much alcohol; Exercise at least 30 minutes per day most days of the week.  Continue current medications and home BP monitoring.      Acquired hypothyroidism  -      TSH; Future  -     T4, Free; Future  -     TSH  -     T4, Free    Paroxysmal atrial fibrillation  Stable    Dyslipidemia  -     Lipid Panel; Future  -     Lipid Panel  -     atorvastatin (LIPITOR) 20 MG tablet; Take 1 tablet (20 mg total) by mouth every evening.  Stable  Continue current medication    Impaired fasting glucose  -     Hemoglobin A1C; Future  -     Hemoglobin A1C  Discussed Lifestyle Modifications: Low glycemic index diet, Exercise (30-45 min) daily, Weight loss    Vitamin D insufficiency  -     Vitamin D; Future  -     Vitamin D    Need for vaccination  -     Influenza - Quadrivalent - High Dose (65+) (PF) (IM)    Class 3 severe obesity due to excess calories with serious comorbidity and body mass index (BMI) of 45.0 to 49.9 in adult  The patient is asked to make an attempt to improve diet and exercise patterns to aid in medical management of this problem.               Follow up with me in 6 months with labs prior to office visit for chronic condition management.

## 2023-12-05 RX ORDER — TAMSULOSIN HYDROCHLORIDE 0.4 MG/1
CAPSULE ORAL
Qty: 90 CAPSULE | Refills: 3 | Status: SHIPPED | OUTPATIENT
Start: 2023-12-05 | End: 2024-03-21

## 2023-12-12 NOTE — TELEPHONE ENCOUNTER
Please let pt know I reviewed his path and spoke with .  did their biopsy on 5/20 and met with them last week to go over path. His plan is for surgery 4-6 weeks after the biopsy due to the inflammation that can occur after the biopsy which can make surgery more difficult.    Also let him know blood in the semen is expected for 6 weeks after biopsy.      This is called a prednisone burst. It is a short period of time and does not need to be tapered.     Please call Lamar and let her know.    Thanks!    Shaniqua Grove PA-C  [facility]

## 2023-12-12 NOTE — TELEPHONE ENCOUNTER
OFFICE VISIT      Patient: Rufina Wayne   : 1971 MRN: [id number]    SUBJECTIVE:  Chief Complaint   Patient presents with    Office Visit       A 52 year old female is here for an evaluation of abdominal lump.    Patient has given consent to record this visit for documentation in their clinical record.    HISTORY OF PRESENT ILLNESS:  Ventral hernia without obstruction or gangrene: Complains of abdominal pain which she feels like a muscle pain. She is doing abdominal exercise, and felt a nodule yesterday while doing exercise. She felt one more nodule again today in the area of her previous hernia surgery. She feels like a pant rubbing over the area.  She has a history of ventral hernia repair surgery on 2021. She has lost 80 lb over the last two years.      Additional comments:  Immunization: She is due for tetanus booster vaccine, shingles vaccine, COVID-19 vaccine booster dose, and hepatitis B vaccine booster. She has an appointment scheduled for vein surgery tomorrow, and wants to postpone her vaccination.         PAST MEDICAL HISTORY:  Past Medical History:   Diagnosis Date    Arthritis     Exercise-induced asthma     no iinhalers    Migraine     PONV (postoperative nausea and vomiting)     Sleep apnea     cpap    Superficial thrombophlebitis     after vein surgery    TMJ syndrome     Vitamin B12 deficiency      MEDICATIONS:  No current outpatient medications on file.     No current facility-administered medications for this visit.     ALLERGIES:  ALLERGIES:   Allergen Reactions    Ibuprofen HIVES and SWELLING     Hives and lip swelling    Macrolides And Ketolides GI UPSET    Morphine RASH     PAST SURGICAL HISTORY:  Past Surgical History:   Procedure Laterality Date    Breast cyst aspiration Left 2016    benign     section, classic      x 2     section, low transverse   &     D and c       X 5    Endometrial ablation  2008    Knee surgery  3/1995 & 10/2010     Spoke with preservice about procedure scheduled on Monday 1/28. Patient does not have surgery benefits. Denied . Please advise    Laparoscopy,abdm,myesha,oment,dx  02/24/2016    Laparoscopy    Laparoscopy,cholyst w/ cholgpy  08/21/2002    Lysis of vaginal adhesions  02/24/2016    Lysis of Adhesions    Open access colonoscopy  10/04/2021    Pelvic laparoscopy  02/24/2010    L salpingo oophorectomy    Removal of tonsils,12+ y/o  1991    Salpingoophorectomy  02/24/2016    Right Salpingo Oophorectomy    Shoulder surgery Left 12/2017    Stab phlebectomy veins < 10 incisions Right 12/18/2015    10 incisions: Kavita England MD, & 1 vein removed in office 8/4/16    Tonsillectomy and adenoidectomy      Tubal ligation  04/20/2005    Vaginal hysterectomy  07/18/2011    Varicose vein surgery Right 12/18/2015    greater saphenous vein w/ RFA    Vein surgery Right 07/18/2022    Radiofrequency ablation of the right anterior accessory vein    Ventral hernia repair  07/18/2011     FAMILY HISTORY:  Family History   Problem Relation Age of Onset    Heart disease Father     Hypertension Father     Diabetes Maternal Grandmother     Stroke Maternal Grandmother     Cancer Maternal Grandfather         colon cancer    Other Daughter         anxiety    Other Son         depression    NEGATIVE FAMILY HX OF Mother      SOCIAL HISTORY:  Social History     Tobacco Use   Smoking Status Never   Smokeless Tobacco Never     Social History     Substance and Sexual Activity   Alcohol Use Yes    Alcohol/week: 0.0 - 2.0 standard drinks of alcohol       Review of Systems    All other systems reviewed and were negative.     Gastrointestinal: As Per HPI.      OBJECTIVE:  Vitals:    12/11/23 1532   BP: 118/70   BP Location: LUE - Left upper extremity   Patient Position: Sitting   Cuff Size: Regular   Pulse: 62   Weight: 71.9 kg (158 lb 9.6 oz)   Height: 5' 4\"     Body mass index is 27.22 kg/m².    Physical Exam  Constitutional: Alert, in no acute distress, and current vital signs reviewed.  HEENT: Atraumatic and normocephalic. No discharge, no eyelid swelling, and the sclerae were  normal. Normal appearing outer ear, normal appearing nose, and normal lips.  Neck: Normal appearing neck and supple neck.  Respiratory: Breath sounds clear to auscultation bilaterally, but no respiratory distress and normal respiratory rate and effort.  Cardiovascular: Normal rate, regular rhythm, normal S1, normal S2, and edema was not present in the lower extremities.  Abdomen:  No left or right lower quadrant pain, diastasis recti noted, bump on the umbilical area.  Psychiatric: Alert and awake, interactive, and mood/affect were appropriate.  Skin, Hair, Nails: Normal skin color and pigmentation.      DIAGNOSTIC STUDIES:  LAB RESULTS:  No visits with results within 1 Month(s) from this visit.   Latest known visit with results is:   Lab Services on 08/04/2023   Component Date Value Ref Range Status    Vitamin D, 25-Hydroxy 08/04/2023 68.7  30.0 - 100.0 ng/mL Final    Vitamin B12 08/04/2023 589  211 - 911 pg/mL Final    Homocysteine, Total 08/04/2023 7.2  mcmol/L Final    Iron 08/04/2023 105  50 - 170 mcg/dL Final    Ferritin 08/04/2023 121  8 - 252 ng/mL Final    TSH 08/04/2023 2.480  0.350 - 5.000 mcUnits/mL Final    Thyroid Peroxidase Antibody 08/04/2023 <28  <=60 Units/mL Final       ASSESSMENT AND PLAN:  This is a 52 year old female who presents with :  1. Ventral hernia without obstruction or gangrene        Orders Placed This Encounter    US SOFT TISSUE ABDOMINAL WALL       Plan:  Ventral hernia without obstruction or gangrene:  Lump noticed could be a hernia mesh of the previous surgery after weight loss.   Ordered US soft tissue abdominal wall.    Additional plan:  Immunization:  Advised to take due vaccinations after surgery.        Return if symptoms worsen or fail to improve.        Medication list reconciled, vital signs, problem list, allergies, medical/ surgical/ family/ social history reviewed from the EHR.    Refer to orders.  Medical compliance with plan discussed and risks of non-compliance  reviewed.  Patient education completed on disease process, etiology & prognosis.  Proper usage and side effects of medications reviewed & discussed.  Patient understands and agrees with the plan.  Return to clinic as clinically indicated as discussed with patient who verbalized understanding of the plan and is in agreement with the plan.    IJonn, have created a visit summary document based on the audio recording between Dr. Patricia Solorzano MD and this patient for the physician to review, edit as needed, and authenticate.  Creation Date: 12/12/2023 Time: 5:43 AM

## 2024-01-08 RX ORDER — LEVOTHYROXINE SODIUM 50 UG/1
50 TABLET ORAL
Qty: 90 TABLET | Refills: 3 | Status: SHIPPED | OUTPATIENT
Start: 2024-01-08

## 2024-03-11 ENCOUNTER — LAB VISIT (OUTPATIENT)
Dept: LAB | Facility: HOSPITAL | Age: 69
End: 2024-03-11
Attending: UROLOGY
Payer: MEDICARE

## 2024-03-11 DIAGNOSIS — N20.0 NEPHROLITHIASIS: ICD-10-CM

## 2024-03-11 DIAGNOSIS — C61 MALIGNANT NEOPLASM OF PROSTATE: ICD-10-CM

## 2024-03-11 LAB — COMPLEXED PSA SERPL-MCNC: 0.12 NG/ML (ref 0–4)

## 2024-03-11 PROCEDURE — 84153 ASSAY OF PSA TOTAL: CPT | Performed by: UROLOGY

## 2024-03-11 PROCEDURE — 36415 COLL VENOUS BLD VENIPUNCTURE: CPT | Performed by: UROLOGY

## 2024-03-21 ENCOUNTER — OFFICE VISIT (OUTPATIENT)
Dept: UROLOGY | Facility: CLINIC | Age: 69
End: 2024-03-21
Payer: MEDICARE

## 2024-03-21 VITALS
SYSTOLIC BLOOD PRESSURE: 129 MMHG | WEIGHT: 309.94 LBS | HEIGHT: 69 IN | DIASTOLIC BLOOD PRESSURE: 85 MMHG | BODY MASS INDEX: 45.91 KG/M2 | HEART RATE: 79 BPM

## 2024-03-21 DIAGNOSIS — C61 PROSTATE CANCER: Primary | ICD-10-CM

## 2024-03-21 DIAGNOSIS — N20.0 NEPHROLITHIASIS: ICD-10-CM

## 2024-03-21 DIAGNOSIS — N32.0 BLADDER NECK CONTRACTURE: ICD-10-CM

## 2024-03-21 DIAGNOSIS — N52.1 ERECTILE DYSFUNCTION DUE TO DISEASES CLASSIFIED ELSEWHERE: ICD-10-CM

## 2024-03-21 LAB
BILIRUBIN, UA POC OHS: NEGATIVE
BLOOD, UA POC OHS: ABNORMAL
CLARITY, UA POC OHS: CLEAR
COLOR, UA POC OHS: YELLOW
GLUCOSE, UA POC OHS: NEGATIVE
KETONES, UA POC OHS: NEGATIVE
LEUKOCYTES, UA POC OHS: NEGATIVE
NITRITE, UA POC OHS: NEGATIVE
PH, UA POC OHS: 7
PROTEIN, UA POC OHS: NEGATIVE
SPECIFIC GRAVITY, UA POC OHS: 1.01
UROBILINOGEN, UA POC OHS: 2

## 2024-03-21 PROCEDURE — 81003 URINALYSIS AUTO W/O SCOPE: CPT | Mod: QW,HCNC,S$GLB, | Performed by: UROLOGY

## 2024-03-21 PROCEDURE — 3288F FALL RISK ASSESSMENT DOCD: CPT | Mod: HCNC,CPTII,S$GLB, | Performed by: UROLOGY

## 2024-03-21 PROCEDURE — 99999 PR PBB SHADOW E&M-EST. PATIENT-LVL III: CPT | Mod: PBBFAC,HCNC,, | Performed by: UROLOGY

## 2024-03-21 PROCEDURE — 1101F PT FALLS ASSESS-DOCD LE1/YR: CPT | Mod: HCNC,CPTII,S$GLB, | Performed by: UROLOGY

## 2024-03-21 PROCEDURE — 1126F AMNT PAIN NOTED NONE PRSNT: CPT | Mod: HCNC,CPTII,S$GLB, | Performed by: UROLOGY

## 2024-03-21 PROCEDURE — 3008F BODY MASS INDEX DOCD: CPT | Mod: HCNC,CPTII,S$GLB, | Performed by: UROLOGY

## 2024-03-21 PROCEDURE — 1159F MED LIST DOCD IN RCRD: CPT | Mod: HCNC,CPTII,S$GLB, | Performed by: UROLOGY

## 2024-03-21 PROCEDURE — 1160F RVW MEDS BY RX/DR IN RCRD: CPT | Mod: HCNC,CPTII,S$GLB, | Performed by: UROLOGY

## 2024-03-21 PROCEDURE — 3074F SYST BP LT 130 MM HG: CPT | Mod: HCNC,CPTII,S$GLB, | Performed by: UROLOGY

## 2024-03-21 PROCEDURE — 99214 OFFICE O/P EST MOD 30 MIN: CPT | Mod: HCNC,S$GLB,, | Performed by: UROLOGY

## 2024-03-21 PROCEDURE — G2211 COMPLEX E/M VISIT ADD ON: HCPCS | Mod: HCNC,S$GLB,, | Performed by: UROLOGY

## 2024-03-21 PROCEDURE — 3079F DIAST BP 80-89 MM HG: CPT | Mod: HCNC,CPTII,S$GLB, | Performed by: UROLOGY

## 2024-03-21 RX ORDER — TADALAFIL 5 MG/1
5 TABLET ORAL DAILY
Qty: 90 TABLET | Refills: 3 | Status: SHIPPED | OUTPATIENT
Start: 2024-03-21 | End: 2025-03-21

## 2024-03-21 RX ORDER — TADALAFIL 20 MG/1
20 TABLET ORAL
Qty: 15 TABLET | Refills: 2 | Status: SHIPPED | OUTPATIENT
Start: 2024-03-21 | End: 2024-06-03

## 2024-03-21 NOTE — PROGRESS NOTES
[facility], formerly known as [facility]  Group MD's:Mary/Fanny/Micha/Leslie/Jyotsna  Group NP's: Francoise Rubin, Briseida Zapata    PCP: Lois Arevalo FNP-C  Date of Service: 03/21/2024  Today's note written by: MD Mary       Chief Complaint: prostate cancer     Subjective:        HPI: Marcus Praisi is a 64 y.o. male presents with  Prostate cancer, T2c Gl3+3, T2c psa 2.9, NxMx prostate nodules, B apex and MH.  He was initially seen by me in 2018 for rising psa.   He was found to have Gl 3+3 in 4/14 cores Jan 2019 and was referred due to insurance issues to .    took him for a RALP on 7/1/20. Gl 4+3 with teriary 5 in <5%. Tumor involves 20 to 25% of prostate. Right apical and Left posterior margins involved by cancer.  Also had a h/o stones 10+ years ago. Had a stent but leaked continuously   9/11/21 psa at [facility] <0.1  10/27/21: Saw francoise on 10/27/21 and was c/o gross hematuria. CTU showed mid to distal L ureteral 3mm stone. Pt wanted it removed.     Interval history 11/3/21  He is here in preop and says he hasn't had any hematuria in 3 to 4 days. He's had no flank pain in 3 weeks. He never received flomax ([facility]). He also has not passed stone.  Prostate cancer- still has some incontinence  He was discharged on flomax with a plan for a repeat ctrss     Interval history 11/10/21:  ctrss 11/8/21 showed L ureteral 4mm stone in same position with moderate hydro, no left renal stones. 2, 2mm stones in right kidney. Long bladder . Therefore here today for cystoscopy and ureteroscopy to extract stone as long as no infection beyond stone   psa is 0.03 (no previous super sensitive psa's to go by)  Still with LUIS ENRIQUE        Interval history 2/8/22:  11/10/21 cysto L urs and stone extraction. Stone was not radioopaque. Inflammation around stone. Found to have BNC. Left stent on strings. Stones mostly calcium oxalate.   1/25/22 psa up to  0.04  rbus 2/3/22: 8mm stone on L wo hydro. No stone on right  kub 2/3/22: no stones seen   Hasn't passed any stones. Didn't notice much diffference after procedure  (no increased flow) and denies any slow flow. Denies any frequency or ugency. Still has LUIS ENRIQUE.     Interval history since last visit with me:  Uroflow results (date: 02/16/2022): Voiding time: 21.1s, Flow time: 17.2s, TTP flow: 9.3s, Peak flowrate: 24.1 mL/s, Average flowrate: 13.5mL/s, Intervals: 2, Voided volume: 233 mL, Pvr by bladder scan: 0mL . Pattern of curve: see uploaded image, reviewed by     5/17/122 0.04    5/24/22: He apparently is using cialis 20mg daily. Should not be doing this daily. Not interested in LAURA. Not a good candidate for inection. On eliquis.     Interval history 11/29/22:  Had a psa done on 11/22/22 and was 0.05. still urinating well with no issues.   Using cialis as needed now, not daily. Doing well on this.   Ua today with tr bld but no uti sx. No flank pain.     Interval history 3/6/23:  He returns today with a repeat PSA done on 02/28/2023, up to 0.07 now.  He also had a kidney ultrasound on 02/28/2023 showing one  6 mm stone in each kidney, KUB (Xray of the abdomen to look for stones) 02/28/2023 showing no stones  Voided urine today shows trace blood.  Denies any flank pain or gross hematuria.  No back pain.  Stays active.on cialis as needed.   History of bladder neck contracture but denies any difficulty urinating.  Good flow.  He says that he has been having stress incontinence since his prostatectomy however his nighttime incontinence has improved significantly.  Incontinence most noticeable with strenuous activity.  Wears 1 depends or liner during the day.  nighttime depends now dry. Only urinates 3x during the day. Does do kegels.         Interval history 6/20/23:  Here today to discuss his PSA.  We have been monitoring it since it is slowly rising.  PSA up to 0.09.  0.02 higher than 3 months ago.  Has not had any  back pain.  Also of note his urine today shows large blood.  Last time it only showed trace blood.  He denies any gross hematuria.  He is on Eliquis.  Denies any flank pain.  Plan was to get an x-ray in February.    Interval history 7/10/23:  6/20/23 ua showed large blood, 20 rbc- scheduled him for ctu and cysto.  6/27/23 called c/o uti sx. Urine sent for u/a, urine culture and urine cytology.   6/27/23 cytology neg  Ucx 6/27/23: e.coli. ua with 3+leuk/nit+/3+bld, ua neil - 20 rbc/>100 wbc/mod bact, tx with cipro bid x 10d. Finished a few days ago.   Had another psa on 7/1/23 and now up to 0.14  Ctu 7/1/23 for uti.   1. Cholelithiasis.- gallstones, see pcp or GI if having pain in right upper quadrant, nausea or vomiting  2. Splenomegaly.- large spleen, he can see pcp for this  3. Small nonobstructing right renal calculi.- tiny stones on right can cause blood in urine  4. Small subcentimeter renal cysts.- nothing to do for this      Cystoscopy on 7/10/23: no stricture and no tumors    Interval history by ME in CLINIC on 9/21/213 for prostate cancer fu and stone history:  Today he staesGood stream (does have a h/o bladder neck contracture). Pvr: 32  Independent Review of Ctu 7/1/23 again today:  shows 4 tiny stones on right, non obstructing. No ureteral cyst. Cystic lesion in the pelvis, stable. .  Small chronic stable 3.2 cm cystic lesion of the left paramedian pelvis at the level of the pubic symphysis again slightly displacing the bladder.            Psa 9/14/23 back down to 0.10 (could have been elevated due prostatitis.)  Ua today with tr bld    Interval history by ME/ in CLINIC on 3/21/24 for prostate cancer, kidney stones, ED:  Kidney stones- 4 tiny right stones. Denies any R flank pain. No GH. Ua today with r bld only.   BNC with slow flow:  at last visit told him he could stop the flomax if he wants to see if it made a difference but he said that he didn't want to discontinue bc he felt like it  had helped when he first started and doesn' mind taking.   ED: at last visit was on cialis 5mg daily and that's what my plan had said. He said that I had told him not to take it every day. On his med list he has 20mg. He said that he was taking cialis once daily up until a month ago and he ran out.  H/o Prostate cancer, Gl 4+3 with tertiary 5 in <5% s/p ralp in 7/2020 with + margins.  Recent psa rise likely due to prostatitis.  Psa ginny by 0.02 over 6 months. Previously was rising 0.02 every 3 months.         psa history   3/11/24  0.12  9/21/23 Pvr: 32  9/14/23 0.10  7/1/23  0.14  6/6/23  0.09  2/28/23 0.07  11/22/22 0.05  5/24/22  pvr by scan: 11  5/17/22 0.04   2/8/22  pvr by scan: 0  1/25/22 0.04  11/8/21 0.03  9/11/21 <0.1   7/31/20 RALP Gl 4+3 with teriary 5 in <5%. Tumor involves 20 to 25% of prostate.   1/28/19  psa 2.9, trus vol: 40.5g, T2c (b palp nodules), Gl 3+3 in RBM, LML, LONDONO, LTZ (4/14).  HGPIN in  RBL, RBM (2/14). ASAP in [facility]LLY (1/14)  01/14/19          2.9, %free 15.5, JAHAIRA: 20g, definite nodule on left apex and one on right apex.   10/9/18            JAHAIRA: 20g gland without any discrete masses, left side however more firm, no tenderness.  9/18/18            2.1  9/2017             1.2      Urine history: eliquis for afib. No tobacco hx. + stones.   9/21/23 Tr bld  7/10/23 Neg  6/27/23 E.coli, 3+bld/nit+/3+leuk, 20 rbc/>100 wbc/mod bacter  6/20/23 Large bld, 20 rbc/few bact  3/6/23  Tr bld  5/24/22 Tr bld  02/8/22 neg  11/10/21 90%calcium oxalate/10% calcium phosphate  10/27/21 Ng, void: red, 3+bld, >100 rbc, cyt: neg.   6/19/21 coreen  1/28/19 Ng, void: tr wbc- no sx of uti  1/21/19 ng, void: neg  1/15/19 No cx, void: tr blood, cytology: negative  10/9/18            No cx, void: neg       REVIEW OF SYSTEMS:neg  Objective:      Vitals:    03/21/24 1256   BP: 129/85   Pulse: 79               Assessment:         Marcus Parisi is a 69 y.o. male with     1. Prostate cancer    2. Nephrolithiasis    3. Erectile  dysfunction due to diseases classified elsewhere    4. Bladder neck contracture           Plan:     H/o Prostate cancer, Gl 4+3 with tertiary 5 in <5% s/p ralp in 7/2020 with + margins.    Psa still rising, but very slowly. Now 0.02 over 6 months  Can change to 6 months psa  If psa rises > 0.2 can consider PSMA    Kidney stones- 4 tiny right stones  Xray and ultrasound in September  Take flomax if passing a stone to help pass the stone    Ed-   Send in tadalafil [facility]mg daily [facility]. Call them.   Ok to use 20mg as needed in addition to 5mg if no improvement after like 2 weeks  Only use 20mg as needed on top of the 5mg 1x -2x a week max at least 3 days apart     Discontinue flomax. Its for prostate and he doesn't have a prostate. Let me know if flow gets slower      F/u in 6 months with diagnostic psa prior, xray and ultrasound prior                     Generally speaking, PSMA PET Scans using PSMA (18F-[id number]) have been shown to be effective at PSA levels above 0.2ng/mL. Also generally speaking, the higher the PSA level (when restaging patients), the more likely a PSMA PET Scan is to detect and identify recurrent prostate cancer.  psa diagnostic in 6 months and f/u after (standing orders in).   But if psa rising, will order prostate mri to eval for any local recurrence at sites of positive margins.         PSA doubling time info:  https://www.mdcalc.com/calc/26172/psa-doubling-time-psadt-calculator#evidence  https://jamanetwork.com/journals/kristin/fullarticle/588056  The length of time after surgery prior to biochemical recurrence was important in determining the risk of eventual distant failure for men with lower (5, 6, and 7) and men with high (8, 9, and 10) Maricarmen scores (Figure 4). A similar observation was made previously by Rahul et al22 in a report demonstrating that a high [place] score and advanced pathologic stage were important in determining the likelihood of local or distant failure. Using a  cutoff of 10 months, PSADT provided further substratification for men with a Maricarmen score of less than 8. Men with rapid PSA level elevation (<2 years), a [place] score of 5 to 7, and a PSADT (>10 months) demonstrated a 76% probability of remaining free of metastatic disease for 5 years following initial PSA level elevation compared with men with a shorter PSADT (<10 months) who had only 35% chance of remaining free of metastatic disease for 5 years after biochemical recurrence. Although not as strong as Maricarmen score, time of biochemical recurrence, and PSADT, the pathologic stage did contribute to the likelihood of distant metastasis (P=.01). The PSADT has been suggested by Kong et al13 as a useful predictor of the type of eventual recurrence after radical prostatectomy. They measured the PSADT for a group of 77 men with biochemical recurrence following radical retropubic prostatectomy and found that shorter PSADTs (<6 months) were more indicative of distant disease when compared with local recurrence.

## 2024-03-21 NOTE — PATIENT INSTRUCTIONS
Marcus Parisi is a 69 y.o. male with     1. Prostate cancer    2. Nephrolithiasis    3. Erectile dysfunction due to diseases classified elsewhere    4. Bladder neck contracture           Plan:     H/o Prostate cancer, Gl 4+3 with tertiary 5 in <5% s/p ralp in 7/2020 with + margins.    Psa still rising, but very slowly. Now 0.02 over 6 months  Can change to 6 months psa  If psa rises > 0.2 can consider PSMA    Kidney stones- 4 tiny right stones  Xray and ultrasound in September  Take flomax if passing a stone to help pass the stone    Ed-   Send in tadalafil [facility]mg daily [facility]. Call [facility].   Ok to use 20mg as needed in addition to 5mg if no improvement after like 2 weeks  Only use 20mg as needed on top of the 5mg 1x -2x a week max at least 3 days apart     Discontinue flomax. Its for prostate and he doesn't have a prostate. Let me know if flow gets slower      F/u in 6 months with diagnostic psa prior, xray and ultrasound prior        has written you for Tadalafil (Cialis- not the name hernesto, generic) and e-scribed it to [facility].  They will either call you today or if they have not called within 2 to 3 days call them.  Should be free shipping.   I sent in a year supply         [address], [place] [address], [place] [address]  [phone]  Mon-Fri: 9a.m.-5p.m. EST  Sat-Sun: Cl    Instructions for the 5mg dose  Take one 5mg pill in the morning  if daily dosing produces no results after 3 weeks- Ok to use up to 20mg total at 1x but do not do this more than every 3 days     Instructions for the 10mg dose (30 pills for $18.50)  Can take up to 20mg total if needed  No more than 20mg in 3 to 4 days.     Instructions for the 20mg dose  Can take 1/2 pill or 20mg 30 minutes prior to intercourse.   No more than 20mg in 3 dayd,

## 2024-03-22 LAB — CRC RECOMMENDATION EXT: NORMAL

## 2024-04-11 ENCOUNTER — PATIENT OUTREACH (OUTPATIENT)
Dept: ADMINISTRATIVE | Facility: HOSPITAL | Age: 69
End: 2024-04-11
Payer: MEDICARE

## 2024-04-11 NOTE — PROGRESS NOTES
Population Health Chart Review & Patient Outreach Details      Additional [facility] Health Notes:               Updates Requested / Reviewed:      Updated Care Coordination Note, , and Care Team Updated         Health Maintenance Topics Overdue:      VBHM Score: 0     Patient is not due for any topics at this time.    Tetanus Vaccine  Shingles/Zoster Vaccine  RSV Vaccine                  Health Maintenance Topic(s) Outreach Outcomes & Actions Taken:    Colorectal Cancer Screening - Outreach Outcomes & Actions Taken  : External Records Uploaded, Care Team Updated, & History Updated if Applicable

## 2024-05-20 ENCOUNTER — PATIENT MESSAGE (OUTPATIENT)
Dept: FAMILY MEDICINE | Facility: CLINIC | Age: 69
End: 2024-05-20
Payer: MEDICARE

## 2024-05-20 DIAGNOSIS — I10 ESSENTIAL (PRIMARY) HYPERTENSION: ICD-10-CM

## 2024-05-20 DIAGNOSIS — E78.5 DYSLIPIDEMIA: ICD-10-CM

## 2024-05-21 RX ORDER — ATORVASTATIN CALCIUM 20 MG/1
20 TABLET, FILM COATED ORAL NIGHTLY
Qty: 90 TABLET | Refills: 1 | Status: SHIPPED | OUTPATIENT
Start: 2024-05-21

## 2024-05-21 RX ORDER — AMLODIPINE AND BENAZEPRIL HYDROCHLORIDE 5; 20 MG/1; MG/1
1 CAPSULE ORAL DAILY
Qty: 90 CAPSULE | Refills: 1 | Status: SHIPPED | OUTPATIENT
Start: 2024-05-21

## 2024-06-03 ENCOUNTER — OFFICE VISIT (OUTPATIENT)
Dept: FAMILY MEDICINE | Facility: CLINIC | Age: 69
End: 2024-06-03
Payer: MEDICARE

## 2024-06-03 VITALS
DIASTOLIC BLOOD PRESSURE: 60 MMHG | HEIGHT: 69 IN | WEIGHT: 310 LBS | OXYGEN SATURATION: 97 % | SYSTOLIC BLOOD PRESSURE: 110 MMHG | TEMPERATURE: 99 F | BODY MASS INDEX: 45.91 KG/M2 | HEART RATE: 71 BPM

## 2024-06-03 DIAGNOSIS — I10 ESSENTIAL (PRIMARY) HYPERTENSION: Primary | ICD-10-CM

## 2024-06-03 DIAGNOSIS — I48.0 PAROXYSMAL ATRIAL FIBRILLATION: ICD-10-CM

## 2024-06-03 DIAGNOSIS — R73.01 IMPAIRED FASTING GLUCOSE: ICD-10-CM

## 2024-06-03 DIAGNOSIS — E03.9 ACQUIRED HYPOTHYROIDISM: ICD-10-CM

## 2024-06-03 DIAGNOSIS — E55.9 VITAMIN D INSUFFICIENCY: ICD-10-CM

## 2024-06-03 DIAGNOSIS — E78.5 DYSLIPIDEMIA: ICD-10-CM

## 2024-06-03 PROCEDURE — 3008F BODY MASS INDEX DOCD: CPT | Mod: HCNC,CPTII,S$GLB, | Performed by: NURSE PRACTITIONER

## 2024-06-03 PROCEDURE — 3044F HG A1C LEVEL LT 7.0%: CPT | Mod: HCNC,CPTII,S$GLB, | Performed by: NURSE PRACTITIONER

## 2024-06-03 PROCEDURE — 1159F MED LIST DOCD IN RCRD: CPT | Mod: HCNC,CPTII,S$GLB, | Performed by: NURSE PRACTITIONER

## 2024-06-03 PROCEDURE — 3066F NEPHROPATHY DOC TX: CPT | Mod: HCNC,CPTII,S$GLB, | Performed by: NURSE PRACTITIONER

## 2024-06-03 PROCEDURE — 1160F RVW MEDS BY RX/DR IN RCRD: CPT | Mod: HCNC,CPTII,S$GLB, | Performed by: NURSE PRACTITIONER

## 2024-06-03 PROCEDURE — 4010F ACE/ARB THERAPY RXD/TAKEN: CPT | Mod: HCNC,CPTII,S$GLB, | Performed by: NURSE PRACTITIONER

## 2024-06-03 PROCEDURE — 3074F SYST BP LT 130 MM HG: CPT | Mod: HCNC,CPTII,S$GLB, | Performed by: NURSE PRACTITIONER

## 2024-06-03 PROCEDURE — 3288F FALL RISK ASSESSMENT DOCD: CPT | Mod: HCNC,CPTII,S$GLB, | Performed by: NURSE PRACTITIONER

## 2024-06-03 PROCEDURE — 1101F PT FALLS ASSESS-DOCD LE1/YR: CPT | Mod: HCNC,CPTII,S$GLB, | Performed by: NURSE PRACTITIONER

## 2024-06-03 PROCEDURE — 3078F DIAST BP <80 MM HG: CPT | Mod: HCNC,CPTII,S$GLB, | Performed by: NURSE PRACTITIONER

## 2024-06-03 PROCEDURE — 3061F NEG MICROALBUMINURIA REV: CPT | Mod: HCNC,CPTII,S$GLB, | Performed by: NURSE PRACTITIONER

## 2024-06-03 PROCEDURE — 99999 PR PBB SHADOW E&M-EST. PATIENT-LVL V: CPT | Mod: PBBFAC,HCNC,, | Performed by: NURSE PRACTITIONER

## 2024-06-03 PROCEDURE — 99214 OFFICE O/P EST MOD 30 MIN: CPT | Mod: HCNC,S$GLB,, | Performed by: NURSE PRACTITIONER

## 2024-06-03 PROCEDURE — 1126F AMNT PAIN NOTED NONE PRSNT: CPT | Mod: HCNC,CPTII,S$GLB, | Performed by: NURSE PRACTITIONER

## 2024-06-03 NOTE — PROGRESS NOTES
Subjective:       Patient ID: Marcus Parisi is a 69 y.o. male.    Chief Complaint: Hypertension, Hyperlipidemia, Thyroid Problem, and Follow-up    Patient presents today for six-month follow-up for chronic conditions including hypertension, hyperlipidemia, thyroid problem, and obesity.    Labs are within acceptable range and patient will be informed today. He is closely following with cardiology.  Patient is obese with a BMI of 45.78      Hypertension  This is a chronic problem. The current episode started more than 1 month ago. The problem has been waxing and waning since onset. The problem is controlled. Pertinent negatives include no anxiety, blurred vision, chest pain, headaches, neck pain, orthopnea, palpitations, peripheral edema, PND, shortness of breath or sweats. There are no associated agents to hypertension. Risk factors for coronary artery disease include stress, obesity, dyslipidemia and male gender. Past treatments include calcium channel blockers and ACE inhibitors. The current treatment provides significant improvement. Compliance problems include exercise and diet.  There is no history of angina or kidney disease. Identifiable causes of hypertension include a thyroid problem. There is no history of chronic renal disease.   Thyroid Problem  Presents for follow-up visit. Patient reports no anxiety, cold intolerance, constipation, depressed mood, diarrhea, heat intolerance, palpitations, weight gain or weight loss. The symptoms have been worsening. His past medical history is significant for hyperlipidemia.   Hyperlipidemia  This is a recurrent problem. The current episode started more than 1 month ago. The problem is controlled. Recent lipid tests were reviewed and are variable. Exacerbating diseases include hypothyroidism and obesity. He has no history of chronic renal disease. Factors aggravating his hyperlipidemia include fatty foods. Pertinent negatives include no chest pain, focal weakness, leg  pain or shortness of breath. Current antihyperlipidemic treatment includes statins. The current treatment provides moderate improvement of lipids. Compliance problems include adherence to exercise and adherence to diet.  Risk factors for coronary artery disease include stress, dyslipidemia, male sex, hypertension and obesity.   Erectile Dysfunction  This is a chronic problem. The current episode started more than 1 year ago. The problem has been waxing and waning since onset. The nature of his difficulty is achieving erection and maintaining erection. He reports no anxiety or decreased libido. He reports his erection duration to be 1 to 5 minutes. Irritative symptoms do not include urgency. Pertinent negatives include no dysuria or hematuria. The symptoms are aggravated by stress. Past treatments include tadalafil. The treatment provided moderate relief. He has been using treatment for 1 to 2 years. He has had no adverse reactions caused by medications. Risk factors include hypertension.   Diabetes  He presents for his follow-up (prediabetes) diabetic visit. He has type 2 diabetes mellitus. His disease course has been worsening. There are no hypoglycemic associated symptoms. Pertinent negatives for hypoglycemia include no confusion, dizziness, headaches, nervousness/anxiousness or sweats. There are no diabetic associated symptoms. Pertinent negatives for diabetes include no blurred vision, no chest pain, no polydipsia, no polyuria, no weakness and no weight loss. There are no hypoglycemic complications. There are no diabetic complications. Risk factors for coronary artery disease include dyslipidemia, diabetes mellitus, hypertension, obesity, stress and sedentary lifestyle. Current diabetic treatment includes diet. He is compliant with treatment most of the time. His weight is fluctuating minimally. He is following a generally healthy diet. Meal planning includes avoidance of concentrated sweets. He participates in  exercise intermittently. His home blood glucose trend is fluctuating minimally.     Review of Systems   Constitutional:  Negative for activity change, appetite change, unexpected weight change, weight gain and weight loss.        Obesity   HENT:  Negative for ear discharge, ear pain, hearing loss, postnasal drip, rhinorrhea, sinus pain, trouble swallowing and voice change.         Ear fullness, decreased hearing   Eyes:  Negative for blurred vision, photophobia, pain, discharge and visual disturbance.   Respiratory:  Negative for chest tightness, shortness of breath and wheezing.    Cardiovascular:  Negative for chest pain, palpitations, orthopnea, leg swelling and PND.        Hypertension, hyperlipidemia, afib   Gastrointestinal:  Negative for blood in stool, constipation, diarrhea and vomiting.   Endocrine: Negative for cold intolerance, heat intolerance, polydipsia and polyuria.        Hypothyroid   Genitourinary:  Negative for decreased libido, difficulty urinating, dysuria, flank pain, hematuria and urgency.        Prostate cancer history, ED   Musculoskeletal:  Negative for arthralgias, gait problem, joint swelling and neck pain.   Allergic/Immunologic: Negative for immunocompromised state.   Neurological:  Negative for dizziness, focal weakness, weakness and headaches.   Hematological:  Negative for adenopathy.   Psychiatric/Behavioral:  Negative for agitation, confusion, dysphoric mood, self-injury and suicidal ideas. The patient is not nervous/anxious.        Past Medical History:   Diagnosis Date    A-fib     Allergy     Anticoagulant long-term use     Arthritis     Asthma     as a child    Hyperlipidemia     Hypertension     Personal history of colonic polyps 03/22/2024    Prostate cancer 2019    Sleep apnea     Use CPAP    Sleep apnea     uses C-PAP      Past Surgical History:   Procedure Laterality Date    CARPAL TUNNEL RELEASE Bilateral     COLONOSCOPY  03/22/2024    3 YR RECALL    CYSTOSCOPY N/A  2019    Procedure: CYSTOSCOPY;  Surgeon: Delmy Hernandez MD;  Location: [facility] OR;  Service: Urology;  Laterality: N/A;    CYSTOSCOPY N/A 07/10/2023    Procedure: CYSTOSCOPY;  Surgeon: Delmy Hernandez MD;  Location: [facility] OR;  Service: Urology;  Laterality: N/A;    CYSTOURETEROSCOPY WITH RETROGRADE PYELOGRAPHY AND INSERTION OF STENT INTO URETER Left 11/10/2021    Procedure: CYSTOURETEROSCOPY, WITH RETROGRADE PYELOGRAM AND URETERAL STENT INSERTION;  Surgeon: Delmy Hernandez MD;  Location: [facility] OR;  Service: Urology;  Laterality: Left;  please make 1st patient    INSERTION OF IMPLANTABLE LOOP RECORDER N/A 2021    Procedure: Insertion, Implantable Loop Recorder;  Surgeon: Almaz Rivas MD;  Location: [facility] CATH;  Service: Cardiology;  Laterality: N/A;    ROBOT-ASSISTED LAPAROSCOPIC PROSTATECTOMY USING DA LAVERN XI N/A 2020    Procedure: XI ROBOTIC PROSTATECTOMY;  Surgeon: Mejia Gold MD;  Location: [facility] OR;  Service: Urology;  Laterality: N/A;    TONSILLECTOMY      TRANSRECTAL BIOPSY OF PROSTATE WITH ULTRASOUND GUIDANCE N/A 2019    Procedure: BIOPSY, PROSTATE, RECTAL APPROACH, WITH US GUIDANCE;  Surgeon: Delmy Hernandez MD;  Location: [facility] OR;  Service: Urology;  Laterality: N/A;       Family History   Problem Relation Name Age of Onset    Cancer Mother      Asthma Mother      Diabetes Mother      Heart disease Mother      Hypertension Mother      Hypertension Father      Crohn's disease Brother      Cancer Maternal Grandmother         Social History     Socioeconomic History    Marital status:    Tobacco Use    Smoking status: Former     Current packs/day: 0.00     Average packs/day: 3.0 packs/day for 5.0 years (15.0 ttl pk-yrs)     Types: Cigarettes     Start date:      Quit date: 1975     Years since quittin.4    Smokeless tobacco: Former     Types: Chew    Tobacco comments:     quit 40 yrs ago   Substance and Sexual Activity     Alcohol use: Not Currently     Comment: occasional    Drug use: No    Sexual activity: Yes     Partners: Female     Social Determinants of Health     Financial Resource Strain: Low Risk  (6/3/2024)    Overall Financial Resource Strain (CARDIA)     Difficulty of Paying Living Expenses: Not hard at all   Food Insecurity: No Food Insecurity (6/3/2024)    Hunger Vital Sign     Worried About Running Out of Food in the Last Year: Never true     Ran Out of Food in the Last Year: Never true   Transportation Needs: No Transportation Needs (7/27/2020)    PRAPARE - Transportation     Lack of Transportation (Medical): No     Lack of Transportation (Non-Medical): No   Physical Activity: Inactive (6/3/2024)    Exercise Vital Sign     Days of Exercise per Week: 0 days     Minutes of Exercise per Session: 0 min   Stress: No Stress Concern Present (6/3/2024)    [employer] - Occupational Stress Questionnaire     Feeling of Stress : Not at all   Housing Stability: Unknown (6/3/2024)    Housing Stability Vital Sign     Unable to Pay for Housing in the Last Year: No       Current Outpatient Medications   Medication Sig Dispense Refill    amlodipine-benazepril 5-20 mg (LOTREL) 5-20 mg per capsule Take 1 capsule by mouth once daily. 90 capsule 1    apixaban (ELIQUIS) 5 mg Tab Take 1 tablet (5 mg total) by mouth 2 (two) times daily. 180 tablet 1    atorvastatin (LIPITOR) 20 MG tablet Take 1 tablet (20 mg total) by mouth every evening. 90 tablet 1    cyanocobalamin (VITAMIN B-12) 1000 MCG tablet Take 1,000 mcg by mouth once daily.       levothyroxine (SYNTHROID) 50 MCG tablet TAKE 1 TABLET (50 MCG TOTAL) BY MOUTH BEFORE BREAKFAST. 90 tablet 3    metoprolol succinate (TOPROL-XL) 25 MG 24 hr tablet Take 1 tablet (25 mg total) by mouth every evening. 90 tablet 3    metoprolol succinate (TOPROL-XL) 50 MG 24 hr tablet Take 1 tablet (50 mg total) by mouth every morning. 90 tablet 3    mupirocin (BACTROBAN) 2 %  "ointment Apply topically 3 (three) times daily.      OMEGA-3 ACID ETHYL ESTERS ORAL Take 1 capsule by mouth once daily.       tadalafiL (CIALIS) 5 MG tablet Take 1 tablet (5 mg total) by mouth once daily. Take one every morning 90 tablet 3    tamsulosin (FLOMAX) 0.4 mg Cap Take 1 capsule by mouth once daily. (Patient not taking: Reported on 6/3/2024)       No current facility-administered medications for this visit.       Review of patient's allergies indicates:  No Known Allergies  Objective:      Blood pressure 110/60, pulse 71, temperature 98.5 °F (36.9 °C), height 5' 9" (1.753 m), weight (!) 140.6 kg (310 lb), SpO2 97%. Body mass index is 45.78 kg/m².   Physical Exam  Vitals and nursing note reviewed.   Constitutional:       General: He is not in acute distress.     Appearance: Normal appearance. He is well-developed. He is obese. He is not ill-appearing.   HENT:      Head: Normocephalic and atraumatic.      Right Ear: External ear normal. No middle ear effusion.      Left Ear: External ear normal.  No middle ear effusion.      Nose: Nose normal.      Right Turbinates: Not swollen.      Left Turbinates: Not swollen.      Mouth/Throat:      Lips: Pink.      Mouth: Mucous membranes are moist.      Pharynx: Uvula midline. No oropharyngeal exudate.   Eyes:      General: Lids are normal.      Extraocular Movements: Extraocular movements intact.      Conjunctiva/sclera: Conjunctivae normal.      Pupils: Pupils are equal, round, and reactive to light.   Cardiovascular:      Rate and Rhythm: Normal rate and regular rhythm.      Pulses: Normal pulses.      Heart sounds: Normal heart sounds, S1 normal and S2 normal. No murmur heard.  Pulmonary:      Effort: Pulmonary effort is normal. No respiratory distress.      Breath sounds: Normal breath sounds and air entry. No stridor. No decreased breath sounds, wheezing or rales.   Abdominal:      General: Bowel sounds are normal.      Palpations: Abdomen is soft.      " Tenderness: There is no abdominal tenderness.   Musculoskeletal:         General: Normal range of motion.      Cervical back: Normal range of motion and neck supple.   Lymphadenopathy:      Cervical: No cervical adenopathy.   Skin:     General: Skin is warm and dry.      Findings: No rash.   Neurological:      General: No focal deficit present.      Mental Status: He is alert and oriented to person, place, and time. Mental status is at baseline.   Psychiatric:         Mood and Affect: Mood normal.         Speech: Speech normal.         Behavior: Behavior normal.         Thought Content: Thought content normal.         Judgment: Judgment normal.             Assessment:       1. Essential (primary) hypertension    2. Dyslipidemia    3. Acquired hypothyroidism    4. Paroxysmal atrial fibrillation    5. Impaired fasting glucose    6. Vitamin D insufficiency              Plan:       Marcus was seen today for hypertension, hyperlipidemia, thyroid problem and follow-up.    Diagnoses and all orders for this visit:    Essential (primary) hypertension  -     CBC Auto Differential; Future  -     Comprehensive Metabolic Panel; Future  -     Urinalysis; Future  -     CBC Auto Differential  -     Comprehensive Metabolic Panel  -     Urinalysis    Dyslipidemia  -     Lipid Panel; Future  -     Lipid Panel    Acquired hypothyroidism  -     TSH; Future  -     T4, Free; Future  -     TSH  -     T4, Free    Paroxysmal atrial fibrillation    Impaired fasting glucose  -     Hemoglobin A1C; Future  -     Microalbumin/Creatinine Ratio, Urine; Future  -     Hemoglobin A1C  -     Microalbumin/Creatinine Ratio, Urine    Vitamin D insufficiency  -     Vitamin D; Future  -     Vitamin D       Continue current medications.    I spent a total of 30 minutes on the day of the visit.This includes face to face time and non-face to face time preparing to see the patient (eg, review of tests), obtaining and/or reviewing separately obtained history,  documenting clinical information in the electronic or other health record, independently interpreting results and communicating results to the patient/family/caregiver, or care coordinator.             Follow up with me in 6 months with labs prior to office visit for chronic condition management.

## 2024-06-18 ENCOUNTER — HOSPITAL ENCOUNTER (OUTPATIENT)
Dept: RADIOLOGY | Facility: HOSPITAL | Age: 69
Discharge: HOME | End: 2024-06-18
Attending: NURSE PRACTITIONER
Payer: MEDICARE

## 2024-06-18 ENCOUNTER — OFFICE VISIT (OUTPATIENT)
Dept: FAMILY MEDICINE | Facility: CLINIC | Age: 69
End: 2024-06-18
Payer: MEDICARE

## 2024-06-18 ENCOUNTER — TELEPHONE (OUTPATIENT)
Dept: CARDIOLOGY | Facility: CLINIC | Age: 69
End: 2024-06-18
Payer: MEDICARE

## 2024-06-18 VITALS
SYSTOLIC BLOOD PRESSURE: 120 MMHG | HEART RATE: 87 BPM | DIASTOLIC BLOOD PRESSURE: 70 MMHG | OXYGEN SATURATION: 97 % | WEIGHT: 304 LBS | BODY MASS INDEX: 45.03 KG/M2 | TEMPERATURE: 99 F | HEIGHT: 69 IN

## 2024-06-18 DIAGNOSIS — Z01.818 PREOP EXAMINATION: Primary | ICD-10-CM

## 2024-06-18 DIAGNOSIS — I10 ESSENTIAL (PRIMARY) HYPERTENSION: ICD-10-CM

## 2024-06-18 DIAGNOSIS — M25.511 CHRONIC RIGHT SHOULDER PAIN: ICD-10-CM

## 2024-06-18 DIAGNOSIS — I48.0 PAROXYSMAL ATRIAL FIBRILLATION: ICD-10-CM

## 2024-06-18 DIAGNOSIS — Z79.01 CURRENT USE OF ANTICOAGULANT THERAPY: ICD-10-CM

## 2024-06-18 DIAGNOSIS — E03.9 ACQUIRED HYPOTHYROIDISM: ICD-10-CM

## 2024-06-18 DIAGNOSIS — E66.01 CLASS 3 SEVERE OBESITY DUE TO EXCESS CALORIES WITH SERIOUS COMORBIDITY AND BODY MASS INDEX (BMI) OF 40.0 TO 44.9 IN ADULT: ICD-10-CM

## 2024-06-18 DIAGNOSIS — G89.29 CHRONIC RIGHT SHOULDER PAIN: ICD-10-CM

## 2024-06-18 PROCEDURE — 71046 X-RAY EXAM CHEST 2 VIEWS: CPT | Mod: 26,,, | Performed by: RADIOLOGY

## 2024-06-18 PROCEDURE — 99999 PR PBB SHADOW E&M-EST. PATIENT-LVL IV: CPT | Mod: PBBFAC,,, | Performed by: NURSE PRACTITIONER

## 2024-06-18 PROCEDURE — 71046 X-RAY EXAM CHEST 2 VIEWS: CPT | Mod: TC

## 2024-06-18 PROCEDURE — 1160F RVW MEDS BY RX/DR IN RCRD: CPT | Mod: CPTII,S$GLB,, | Performed by: NURSE PRACTITIONER

## 2024-06-18 PROCEDURE — 3008F BODY MASS INDEX DOCD: CPT | Mod: CPTII,S$GLB,, | Performed by: NURSE PRACTITIONER

## 2024-06-18 PROCEDURE — 3078F DIAST BP <80 MM HG: CPT | Mod: CPTII,S$GLB,, | Performed by: NURSE PRACTITIONER

## 2024-06-18 PROCEDURE — 99214 OFFICE O/P EST MOD 30 MIN: CPT | Mod: S$GLB,,, | Performed by: NURSE PRACTITIONER

## 2024-06-18 PROCEDURE — 3074F SYST BP LT 130 MM HG: CPT | Mod: CPTII,S$GLB,, | Performed by: NURSE PRACTITIONER

## 2024-06-18 PROCEDURE — 3044F HG A1C LEVEL LT 7.0%: CPT | Mod: CPTII,S$GLB,, | Performed by: NURSE PRACTITIONER

## 2024-06-18 PROCEDURE — 1159F MED LIST DOCD IN RCRD: CPT | Mod: CPTII,S$GLB,, | Performed by: NURSE PRACTITIONER

## 2024-06-18 PROCEDURE — 1125F AMNT PAIN NOTED PAIN PRSNT: CPT | Mod: CPTII,S$GLB,, | Performed by: NURSE PRACTITIONER

## 2024-06-18 PROCEDURE — 3061F NEG MICROALBUMINURIA REV: CPT | Mod: CPTII,S$GLB,, | Performed by: NURSE PRACTITIONER

## 2024-06-18 PROCEDURE — 4010F ACE/ARB THERAPY RXD/TAKEN: CPT | Mod: CPTII,S$GLB,, | Performed by: NURSE PRACTITIONER

## 2024-06-18 PROCEDURE — 3066F NEPHROPATHY DOC TX: CPT | Mod: CPTII,S$GLB,, | Performed by: NURSE PRACTITIONER

## 2024-06-18 PROCEDURE — 1101F PT FALLS ASSESS-DOCD LE1/YR: CPT | Mod: CPTII,S$GLB,, | Performed by: NURSE PRACTITIONER

## 2024-06-18 PROCEDURE — 3288F FALL RISK ASSESSMENT DOCD: CPT | Mod: CPTII,S$GLB,, | Performed by: NURSE PRACTITIONER

## 2024-06-18 NOTE — TELEPHONE ENCOUNTER
----- Message from Swati Escobedo MA sent at 6/18/2024  2:56 PM CDT -----  Regarding: FW: Needs return call    ----- Message -----  From: Blanca Arreola  Sent: 6/18/2024   2:56 PM CDT  To: #  Subject: Needs return call                                Type: Needs Medical Advice  Who Called:  Pt    Best Call Back Number: [phone]    Additional Information: Pt needs to get nuclear stress test done before July 1st they were seeing if they can come here for that, please call to advise

## 2024-06-18 NOTE — LETTER
[facility] - [address]  [address]    [place] [address]  Phone: [phone]  Fax: [phone] June 19, 2024     Patient: Marcus Parisi   YOB: 1955   Date of Visit: 6/18/2024       To Whom It May Concern:    Patient is cleared from a primary care standpoint for right shoulder surgery. Cardiac clearance from cardiology is required for full clearance.      If you have any questions or concerns, please don't hesitate to contact my office.    Sincerely,        TERE De Leon

## 2024-06-18 NOTE — PROGRESS NOTES
Subjective:       Patient ID: Marcus Parisi is a 69 y.o. male.    Chief Complaint: pre-op clearance (Dr. Ro -- right shoulder)    Patient presents with:  pre-op clearance: Dr. Ro -- right shoulder       Subjective:    Marcus Parisi is a 69 y.o. male who presents to the office today for a preoperative consultation at the request of surgeon Dr. Ro who plans on performing right total shoulder arthroplasty on July 1, 2024. This consultation is requested for the specific conditions prompting preoperative evaluation (i.e. because of potential affect on operative risk) bleeding risk. Planned anesthesia: general. The patient has the following known anesthesia issues: none. Patients bleeding risk: no recent abnormal bleeding.     Procedure Risk: moderate  Recent Procedures: none  Recent infections: none    Heart Disease:  Angina history No  MI>6month No If yes then 5 points  MI<6 months No If yes then 10 points  Unstable Angina No If yes then 10 points  Class III No If yes then 10 points  Class IV No If yes then 20 points  Revasc <5years No and asymptomatic No  If symptomatic then needs restudy.  Pulmonary Edema history No If yes then 5 points  Pulmonary Edema <7days No If yes then 10 points  Critical Aortic Stenosis No If yes then 20 points  Abnormal EKG Yes  PACs No If yes 5 points        General poor health No If yes then 5 points  Age > 70 No If yes then 10 points  CVA No  HTN Yes  COPD No Consider PFTs prior to thoracotomy  Liver Failure/Alcohol withdrawal No  Anemia No    If any yes in categories greater than or equal to 10 points marked yes, procedure should be delayed, angiography and risk-factor modification should be considered. If the presence of other clinical predictors then stress test prior to procedure.   Points total 10   0-15 = low, 15-30 = intermediate, 31+ = high    Cardiographics  ECG: Pending, to be completed by cardiology  Echocardiogram: not done    Imaging  Chest x-ray: normal     Lab  Review   If large blood loss possible during surgery then CBC pre-op    CBC, CMP within normal range     Assessment:      69 y.o. male with planned surgery as above.    Known risk factors for perioperative complications: Atrial fibrillation, chronic anticoagulation therapy    Difficulty with intubation is not anticipated.    Cardiac Risk Estimation: intermediate     Plan:    1. Preoperative workup as follows chest x-ray, electrolytes, creatinine, glucose, liver function studies.   2. Medications reviewed. Change in medication regimen before surgery: discontinue ASA and fish oil 14 days before surgery, stop eliquis 2 days prior to surgery, restart eliquis 2 days post op.   3. Prophylaxis for cardiac events with perioperative beta-blockers: should be considered, specific regimen per anesthesia. Caution in patients with COPD.   4. Invasive hemodynamic monitoring perioperatively: should be considered.  5. Deep vein thrombosis prophylaxis postoperatively:regimen to be chosen by surgical team.  6. Other measures: Preoperative alcohol abstention x 30 days.  Preoperative tobacco abstention x 60 days.  7. Discuss with team  8. Follow up as scheduled      As with all procedures, there is inherent risk of multiple complications including bleeding,  infectious, cardiac, and pulmonary.  Please stop Aspirin and NSAIDS one week prior to surgery.    All smokers should quit smoking eight or more weeks prior to procedure to minimize complications associated with smoking. Reduction or cessation of smoking for less than four to eight weeks before surgery is of questionable benefit, and has actually been shown in some studies to result in higher complication rates.  Alcohol should be used in moderation.  Any pt with cardiopulmonary disease may warrant repeat examination prior to procedure along with directions for deep breathing exercises and incentive spirometry.    Patients at high risk for complications usually warrant cardiology  consultation and possibly angiography. Cardiac stress testing should be performed in patients at intermediate risk and with poor functional capacity or who are undergoing high-risk procedures, such as vascular surgery. For patients with minor clinical predictors, only patients who have poor functional capacity and are undergoing a high-risk procedure require stress testing. Patients with positive stress test results warrant cardiology consultation before proceeding with surgery.  Predisposing risk factors for pulmonary complications include cough, dyspnea, smoking, a history of lung disease, obesity and abdominal or thoracic surgery.    Any pts > 70 years old may be at risk for delirium or cardiac complications.  Furthermore, diabetic patients may be at risk for infection or prolonged healing.      Lois Laureano  06/18/2024 3:39 PM         Review of Systems   Constitutional:  Negative for activity change, appetite change and unexpected weight change.        Obesity   HENT:  Negative for ear discharge, ear pain, hearing loss, postnasal drip, rhinorrhea, sinus pain, trouble swallowing and voice change.         Ear fullness, decreased hearing   Eyes:  Negative for photophobia, pain, discharge and visual disturbance.   Respiratory:  Negative for chest tightness, shortness of breath and wheezing.    Cardiovascular:  Negative for chest pain, palpitations and leg swelling.        Hypertension, hyperlipidemia, afib   Gastrointestinal:  Negative for blood in stool, constipation, diarrhea and vomiting.   Endocrine: Negative for cold intolerance, heat intolerance, polydipsia and polyuria.        Hypothyroid   Genitourinary:  Negative for difficulty urinating, dysuria, flank pain, hematuria and urgency.        Prostate cancer history, ED   Musculoskeletal:  Positive for arthralgias. Negative for gait problem, joint swelling and neck pain.   Allergic/Immunologic: Negative for immunocompromised state.   Neurological:   Negative for dizziness, weakness and headaches.   Hematological:  Negative for adenopathy.   Psychiatric/Behavioral:  Negative for agitation, confusion, dysphoric mood, self-injury and suicidal ideas. The patient is not nervous/anxious.        Past Medical History:   Diagnosis Date    A-fib     Allergy     Anticoagulant long-term use     Arthritis     Asthma     as a child    Hyperlipidemia     Hypertension     Personal history of colonic polyps 03/22/2024    Prostate cancer 2019    Sleep apnea     Use CPAP    Sleep apnea     uses C-PAP      Past Surgical History:   Procedure Laterality Date    CARPAL TUNNEL RELEASE Bilateral     COLONOSCOPY  03/22/2024    3 YR RECALL    CYSTOSCOPY N/A 01/28/2019    Procedure: CYSTOSCOPY;  Surgeon: Delmy Hernandez MD;  Location: [facility] OR;  Service: Urology;  Laterality: N/A;    CYSTOSCOPY N/A 07/10/2023    Procedure: CYSTOSCOPY;  Surgeon: Delmy Hernandez MD;  Location: [facility] OR;  Service: Urology;  Laterality: N/A;    CYSTOURETEROSCOPY WITH RETROGRADE PYELOGRAPHY AND INSERTION OF STENT INTO URETER Left 11/10/2021    Procedure: CYSTOURETEROSCOPY, WITH RETROGRADE PYELOGRAM AND URETERAL STENT INSERTION;  Surgeon: Delmy Hernandez MD;  Location: [facility] OR;  Service: Urology;  Laterality: Left;  please make 1st patient    INSERTION OF IMPLANTABLE LOOP RECORDER N/A 04/23/2021    Procedure: Insertion, Implantable Loop Recorder;  Surgeon: Almaz Rivas MD;  Location: [facility];  Service: Cardiology;  Laterality: N/A;    ROBOT-ASSISTED LAPAROSCOPIC PROSTATECTOMY USING DA LAVERN XI N/A 07/30/2020    Procedure: XI ROBOTIC PROSTATECTOMY;  Surgeon: Mejia Gold MD;  Location: [facility] OR;  Service: Urology;  Laterality: N/A;    TONSILLECTOMY      TRANSRECTAL BIOPSY OF PROSTATE WITH ULTRASOUND GUIDANCE N/A 01/28/2019    Procedure: BIOPSY, PROSTATE, RECTAL APPROACH, WITH US GUIDANCE;  Surgeon: Delmy Hernandez MD;  Location: [facility] OR;  Service: Urology;   Laterality: N/A;       Family History   Problem Relation Name Age of Onset    Cancer Mother      Asthma Mother      Diabetes Mother      Heart disease Mother      Hypertension Mother      Hypertension Father      Crohn's disease Brother      Cancer Maternal Grandmother         Social History     Socioeconomic History    Marital status:    Tobacco Use    Smoking status: Former     Current packs/day: 0.00     Average packs/day: 3.0 packs/day for 5.0 years (15.0 ttl pk-yrs)     Types: Cigarettes     Start date:      Quit date:      Years since quittin.4    Smokeless tobacco: Former     Types: Chew    Tobacco comments:     quit 40 yrs ago   Substance and Sexual Activity    Alcohol use: Not Currently     Comment: occasional    Drug use: No    Sexual activity: Yes     Partners: Female     Social Determinants of Health     Financial Resource Strain: Low Risk  (6/3/2024)    Overall Financial Resource Strain (CARDIA)     Difficulty of Paying Living Expenses: Not hard at all   Food Insecurity: No Food Insecurity (6/3/2024)    Hunger Vital Sign     Worried About Running Out of Food in the Last Year: Never true     Ran Out of Food in the Last Year: Never true   Transportation Needs: No Transportation Needs (2020)    PRAPARE - Transportation     Lack of Transportation (Medical): No     Lack of Transportation (Non-Medical): No   Physical Activity: Inactive (6/3/2024)    Exercise Vital Sign     Days of Exercise per Week: 0 days     Minutes of Exercise per Session: 0 min   Stress: No Stress Concern Present (6/3/2024)    [employer] [place] [employer] - Occupational Stress Questionnaire     Feeling of Stress : Not at all   Housing Stability: Unknown (6/3/2024)    Housing Stability Vital Sign     Unable to Pay for Housing in the Last Year: No       Current Outpatient Medications   Medication Sig Dispense Refill    amlodipine-benazepril 5-20 mg (LOTREL) 5-20 mg per capsule Take 1 capsule by mouth  "once daily. 90 capsule 1    apixaban (ELIQUIS) 5 mg Tab Take 1 tablet (5 mg total) by mouth 2 (two) times daily. 180 tablet 1    atorvastatin (LIPITOR) 20 MG tablet Take 1 tablet (20 mg total) by mouth every evening. 90 tablet 1    cyanocobalamin (VITAMIN B-12) 1000 MCG tablet Take 1,000 mcg by mouth once daily.       levothyroxine (SYNTHROID) 50 MCG tablet TAKE 1 TABLET (50 MCG TOTAL) BY MOUTH BEFORE BREAKFAST. 90 tablet 3    metoprolol succinate (TOPROL-XL) 25 MG 24 hr tablet Take 1 tablet (25 mg total) by mouth every evening. 90 tablet 3    metoprolol succinate (TOPROL-XL) 50 MG 24 hr tablet Take 1 tablet (50 mg total) by mouth every morning. 90 tablet 3    mupirocin (BACTROBAN) 2 % ointment Apply topically 3 (three) times daily.      OMEGA-3 ACID ETHYL ESTERS ORAL Take 1 capsule by mouth once daily.       tadalafiL (CIALIS) 5 MG tablet Take 1 tablet (5 mg total) by mouth once daily. Take one every morning 90 tablet 3     No current facility-administered medications for this visit.       Review of patient's allergies indicates:  No Known Allergies  Objective:    HPI     pre-op clearance     Additional comments: Dr. Ro -- right shoulder          Last edited by Amarilys Jha MA on 6/18/2024  3:31 PM.      Blood pressure 120/70, pulse 87, temperature 99.1 °F (37.3 °C), height 5' 9" (1.753 m), weight (!) 137.9 kg (304 lb), SpO2 97%. Body mass index is 44.89 kg/m².   Physical Exam  Vitals and nursing note reviewed.   Constitutional:       General: He is not in acute distress.     Appearance: Normal appearance. He is well-developed. He is obese.   HENT:      Head: Normocephalic and atraumatic.      Right Ear: External ear normal.      Left Ear: External ear normal.      Nose: Nose normal.      Mouth/Throat:      Mouth: Mucous membranes are moist.      Pharynx: Uvula midline.   Eyes:      General: Lids are normal.      Extraocular Movements: Extraocular movements intact.      Conjunctiva/sclera: Conjunctivae " normal.      Pupils: Pupils are equal, round, and reactive to light.   Cardiovascular:      Rate and Rhythm: Normal rate. Rhythm irregular.      Pulses: Normal pulses.      Heart sounds: Normal heart sounds, S1 normal and S2 normal. No murmur heard.  Pulmonary:      Effort: Pulmonary effort is normal. No respiratory distress.      Breath sounds: Normal breath sounds.   Abdominal:      General: Bowel sounds are normal.      Palpations: Abdomen is soft.      Tenderness: There is no abdominal tenderness.   Musculoskeletal:      Right shoulder: Tenderness present. Decreased range of motion.      Cervical back: Normal range of motion and neck supple.   Lymphadenopathy:      Cervical: No cervical adenopathy.   Skin:     General: Skin is warm and dry.      Findings: No rash.   Neurological:      Mental Status: He is alert and oriented to person, place, and time. Mental status is at baseline.   Psychiatric:         Mood and Affect: Mood normal.         Speech: Speech normal.         Behavior: Behavior normal.         Thought Content: Thought content normal.         Judgment: Judgment normal.             Assessment:       1. Preop examination    2. Chronic right shoulder pain    3. Current use of anticoagulant therapy    4. Essential (primary) hypertension    5. Paroxysmal atrial fibrillation    6. Acquired hypothyroidism    7. Class 3 severe obesity due to excess calories with serious comorbidity and body mass index (BMI) of 40.0 to 44.9 in adult        Plan:       Marcus was seen today for pre-op clearance.    Diagnoses and all orders for this visit:    Preop examination  Patient is cleared from a primary care standpoint for right shoulder surgery. Cardiac clearance from cardiology is required for full clearance.     Chronic right shoulder pain  Surgery upcoming    Current use of anticoagulant therapy  Instructions given    Essential (primary) hypertension  -     COMPREHENSIVE METABOLIC PANEL; Future  -     CBC Auto  Differential; Future  -     X-Ray Chest PA And Lateral; Future    Paroxysmal atrial fibrillation  -     CBC Auto Differential; Future    Acquired hypothyroidism  Continue medication    Class 3 severe obesity due to excess calories with serious comorbidity and body mass index (BMI) of 40.0 to 44.9 in adult  The patient is asked to make an attempt to improve diet and exercise patterns to aid in medical management of this problem.    Patient is cleared from a primary care standpoint for right shoulder surgery. Cardiac clearance from cardiology is required for full clearance.

## 2024-06-20 ENCOUNTER — HOSPITAL ENCOUNTER (EMERGENCY)
Facility: HOSPITAL | Age: 69
Discharge: HOME | End: 2024-06-20
Attending: EMERGENCY MEDICINE
Payer: MEDICARE

## 2024-06-20 VITALS
OXYGEN SATURATION: 96 % | RESPIRATION RATE: 19 BRPM | SYSTOLIC BLOOD PRESSURE: 116 MMHG | WEIGHT: 300 LBS | HEIGHT: 69 IN | HEART RATE: 95 BPM | BODY MASS INDEX: 44.43 KG/M2 | DIASTOLIC BLOOD PRESSURE: 80 MMHG | TEMPERATURE: 99 F

## 2024-06-20 DIAGNOSIS — S61.011A LACERATION OF RIGHT THUMB WITHOUT FOREIGN BODY WITHOUT DAMAGE TO NAIL, INITIAL ENCOUNTER: Primary | ICD-10-CM

## 2024-06-20 PROCEDURE — 25000003 PHARM REV CODE 250: Performed by: NURSE PRACTITIONER

## 2024-06-20 PROCEDURE — 90471 IMMUNIZATION ADMIN: CPT | Performed by: NURSE PRACTITIONER

## 2024-06-20 PROCEDURE — 99284 EMERGENCY DEPT VISIT MOD MDM: CPT | Mod: 25

## 2024-06-20 PROCEDURE — 63600175 PHARM REV CODE 636 W HCPCS: Performed by: NURSE PRACTITIONER

## 2024-06-20 PROCEDURE — 12042 INTMD RPR N-HF/GENIT2.6-7.5: CPT

## 2024-06-20 PROCEDURE — 90715 TDAP VACCINE 7 YRS/> IM: CPT | Performed by: NURSE PRACTITIONER

## 2024-06-20 RX ORDER — AMOXICILLIN AND CLAVULANATE POTASSIUM 875; 125 MG/1; MG/1
1 TABLET, FILM COATED ORAL 2 TIMES DAILY
Qty: 14 TABLET | Refills: 0 | Status: SHIPPED | OUTPATIENT
Start: 2024-06-20

## 2024-06-20 RX ORDER — AMOXICILLIN AND CLAVULANATE POTASSIUM 875; 125 MG/1; MG/1
1 TABLET, FILM COATED ORAL 2 TIMES DAILY
Qty: 14 TABLET | Refills: 0 | Status: SHIPPED | OUTPATIENT
Start: 2024-06-20 | End: 2024-06-20

## 2024-06-20 RX ORDER — LIDOCAINE HYDROCHLORIDE 10 MG/ML
10 INJECTION, SOLUTION EPIDURAL; INFILTRATION; INTRACAUDAL; PERINEURAL
Status: COMPLETED | OUTPATIENT
Start: 2024-06-20 | End: 2024-06-20

## 2024-06-20 RX ADMIN — LIDOCAINE HYDROCHLORIDE 100 MG: 10 INJECTION, SOLUTION EPIDURAL; INFILTRATION; INTRACAUDAL; PERINEURAL at 04:06

## 2024-06-20 RX ADMIN — CLOSTRIDIUM TETANI TOXOID ANTIGEN (FORMALDEHYDE INACTIVATED), CORYNEBACTERIUM DIPHTHERIAE TOXOID ANTIGEN (FORMALDEHYDE INACTIVATED), BORDETELLA PERTUSSIS TOXOID ANTIGEN (GLUTARALDEHYDE INACTIVATED), BORDETELLA PERTUSSIS FILAMENTOUS HEMAGGLUTININ ANTIGEN (FORMALDEHYDE INACTIVATED), BORDETELLA PERTUSSIS PERTACTIN ANTIGEN, AND BORDETELLA PERTUSSIS FIMBRIAE 2/3 ANTIGEN 0.5 ML: 5; 2; 2.5; 5; 3; 5 INJECTION, SUSPENSION INTRAMUSCULAR at 04:06

## 2024-06-20 NOTE — FIRST PROVIDER EVALUATION
Emergency Department TeleTriage Encounter Note      CHIEF COMPLAINT    Chief Complaint   Patient presents with    Laceration     R 5th digit x30 mins. Pt hand slipped into chop saw       VITAL SIGNS   Initial Vitals [06/20/24 1309]   BP Pulse Resp Temp SpO2   116/80 95 19 98.5 °F (36.9 °C) 96 %      MAP       --            ALLERGIES    Review of patient's allergies indicates:  No Known Allergies    PROVIDER TRIAGE NOTE  Verbal consent for the teletriage evaluation was given by the patient at the start of the evaluation.  All efforts will be made to maintain patient's privacy during the evaluation.      This is a teletriage evaluation of a 69 y.o. male presenting to the ED with c/o laceration to right thumb, cut on a chop saw.  Last tetanus 2005. Limited physical exam via telehealth: The patient is awake, alert, answering questions appropriately and is not in respiratory distress.  As the Teletriage provider, I performed an initial assessment and ordered appropriate labs and imaging studies, if any, to facilitate the patient's care once placed in the ED. Once a room is available, care and a full evaluation will be completed by an alternate ED provider.  Any additional orders and the final disposition will be determined by that provider.  All imaging and labs will not be followed-up by the Teletriage Team, including myself.          ORDERS  Labs Reviewed - No data to display    ED Orders (720h ago, onward)      Start Ordered     Status Ordering Provider    06/21/24 0400 06/20/24 1404  Wound care routine - Clean wound  Daily        Comments: Clean Wound    Ordered ROEL BETH    06/21/24 0400 06/20/24 1404  Wound care routine - Irrigate wound  Daily        Comments: Irrigate Wound    Ordered ROEL BETH    06/21/24 0400 06/20/24 1404  Wound care routine - Sterile Gloves to Bedside  Daily        Comments: Provide sterile gloves to bedside    Ordered ROEL BETH    06/20/24 1415 06/20/24 1404  Tdap vaccine  injection 0.5 mL  ED 1 Time         Ordered ROEL BETH    06/20/24 1415 06/20/24 1404  LIDOcaine (PF) 10 mg/ml (1%) injection 100 mg  ED 1 Time         Ordered ROEL BETH    06/20/24 1415 06/20/24 1404  neomycin-bacitracnZn-polymyxnB packet  ED 1 Time         Ordered ROEL BETH    06/20/24 1404 06/20/24 1404  X-Ray Finger 2 or More Views Right  1 time imaging         Ordered ROEL BETH    06/20/24 1404 06/20/24 1404  Provide suture tray to patient bedside  Once         Ordered ROEL BETH    06/20/24 1404 06/20/24 1404  Change dressing - apply dry sterile dressing  Once        Comments: Apply dry sterile dressing.    Ordered ROEL BETH A              Virtual Visit Note: The provider triage portion of this emergency department evaluation and documentation was performed via [employer], a HIPAA-compliant telemedicine application, in concert with a tele-presenter in the room. A face to face patient evaluation with one of my colleagues will occur once the patient is placed in an emergency department room.      DISCLAIMER: This note was prepared with [employer] voice recognition transcription software. Garbled syntax, mangled pronouns, and other bizarre constructions may be attributed to that software system.

## 2024-06-20 NOTE — ED PROVIDER NOTES
Source of History:  Patient, chart    Chief complaint:  Laceration (R 5th digit x30 mins. Pt hand slipped into chop saw)      HPI:  Marcus Parisi is a 69 y.o. male with medical history of hypertension, arthritis, AFib presenting with right thumb laceration.  Patient states that he cut his thumb on a chop saw.  Patient unsure of his last tetanus.  Patient denies any numbness or tingling of thumb or hand.  No decreased range of motion.    This is the extent to the patients complaints today here in the emergency department.    ROS: As per HPI and below:  Constitutional: No fever.  No chills.  Eyes: No visual changes.   ENT: No sore throat. No ear pain.  Urinary: No abnormal urination.  MSK: No back pain. No joint pain.   Integument:  Positive for laceration.    Review of patient's allergies indicates:  No Known Allergies    PMH:  As per HPI and below:  Past Medical History:   Diagnosis Date    A-fib     Allergy     Anticoagulant long-term use     Arthritis     Asthma     as a child    Hyperlipidemia     Hypertension     Personal history of colonic polyps 03/22/2024    Prostate cancer 2019    Sleep apnea     Use CPAP    Sleep apnea     uses C-PAP     Past Surgical History:   Procedure Laterality Date    CARPAL TUNNEL RELEASE Bilateral     COLONOSCOPY  03/22/2024    3 YR RECALL    CYSTOSCOPY N/A 01/28/2019    Procedure: CYSTOSCOPY;  Surgeon: Delmy Hernandez MD;  Location: [facility] OR;  Service: Urology;  Laterality: N/A;    CYSTOSCOPY N/A 07/10/2023    Procedure: CYSTOSCOPY;  Surgeon: Delmy Hernandez MD;  Location: [facility] OR;  Service: Urology;  Laterality: N/A;    CYSTOURETEROSCOPY WITH RETROGRADE PYELOGRAPHY AND INSERTION OF STENT INTO URETER Left 11/10/2021    Procedure: CYSTOURETEROSCOPY, WITH RETROGRADE PYELOGRAM AND URETERAL STENT INSERTION;  Surgeon: Delmy Hernandez MD;  Location: [facility] OR;  Service: Urology;  Laterality: Left;  please make 1st patient    INSERTION OF IMPLANTABLE LOOP  "RECORDER N/A 2021    Procedure: Insertion, Implantable Loop Recorder;  Surgeon: Almaz Rivas MD;  Location: [facility] CATH;  Service: Cardiology;  Laterality: N/A;    ROBOT-ASSISTED LAPAROSCOPIC PROSTATECTOMY USING DA LAVERN XI N/A 2020    Procedure: XI ROBOTIC PROSTATECTOMY;  Surgeon: Mejia Gold MD;  Location: [facility] OR;  Service: Urology;  Laterality: N/A;    TONSILLECTOMY      TRANSRECTAL BIOPSY OF PROSTATE WITH ULTRASOUND GUIDANCE N/A 2019    Procedure: BIOPSY, PROSTATE, RECTAL APPROACH, WITH US GUIDANCE;  Surgeon: Delmy Hernandez MD;  Location: [facility] OR;  Service: Urology;  Laterality: N/A;       Social History     Tobacco Use    Smoking status: Former     Current packs/day: 0.00     Average packs/day: 3.0 packs/day for 5.0 years (15.0 ttl pk-yrs)     Types: Cigarettes     Start date:      Quit date:      Years since quittin.5    Smokeless tobacco: Former     Types: Chew    Tobacco comments:     quit 40 yrs ago   Substance Use Topics    Alcohol use: Not Currently     Comment: occasional    Drug use: No       Physical Exam:    /80   Pulse 95   Temp 98.5 °F (36.9 °C) (Oral)   Resp 19   Ht 5' 9" (1.753 m)   Wt 136.1 kg (300 lb)   SpO2 96%   BMI 44.30 kg/m²   Nursing note and vital signs reviewed.  Constitutional: No acute distress.  Nontoxic  Eyes: No conjunctival injection.  Extraocular muscles are intact.  ENT: Normal phonation.  Musculoskeletal: Good range of motion all joints.  No deformities.  Neck supple.  No meningismus. Neurovascularly intact.  Skin:  4 cm in length laceration noted to right thumb.  Extension flexion of right thumb within normal limits.  No sign of tendon involvement noted.  Plus two radial pulse noted.  Psych: Appropriate, conversant.    MDM    Emergent evaluation of a 68 yo male presenting for right thumb laceration.  Patient states he was a trip and fall onto a chop saw at home.  Patient denies hitting head or loss of consciousness. "  Patient is unsure of his last tetanus.  On exam pt is A&Ox3. VSS. Nonfebrile and nontoxic appearing.  Mucous membranes pink and moist. 4 cm in length laceration noted to right thumb.  Extension flexion of right thumb within normal limits.  No sign of tendon involvement noted.  Plus two radial pulse noted. Pt speaking in full sentences.  Steady gait appreciated. Cap refill < 3 seconds.      History Acquisition   Additional history was acquired from other historians.  Chart    The patient's list of active medical problems, social history, medications, and allergies as documented per RN staff has been reviewed.     Differential Diagnoses   Based on available information and the initial assessment, the working differential diagnoses considered during this evaluation include but are not limited to laceration, contusion, ligament injury, tendon injury, others.    I will get imaging, update tetanus and reassess.  I discussed this case with my supervising physician.    Imaging     Imaging Results              X-Ray Finger 2 or More Views Right (Final result)  Result time 06/20/24 14:43:19      Final result by Adis Griffin,  (06/20/24 14:43:19)                   Impression:      1. Sub millimetric radiopacity in the dorsal soft tissues of the thumb at the level the 1st MTP joint is felt to reflect a foreign body.  2. No acute osseous abnormality.      Electronically signed by: Adis Griffin  Date:    06/20/2024  Time:    14:43               Narrative:    EXAMINATION:  XR FINGER 2 OR MORE VIEWS RIGHT    CLINICAL HISTORY:  laceration;    FINDINGS:  Four views of the right thumb show no acute fracture dislocation.  There is severe degenerative changes of the 1st CMC joint.  There is a sub millimetric punctate radiopacity in the dorsal soft tissues of the level of the 1st MTP joint felt to reflect a foreign body.                                      A radiology report was available for my review at the time of the  encounter.    Procedures   Lac Repair    Date/Time: 6/20/2024 5:58 PM    Performed by: Sejal Jorgensen PA-C  Authorized by: Devyn Finnegan MD    Consent:     Consent given by:  Patient    Risks, benefits, and alternatives were discussed: yes      Risks discussed:  Infection, pain and retained foreign body  Universal protocol:     Procedure explained and questions answered to patient or proxy's satisfaction: yes      Imaging studies available: yes    Anesthesia:     Anesthesia method:  Nerve block    Block needle gauge:  25 G    Block anesthetic:  Lidocaine 1% w/o epi    Block injection procedure:  Anatomic landmarks identified    Block outcome:  Anesthesia achieved  Laceration details:     Location:  Hand    Hand location:  R hand, dorsum    Length (cm):  5    Depth (mm):  0.4  Pre-procedure details:     Preparation:  Imaging obtained to evaluate for foreign bodies  Exploration:     Imaging obtained: x-ray      Imaging outcome: foreign body not noted      Wound exploration: wound explored through full range of motion and entire depth of wound visualized      Contaminated: yes    Treatment:     Area cleansed with:  Povidone-iodine and saline    Amount of cleaning:  Extensive    Irrigation solution:  Sterile saline    Irrigation volume:  600    Irrigation method:  Pressure wash  Skin repair:     Repair method:  Sutures    Suture size:  5-0    Suture material:  Nylon    Suture technique:  Simple interrupted    Number of sutures:  4  Approximation:     Approximation:  Close  Repair type:     Repair type:  Simple  Post-procedure details:     Dressing:  Adhesive bandage  Comments:      Ends of the laceration are approximately 0.4 cm deep with the middle of the wound being more superficial.  Two sutures placed at each end of the laceration with the middle left open as it was too superficial for sutures.  Wound was extensively cleaned with 600 cc of sterile saline as well as iodine.  Nurse placed adhesive bandage.        Additional Consideration   All available testing was considered during the course of this workup.  Comorbidities taken into consideration during the patient's evaluation and treatment include weight, age.    Social determinants of health were taken into consideration during development of our treatment plan.    Medications   neomycin-bacitracnZn-polymyxnB packet (has no administration in time range)   LETS (LIDOcaine-TETRAcaine-EPINEPHrine) gel solution (has no administration in time range)   Tdap vaccine injection 0.5 mL (0.5 mLs Intramuscular Given 6/20/24 1631)   LIDOcaine (PF) 10 mg/ml (1%) injection 100 mg (100 mg Infiltration Given 6/20/24 1637)                Attending Attestation:             Attending ED Notes:   I discussed the case with the MELLISSA, agree with the plan. I did not see the patient.      Laceration repaired as per procedure note.  Patient advised to keep area clean and dry.  Apply Neosporin twice a day as needed for the next 3 days.  Rotate Tylenol and ibuprofen as needed.  Follow up with PCP, urgent care or ED in 7-14 days for suture removal.  Strict return to ED precautions discussed.  Patient verbalized understanding of this plan of care.  All questions and concerns addressed.      Patient is hemodynamically stable, vital signs are normal. Discharge instructions given. Return to ED precautions discussed. Follow up as directed. Pt verbalized understanding of this plan.  Pt is stable for discharge.     CLINICAL IMPRESSION  1. Laceration of right thumb without foreign body without damage to nail, initial encounter         ED Disposition Condition    Discharge Stable            I see no indication of an emergent process beyond that addressed during our encounter but have duly counseled the patient/family regarding the need for prompt follow-up as well as the indications that should prompt immediate return to the emergency room should new or worrisome developments occur.  The patient/family  has been provided with verbal and printed direction regarding our final diagnosis(es) as well as instructions regarding use of OTC and/or Rx medications intended to manage the patient's aforementioned conditions including:  ED Prescriptions       Medication Sig Dispense Start Date End Date Auth. Provider    amoxicillin-clavulanate 875-125mg (AUGMENTIN) 875-125 mg per tablet  (Status: Discontinued) Take 1 tablet by mouth 2 (two) times daily. 14 tablet 6/20/2024 6/20/2024 Elizabeth Villalobos NP    amoxicillin-clavulanate 875-125mg (AUGMENTIN) 875-125 mg per tablet Take 1 tablet by mouth 2 (two) times daily. 14 tablet 6/20/2024 -- Elizabeth Villalobos NP          Patient has been advised of following recommended follow-up instructions:  Follow-up Information       Follow up With Specialties Details Why Contact Info    Lois Arevalo FNP-C Family Medicine Schedule an appointment as soon as possible for a visit  As needed [address] [facility] [address]  [phone]            The patient/family communicates understanding of all this information and all remaining questions and concerns were addressed at this time.      The patient's condition did not warrant review of the  and prescription of controlled substances.      This note was created using dictation software.  This program may occasionally mistype words and phrases.         Elizabeth Villalobos NP  06/20/24 8149       Devyn Finnegan MD  06/21/24 0006       Sejal Jorgensen PA-C  06/29/24 [id number]

## 2024-06-21 ENCOUNTER — PATIENT OUTREACH (OUTPATIENT)
Dept: EMERGENCY MEDICINE | Facility: HOSPITAL | Age: 69
End: 2024-06-21

## 2024-06-24 ENCOUNTER — TELEPHONE (OUTPATIENT)
Dept: CARDIOLOGY | Facility: HOSPITAL | Age: 69
End: 2024-06-24

## 2024-06-24 NOTE — TELEPHONE ENCOUNTER
Left message on voicemail.     Patient advised, test will be at [facility] ([address]   Will need to register on the first floor at the main entrance.   Patient advised that arrival time is 9:00am.  Patient advised that he may be here about 2.5-3 hours, and may want to bring something to occupy their time, as there will be periods of waiting.    Patient advised, may take his medications prior to testing if you need to.   Advised if he needs to eat to take his medications, please keep it light, like toast and juice.    Patient advised to avoid all caffeine 12 hours prior to testing.  This includes decaf tea and coffee.    Will provide peanut butter crackers for a snack after stress test.  If patient would prefer something else, please bring a snack from home.    Wear comfortable clothing.   No lotions, oils, or powders to the upper chest area. May wear deodorant.    No metal jewelry, buttons, or zippers to the upper body.  Advised to call the office if any questions.     Will send instructions via [place] as well.

## 2024-06-25 ENCOUNTER — HOSPITAL ENCOUNTER (OUTPATIENT)
Dept: CARDIOLOGY | Facility: HOSPITAL | Age: 69
Discharge: HOME | End: 2024-06-25
Attending: INTERNAL MEDICINE
Payer: MEDICARE

## 2024-06-25 ENCOUNTER — HOSPITAL ENCOUNTER (OUTPATIENT)
Dept: RADIOLOGY | Facility: HOSPITAL | Age: 69
Discharge: HOME | End: 2024-06-25
Attending: INTERNAL MEDICINE
Payer: MEDICARE

## 2024-06-25 DIAGNOSIS — I48.0 PAROXYSMAL ATRIAL FIBRILLATION: ICD-10-CM

## 2024-06-25 DIAGNOSIS — Z01.810 ENCOUNTER FOR PRE-OPERATIVE CARDIOVASCULAR CLEARANCE: ICD-10-CM

## 2024-06-25 PROCEDURE — 93016 CV STRESS TEST SUPVJ ONLY: CPT | Mod: ,,,

## 2024-06-25 PROCEDURE — 93017 CV STRESS TEST TRACING ONLY: CPT

## 2024-06-25 PROCEDURE — 63600175 PHARM REV CODE 636 W HCPCS: Performed by: INTERNAL MEDICINE

## 2024-06-25 PROCEDURE — 93018 CV STRESS TEST I&R ONLY: CPT | Mod: ,,, | Performed by: INTERNAL MEDICINE

## 2024-06-25 PROCEDURE — 78452 HT MUSCLE IMAGE SPECT MULT: CPT | Mod: 26,,, | Performed by: INTERNAL MEDICINE

## 2024-06-25 PROCEDURE — A9502 TC99M TETROFOSMIN: HCPCS | Performed by: INTERNAL MEDICINE

## 2024-06-25 RX ORDER — REGADENOSON 0.08 MG/ML
0.4 INJECTION, SOLUTION INTRAVENOUS
Status: COMPLETED | OUTPATIENT
Start: 2024-06-25 | End: 2024-06-25

## 2024-06-25 RX ADMIN — TETROFOSMIN 26.9 MILLICURIE: 1.38 INJECTION, POWDER, LYOPHILIZED, FOR SOLUTION INTRAVENOUS at 09:06

## 2024-06-25 RX ADMIN — REGADENOSON 0.4 MG: 0.08 INJECTION, SOLUTION INTRAVENOUS at 09:06

## 2024-06-26 ENCOUNTER — HOSPITAL ENCOUNTER (OUTPATIENT)
Dept: RADIOLOGY | Facility: HOSPITAL | Age: 69
Discharge: HOME | End: 2024-06-26
Attending: INTERNAL MEDICINE
Payer: MEDICARE

## 2024-06-26 LAB
CV PHARM DOSE: 0.4 MG
CV STRESS BASE HR: 93 BPM
DIASTOLIC BLOOD PRESSURE: 71 MMHG
EJECTION FRACTION- HIGH: 65 %
END DIASTOLIC INDEX-HIGH: 153 ML/M2
END DIASTOLIC INDEX-LOW: 93 ML/M2
END SYSTOLIC INDEX-HIGH: 71 ML/M2
END SYSTOLIC INDEX-LOW: 31 ML/M2
NUC REST DIASTOLIC VOLUME INDEX: 113
NUC REST EJECTION FRACTION: 74
NUC REST SYSTOLIC VOLUME INDEX: 29
NUC STRESS DIASTOLIC VOLUME INDEX: 131
NUC STRESS EJECTION FRACTION: 70 %
NUC STRESS SYSTOLIC VOLUME INDEX: 39
OHS CV CPX 1 MINUTE RECOVERY HEART RATE: 96 BPM
OHS CV CPX 85 PERCENT MAX PREDICTED HEART RATE MALE: 128
OHS CV CPX MAX PREDICTED HEART RATE: 151
OHS CV CPX PATIENT IS FEMALE: 0
OHS CV CPX PATIENT IS MALE: 1
OHS CV CPX PEAK DIASTOLIC BLOOD PRESSURE: 67 MMHG
OHS CV CPX PEAK HEAR RATE: 96 BPM
OHS CV CPX PEAK RATE PRESSURE PRODUCT: NORMAL
OHS CV CPX PEAK SYSTOLIC BLOOD PRESSURE: 131 MMHG
OHS CV CPX PERCENT MAX PREDICTED HEART RATE ACHIEVED: 64
OHS CV CPX RATE PRESSURE PRODUCT PRESENTING: NORMAL
RETIRED EF AND QEF - SEE NOTES: 53 %
SYSTOLIC BLOOD PRESSURE: 125 MMHG

## 2024-06-26 PROCEDURE — A9502 TC99M TETROFOSMIN: HCPCS | Performed by: INTERNAL MEDICINE

## 2024-06-26 RX ADMIN — TETROFOSMIN 24.5 MILLICURIE: 1.38 INJECTION, POWDER, LYOPHILIZED, FOR SOLUTION INTRAVENOUS at 07:06

## 2024-06-27 ENCOUNTER — OFFICE VISIT (OUTPATIENT)
Dept: FAMILY MEDICINE | Facility: CLINIC | Age: 69
End: 2024-06-27
Payer: MEDICARE

## 2024-06-27 VITALS
HEART RATE: 85 BPM | WEIGHT: 300 LBS | HEIGHT: 69 IN | OXYGEN SATURATION: 96 % | DIASTOLIC BLOOD PRESSURE: 74 MMHG | BODY MASS INDEX: 44.43 KG/M2 | SYSTOLIC BLOOD PRESSURE: 122 MMHG

## 2024-06-27 DIAGNOSIS — Z48.02 ENCOUNTER FOR REMOVAL OF SUTURES: ICD-10-CM

## 2024-06-27 DIAGNOSIS — S61.011A LACERATION OF RIGHT THUMB WITHOUT FOREIGN BODY WITHOUT DAMAGE TO NAIL, INITIAL ENCOUNTER: Primary | ICD-10-CM

## 2024-06-27 PROCEDURE — 1126F AMNT PAIN NOTED NONE PRSNT: CPT | Mod: HCNC,CPTII,S$GLB, | Performed by: NURSE PRACTITIONER

## 2024-06-27 PROCEDURE — 1159F MED LIST DOCD IN RCRD: CPT | Mod: HCNC,CPTII,S$GLB, | Performed by: NURSE PRACTITIONER

## 2024-06-27 PROCEDURE — 3074F SYST BP LT 130 MM HG: CPT | Mod: HCNC,CPTII,S$GLB, | Performed by: NURSE PRACTITIONER

## 2024-06-27 PROCEDURE — 3044F HG A1C LEVEL LT 7.0%: CPT | Mod: HCNC,CPTII,S$GLB, | Performed by: NURSE PRACTITIONER

## 2024-06-27 PROCEDURE — 3061F NEG MICROALBUMINURIA REV: CPT | Mod: HCNC,CPTII,S$GLB, | Performed by: NURSE PRACTITIONER

## 2024-06-27 PROCEDURE — 3078F DIAST BP <80 MM HG: CPT | Mod: HCNC,CPTII,S$GLB, | Performed by: NURSE PRACTITIONER

## 2024-06-27 PROCEDURE — 3066F NEPHROPATHY DOC TX: CPT | Mod: HCNC,CPTII,S$GLB, | Performed by: NURSE PRACTITIONER

## 2024-06-27 PROCEDURE — 99213 OFFICE O/P EST LOW 20 MIN: CPT | Mod: HCNC,S$GLB,, | Performed by: NURSE PRACTITIONER

## 2024-06-27 PROCEDURE — 99999 PR PBB SHADOW E&M-EST. PATIENT-LVL IV: CPT | Mod: PBBFAC,HCNC,, | Performed by: NURSE PRACTITIONER

## 2024-06-27 PROCEDURE — 1160F RVW MEDS BY RX/DR IN RCRD: CPT | Mod: HCNC,CPTII,S$GLB, | Performed by: NURSE PRACTITIONER

## 2024-06-27 PROCEDURE — 4010F ACE/ARB THERAPY RXD/TAKEN: CPT | Mod: HCNC,CPTII,S$GLB, | Performed by: NURSE PRACTITIONER

## 2024-06-27 PROCEDURE — 3008F BODY MASS INDEX DOCD: CPT | Mod: HCNC,CPTII,S$GLB, | Performed by: NURSE PRACTITIONER

## 2024-06-27 NOTE — PROGRESS NOTES
SUBJECTIVE:      Patient ID: Marcus Parisi is a 69 y.o. male.    Chief Complaint: Suture / Staple Removal    69-year-old male presents to the clinic for suture removal. He is an established patient of [facility].     Patient cut his right thumb on a chop saw on . He was seen in the ED. He had a 4-5 cm laceration, closed with 5-0 Nylon sutures x4. Tetanus vaccine update. X-ray showed no fracture.  He was advised to apply Neosporin twice a day as needed for the next 3 days.  Rotate Tylenol and ibuprofen as needed. Started on Augmentin.     The laceration has been heeling well. Reports compliance with Augmentin. He his not having any pain. Occasional scant non purulent drainage. Continues to have good ROM of thumb.          Family History   Problem Relation Name Age of Onset    Cancer Mother      Asthma Mother      Diabetes Mother      Heart disease Mother      Hypertension Mother      Hypertension Father      Crohn's disease Brother      Cancer Maternal Grandmother        Social History     Socioeconomic History    Marital status:    Tobacco Use    Smoking status: Former     Current packs/day: 0.00     Average packs/day: 3.0 packs/day for 5.0 years (15.0 ttl pk-yrs)     Types: Cigarettes     Start date:      Quit date:      Years since quittin.5    Smokeless tobacco: Former     Types: Chew    Tobacco comments:     quit 40 yrs ago   Substance and Sexual Activity    Alcohol use: Not Currently     Comment: occasional    Drug use: No    Sexual activity: Yes     Partners: Female     Social Determinants of Health     Financial Resource Strain: Low Risk  (6/3/2024)    Overall Financial Resource Strain (CARDIA)     Difficulty of Paying Living Expenses: Not hard at all   Food Insecurity: No Food Insecurity (6/3/2024)    Hunger Vital Sign     Worried About Running Out of Food in the Last Year: Never true     Ran Out of Food in the Last Year: Never true   Transportation Needs: No  Transportation Needs (7/27/2020)    PRAPARE - Transportation     Lack of Transportation (Medical): No     Lack of Transportation (Non-Medical): No   Physical Activity: Inactive (6/3/2024)    Exercise Vital Sign     Days of Exercise per Week: 0 days     Minutes of Exercise per Session: 0 min   Stress: No Stress Concern Present (6/3/2024)    [place] [employer] - Occupational Stress Questionnaire     Feeling of Stress : Not at all   Housing Stability: Unknown (6/3/2024)    Housing Stability Vital Sign     Unable to Pay for Housing in the Last Year: No     Current Outpatient Medications   Medication Sig Dispense Refill    amlodipine-benazepril 5-20 mg (LOTREL) 5-20 mg per capsule Take 1 capsule by mouth once daily. 90 capsule 1    amoxicillin-clavulanate 875-125mg (AUGMENTIN) 875-125 mg per tablet Take 1 tablet by mouth 2 (two) times daily. 14 tablet 0    apixaban (ELIQUIS) 5 mg Tab Take 1 tablet (5 mg total) by mouth 2 (two) times daily. 180 tablet 1    atorvastatin (LIPITOR) 20 MG tablet Take 1 tablet (20 mg total) by mouth every evening. 90 tablet 1    cyanocobalamin (VITAMIN B-12) 1000 MCG tablet Take 1,000 mcg by mouth once daily.       levothyroxine (SYNTHROID) 50 MCG tablet TAKE 1 TABLET (50 MCG TOTAL) BY MOUTH BEFORE BREAKFAST. 90 tablet 3    metoprolol succinate (TOPROL-XL) 25 MG 24 hr tablet Take 1 tablet (25 mg total) by mouth every evening. 90 tablet 3    metoprolol succinate (TOPROL-XL) 50 MG 24 hr tablet Take 1 tablet (50 mg total) by mouth every morning. 90 tablet 3    mupirocin (BACTROBAN) 2 % ointment Apply topically 3 (three) times daily.      OMEGA-3 ACID ETHYL ESTERS ORAL Take 1 capsule by mouth once daily.       tadalafiL (CIALIS) 5 MG tablet Take 1 tablet (5 mg total) by mouth once daily. Take one every morning 90 tablet 3     No current facility-administered medications for this visit.     Review of patient's allergies indicates:  No Known Allergies   Past Medical History:    Diagnosis Date    A-fib     Allergy     Anticoagulant long-term use     Arthritis     Asthma     as a child    Hyperlipidemia     Hypertension     Personal history of colonic polyps 03/22/2024    Prostate cancer 2019    Sleep apnea     Use CPAP    Sleep apnea     uses C-PAP     Past Surgical History:   Procedure Laterality Date    CARPAL TUNNEL RELEASE Bilateral     COLONOSCOPY  03/22/2024    3 YR RECALL    CYSTOSCOPY N/A 01/28/2019    Procedure: CYSTOSCOPY;  Surgeon: Delmy Hernandez MD;  Location: [facility] OR;  Service: Urology;  Laterality: N/A;    CYSTOSCOPY N/A 07/10/2023    Procedure: CYSTOSCOPY;  Surgeon: Delmy Hernandez MD;  Location: [facility] OR;  Service: Urology;  Laterality: N/A;    CYSTOURETEROSCOPY WITH RETROGRADE PYELOGRAPHY AND INSERTION OF STENT INTO URETER Left 11/10/2021    Procedure: CYSTOURETEROSCOPY, WITH RETROGRADE PYELOGRAM AND URETERAL STENT INSERTION;  Surgeon: Delmy Hernandez MD;  Location: [facility] OR;  Service: Urology;  Laterality: Left;  please make 1st patient    INSERTION OF IMPLANTABLE LOOP RECORDER N/A 04/23/2021    Procedure: Insertion, Implantable Loop Recorder;  Surgeon: Almaz Rivas MD;  Location: [facility] CATH;  Service: Cardiology;  Laterality: N/A;    ROBOT-ASSISTED LAPAROSCOPIC PROSTATECTOMY USING DA LAVERN XI N/A 07/30/2020    Procedure: XI ROBOTIC PROSTATECTOMY;  Surgeon: Mejia Gold MD;  Location: [facility] OR;  Service: Urology;  Laterality: N/A;    TONSILLECTOMY      TRANSRECTAL BIOPSY OF PROSTATE WITH ULTRASOUND GUIDANCE N/A 01/28/2019    Procedure: BIOPSY, PROSTATE, RECTAL APPROACH, WITH US GUIDANCE;  Surgeon: Delmy Hernandez MD;  Location: [facility] OR;  Service: Urology;  Laterality: N/A;       Review of Systems   Constitutional:  Negative for activity change, appetite change, chills, diaphoresis, fatigue, fever and unexpected weight change.   HENT:  Negative for hearing loss, rhinorrhea and trouble swallowing.    Eyes:  Negative for  "discharge and visual disturbance.   Respiratory:  Negative for chest tightness and wheezing.    Cardiovascular:  Negative for chest pain and palpitations.   Gastrointestinal:  Negative for blood in stool, constipation, diarrhea and vomiting.   Endocrine: Negative for polydipsia and polyuria.   Genitourinary:  Negative for difficulty urinating, hematuria and urgency.   Musculoskeletal:  Negative for arthralgias, joint swelling and neck pain.   Skin:  Positive for wound (laceration right thumb).   Neurological:  Negative for weakness and headaches.   Psychiatric/Behavioral:  Negative for confusion and dysphoric mood.       OBJECTIVE:      Vitals:    06/27/24 0956   BP: 122/74   BP Location: Left arm   Patient Position: Sitting   BP Method: Large (Manual)   Pulse: 85   SpO2: 96%   Weight: 136.1 kg (300 lb)   Height: 5' 9" (1.753 m)     Physical Exam  Constitutional:       General: He is awake.      Appearance: He is well-developed and well-groomed. He is morbidly obese. He is not ill-appearing, toxic-appearing or diaphoretic.   Skin:     General: Skin is warm.      Capillary Refill: Capillary refill takes less than 2 seconds.      Findings: Laceration (right thumb) present. No erythema.      Comments: There are 2 sutures at each end of the laceration. The middle of the laceration appears more superficial. There is no erythema or drainage noted on exam. He has good ROM of thumb. See imaging.   Neurological:      Mental Status: He is alert.   Psychiatric:         Behavior: Behavior is cooperative.        Assessment:       1. Laceration of right thumb without foreign body without damage to nail, initial encounter    2. Encounter for removal of sutures        Plan:       Laceration of right thumb without foreign body without damage to nail, initial encounter  The laceration is heeling well. Reports compliance with Augmentin, last dose today. He still having some scant drainage at times. Will leave sutures in until next " week and allow the laceration to heel a little longer since it is near a joint. Do not want to risk dehiscence. Continue current treatment. Wash with soap and water. Keep area protected.     Encounter for removal of sutures  Sutures not removed will leave in until Tues/Wed of next week.    This note was created using [employer] voice recognition software that occasionally misinterprets phrases or words.     I spent a total of 15 minutes on the day of the visit.This includes face to face time and non-face to face time preparing to see the patient (eg, review of tests), obtaining and/or reviewing separately obtained history, documenting clinical information in the electronic or other health record, independently interpreting results and communicating results to the patient/family/caregiver, or care coordinator.    Follow up if symptoms worsen or fail to improve.          6/27/2024 IMELDA Vazquez, FNP

## 2024-07-03 ENCOUNTER — OFFICE VISIT (OUTPATIENT)
Dept: FAMILY MEDICINE | Facility: CLINIC | Age: 69
End: 2024-07-03
Payer: MEDICARE

## 2024-07-03 VITALS
OXYGEN SATURATION: 98 % | TEMPERATURE: 99 F | HEART RATE: 95 BPM | WEIGHT: 306 LBS | BODY MASS INDEX: 45.32 KG/M2 | DIASTOLIC BLOOD PRESSURE: 74 MMHG | HEIGHT: 69 IN | SYSTOLIC BLOOD PRESSURE: 110 MMHG | RESPIRATION RATE: 18 BRPM

## 2024-07-03 DIAGNOSIS — S61.011A LACERATION OF RIGHT THUMB WITHOUT FOREIGN BODY WITHOUT DAMAGE TO NAIL, INITIAL ENCOUNTER: Primary | ICD-10-CM

## 2024-07-03 DIAGNOSIS — Z48.02 ENCOUNTER FOR REMOVAL OF SUTURES: ICD-10-CM

## 2024-07-03 PROCEDURE — 3074F SYST BP LT 130 MM HG: CPT | Mod: HCNC,CPTII,S$GLB, | Performed by: NURSE PRACTITIONER

## 2024-07-03 PROCEDURE — 1126F AMNT PAIN NOTED NONE PRSNT: CPT | Mod: HCNC,CPTII,S$GLB, | Performed by: NURSE PRACTITIONER

## 2024-07-03 PROCEDURE — 99999 PR PBB SHADOW E&M-EST. PATIENT-LVL IV: CPT | Mod: PBBFAC,HCNC,, | Performed by: NURSE PRACTITIONER

## 2024-07-03 PROCEDURE — 3288F FALL RISK ASSESSMENT DOCD: CPT | Mod: HCNC,CPTII,S$GLB, | Performed by: NURSE PRACTITIONER

## 2024-07-03 PROCEDURE — 99213 OFFICE O/P EST LOW 20 MIN: CPT | Mod: HCNC,S$GLB,, | Performed by: NURSE PRACTITIONER

## 2024-07-03 PROCEDURE — 3008F BODY MASS INDEX DOCD: CPT | Mod: HCNC,CPTII,S$GLB, | Performed by: NURSE PRACTITIONER

## 2024-07-03 PROCEDURE — 1101F PT FALLS ASSESS-DOCD LE1/YR: CPT | Mod: HCNC,CPTII,S$GLB, | Performed by: NURSE PRACTITIONER

## 2024-07-03 PROCEDURE — 4010F ACE/ARB THERAPY RXD/TAKEN: CPT | Mod: HCNC,CPTII,S$GLB, | Performed by: NURSE PRACTITIONER

## 2024-07-03 PROCEDURE — 3061F NEG MICROALBUMINURIA REV: CPT | Mod: HCNC,CPTII,S$GLB, | Performed by: NURSE PRACTITIONER

## 2024-07-03 PROCEDURE — 3044F HG A1C LEVEL LT 7.0%: CPT | Mod: HCNC,CPTII,S$GLB, | Performed by: NURSE PRACTITIONER

## 2024-07-03 PROCEDURE — 99024 POSTOP FOLLOW-UP VISIT: CPT | Mod: HCNC,S$GLB,, | Performed by: NURSE PRACTITIONER

## 2024-07-03 PROCEDURE — 3066F NEPHROPATHY DOC TX: CPT | Mod: HCNC,CPTII,S$GLB, | Performed by: NURSE PRACTITIONER

## 2024-07-03 PROCEDURE — 3078F DIAST BP <80 MM HG: CPT | Mod: HCNC,CPTII,S$GLB, | Performed by: NURSE PRACTITIONER

## 2024-07-03 PROCEDURE — 1160F RVW MEDS BY RX/DR IN RCRD: CPT | Mod: HCNC,CPTII,S$GLB, | Performed by: NURSE PRACTITIONER

## 2024-07-03 PROCEDURE — 1159F MED LIST DOCD IN RCRD: CPT | Mod: HCNC,CPTII,S$GLB, | Performed by: NURSE PRACTITIONER

## 2024-07-03 NOTE — PROCEDURES
Suture Removal    Date/Time: 7/3/2024 10:20 AM  Location procedure was performed: [facility] [address] [facility]    Performed by: Braxton Lawson FNP-C  Authorized by: Braxton Lawson FNP-C  Assisting provider: Braxton Lawson FNP-C  Pre-operative diagnosis: Laceration right thumb  Post-operative diagnosis: Closed laceration right thumb  Body area: upper extremity  Location details: right thumb  Wound Appearance: nontender, no drainage and clean  Sutures Removed: 4  Post-removal: no dressing applied  Technical procedures used: suture removal  Significant surgical tasks conducted by the assistant(s): none  Complications: No  Estimated blood loss (mL): 0  Specimens: No  Implants: No  Patient tolerance: Patient tolerated the procedure well with no immediate complications

## 2024-07-03 NOTE — PROGRESS NOTES
SUBJECTIVE:      Patient ID: Marcus Pairsi is a 69 y.o. male.    Chief Complaint: Suture / Staple Removal    69-year-old male presents to the clinic for suture removal. He is an established patient of [facility].     Patient cut his right thumb on a chop saw on . He was seen in the ED. He had a 4-5 cm laceration, closed with 5-0 Nylon sutures x4. Tetanus vaccine update. X-ray showed no fracture.  He was advised to apply Neosporin twice a day as needed for the next 3 days.  Rotate Tylenol and ibuprofen as needed. Started on Augmentin.     The laceration has been heeling well. Completed Augmentin. He his not having any pain. Continues to have good ROM of thumb.        Family History   Problem Relation Name Age of Onset    Cancer Mother      Asthma Mother      Diabetes Mother      Heart disease Mother      Hypertension Mother      Hypertension Father      Crohn's disease Brother      Cancer Maternal Grandmother        Social History     Socioeconomic History    Marital status:    Tobacco Use    Smoking status: Former     Current packs/day: 0.00     Average packs/day: 3.0 packs/day for 5.0 years (15.0 ttl pk-yrs)     Types: Cigarettes     Start date:      Quit date:      Years since quittin.5    Smokeless tobacco: Former     Types: Chew    Tobacco comments:     quit 40 yrs ago   Substance and Sexual Activity    Alcohol use: Not Currently     Comment: occasional    Drug use: No    Sexual activity: Yes     Partners: Female     Social Determinants of Health     Financial Resource Strain: Low Risk  (6/3/2024)    Overall Financial Resource Strain (CARDIA)     Difficulty of Paying Living Expenses: Not hard at all   Food Insecurity: No Food Insecurity (6/3/2024)    Hunger Vital Sign     Worried About Running Out of Food in the Last Year: Never true     Ran Out of Food in the Last Year: Never true   Transportation Needs: No Transportation Needs (2020)    PRAPARE - Transportation      Lack of Transportation (Medical): No     Lack of Transportation (Non-Medical): No   Physical Activity: Inactive (6/3/2024)    Exercise Vital Sign     Days of Exercise per Week: 0 days     Minutes of Exercise per Session: 0 min   Stress: No Stress Concern Present (6/3/2024)    [employer] - Occupational Stress Questionnaire     Feeling of Stress : Not at all   Housing Stability: Unknown (6/3/2024)    Housing Stability Vital Sign     Unable to Pay for Housing in the Last Year: No     Current Outpatient Medications   Medication Sig Dispense Refill    amlodipine-benazepril 5-20 mg (LOTREL) 5-20 mg per capsule Take 1 capsule by mouth once daily. 90 capsule 1    apixaban (ELIQUIS) 5 mg Tab Take 1 tablet (5 mg total) by mouth 2 (two) times daily. 180 tablet 1    atorvastatin (LIPITOR) 20 MG tablet Take 1 tablet (20 mg total) by mouth every evening. 90 tablet 1    cyanocobalamin (VITAMIN B-12) 1000 MCG tablet Take 1,000 mcg by mouth once daily.       levothyroxine (SYNTHROID) 50 MCG tablet TAKE 1 TABLET (50 MCG TOTAL) BY MOUTH BEFORE BREAKFAST. 90 tablet 3    metoprolol succinate (TOPROL-XL) 25 MG 24 hr tablet Take 1 tablet (25 mg total) by mouth every evening. 90 tablet 3    metoprolol succinate (TOPROL-XL) 50 MG 24 hr tablet Take 1 tablet (50 mg total) by mouth every morning. 90 tablet 3    mupirocin (BACTROBAN) 2 % ointment Apply topically 3 (three) times daily.      OMEGA-3 ACID ETHYL ESTERS ORAL Take 1 capsule by mouth once daily.       tadalafiL (CIALIS) 5 MG tablet Take 1 tablet (5 mg total) by mouth once daily. Take one every morning 90 tablet 3    amoxicillin-clavulanate 875-125mg (AUGMENTIN) 875-125 mg per tablet Take 1 tablet by mouth 2 (two) times daily. (Patient not taking: Reported on 7/3/2024) 14 tablet 0     No current facility-administered medications for this visit.     Review of patient's allergies indicates:  No Known Allergies   Past Medical History:   Diagnosis Date     A-fib     Allergy     Anticoagulant long-term use     Arthritis     Asthma     as a child    Hyperlipidemia     Hypertension     Personal history of colonic polyps 03/22/2024    Prostate cancer 2019    Sleep apnea     Use CPAP    Sleep apnea     uses C-PAP     Past Surgical History:   Procedure Laterality Date    CARPAL TUNNEL RELEASE Bilateral     COLONOSCOPY  03/22/2024    3 YR RECALL    CYSTOSCOPY N/A 01/28/2019    Procedure: CYSTOSCOPY;  Surgeon: Delmy Hernandez MD;  Location: [facility] OR;  Service: Urology;  Laterality: N/A;    CYSTOSCOPY N/A 07/10/2023    Procedure: CYSTOSCOPY;  Surgeon: Delmy Hernandez MD;  Location: [facility] OR;  Service: Urology;  Laterality: N/A;    CYSTOURETEROSCOPY WITH RETROGRADE PYELOGRAPHY AND INSERTION OF STENT INTO URETER Left 11/10/2021    Procedure: CYSTOURETEROSCOPY, WITH RETROGRADE PYELOGRAM AND URETERAL STENT INSERTION;  Surgeon: Delmy Hernandez MD;  Location: [facility] OR;  Service: Urology;  Laterality: Left;  please make 1st patient    INSERTION OF IMPLANTABLE LOOP RECORDER N/A 04/23/2021    Procedure: Insertion, Implantable Loop Recorder;  Surgeon: Almaz Rivas MD;  Location: [facility] CATH;  Service: Cardiology;  Laterality: N/A;    ROBOT-ASSISTED LAPAROSCOPIC PROSTATECTOMY USING DA LAVERN XI N/A 07/30/2020    Procedure: XI ROBOTIC PROSTATECTOMY;  Surgeon: Mejia Gold MD;  Location: [facility] OR;  Service: Urology;  Laterality: N/A;    TONSILLECTOMY      TRANSRECTAL BIOPSY OF PROSTATE WITH ULTRASOUND GUIDANCE N/A 01/28/2019    Procedure: BIOPSY, PROSTATE, RECTAL APPROACH, WITH US GUIDANCE;  Surgeon: Delmy Hernandez MD;  Location: [facility] OR;  Service: Urology;  Laterality: N/A;       Review of Systems   Constitutional:  Negative for activity change, appetite change, chills, diaphoresis, fatigue, fever and unexpected weight change.   HENT:  Negative for hearing loss, rhinorrhea and trouble swallowing.    Eyes:  Negative for discharge and visual  "disturbance.   Respiratory:  Negative for chest tightness and wheezing.    Cardiovascular:  Negative for chest pain and palpitations.   Gastrointestinal:  Negative for blood in stool, constipation, diarrhea and vomiting.   Endocrine: Negative for polydipsia and polyuria.   Genitourinary:  Negative for difficulty urinating, hematuria and urgency.   Musculoskeletal:  Negative for arthralgias, joint swelling and neck pain.   Skin:  Positive for wound (laceration right thumb).   Neurological:  Negative for weakness and headaches.   Psychiatric/Behavioral:  Negative for confusion and dysphoric mood.       OBJECTIVE:      Vitals:    07/03/24 1006   BP: 110/74   BP Location: Left arm   Patient Position: Sitting   BP Method: Large (Manual)   Pulse: 95   Resp: 18   Temp: 98.6 °F (37 °C)   TempSrc: Oral   SpO2: 98%   Weight: (!) 138.8 kg (306 lb)   Height: 5' 9" (1.753 m)     Physical Exam  Constitutional:       General: He is awake.      Appearance: He is well-developed and well-groomed. He is morbidly obese. He is not ill-appearing, toxic-appearing or diaphoretic.   Skin:     General: Skin is warm.      Capillary Refill: Capillary refill takes less than 2 seconds.      Findings: Laceration (right thumb) present. No erythema.      Comments: There are 2 sutures at each end of the laceration. The middle of the laceration appears more superficial. There is no drainage, erythema, or heat to the thumb. He has good ROM of thumb.   Neurological:      Mental Status: He is alert.   Psychiatric:         Behavior: Behavior is cooperative.      Suture Removal    Date/Time: 7/3/2024 10:20 AM  Location procedure was performed: [facility]    Performed by: Braxton Lawson FNP-C  Authorized by: Braxton Lawson FNP-C  Assisting provider: Braxton Lawson FNP-C  Pre-operative diagnosis: Laceration right thumb  Post-operative diagnosis: Closed laceration right thumb  Body area: " upper extremity  Location details: right thumb  Wound Appearance: nontender, no drainage and clean  Sutures Removed: 4  Post-removal: no dressing applied  Technical procedures used: suture removal  Significant surgical tasks conducted by the assistant(s): none  Complications: No  Estimated blood loss (mL): 0  Specimens: No  Implants: No  Patient tolerance: Patient tolerated the procedure well with no immediate complications         Assessment:       1. Laceration of right thumb without foreign body without damage to nail, initial encounter    2. Encounter for removal of sutures        Plan:       Laceration of right thumb without foreign body without damage to nail, initial encounter  Laceration has healed well. No pain. Good ROM. Sutures x4 removed. Tolerated well. Continue to protect area to prevent laceration from reopening.   -     Suture Removal    Encounter for removal of sutures  -     Suture Removal    This note was created using [employer] voice recognition software that occasionally misinterprets phrases or words.     I spent a total of 10 minutes on the day of the visit.This includes face to face time and non-face to face time preparing to see the patient (eg, review of tests), obtaining and/or reviewing separately obtained history, documenting clinical information in the electronic or other health record, independently interpreting results and communicating results to the patient/family/caregiver, or care coordinator.    Follow up if symptoms worsen or fail to improve.          7/3/2024 IMELDA Vazquez, FNP

## 2024-08-06 ENCOUNTER — OFFICE VISIT (OUTPATIENT)
Dept: FAMILY MEDICINE | Facility: CLINIC | Age: 69
End: 2024-08-06
Payer: MEDICARE

## 2024-08-06 VITALS
DIASTOLIC BLOOD PRESSURE: 68 MMHG | HEART RATE: 77 BPM | TEMPERATURE: 99 F | BODY MASS INDEX: 43.99 KG/M2 | HEIGHT: 69 IN | SYSTOLIC BLOOD PRESSURE: 110 MMHG | WEIGHT: 297 LBS | OXYGEN SATURATION: 97 %

## 2024-08-06 DIAGNOSIS — H93.8X3 SENSATION OF FULLNESS IN BOTH EARS: ICD-10-CM

## 2024-08-06 DIAGNOSIS — H91.93 DECREASED HEARING OF BOTH EARS: ICD-10-CM

## 2024-08-06 DIAGNOSIS — H61.23 BILATERAL IMPACTED CERUMEN: Primary | ICD-10-CM

## 2024-08-06 PROCEDURE — 3061F NEG MICROALBUMINURIA REV: CPT | Mod: HCNC,CPTII,S$GLB, | Performed by: NURSE PRACTITIONER

## 2024-08-06 PROCEDURE — 1126F AMNT PAIN NOTED NONE PRSNT: CPT | Mod: HCNC,CPTII,S$GLB, | Performed by: NURSE PRACTITIONER

## 2024-08-06 PROCEDURE — 1159F MED LIST DOCD IN RCRD: CPT | Mod: HCNC,CPTII,S$GLB, | Performed by: NURSE PRACTITIONER

## 2024-08-06 PROCEDURE — 3008F BODY MASS INDEX DOCD: CPT | Mod: HCNC,CPTII,S$GLB, | Performed by: NURSE PRACTITIONER

## 2024-08-06 PROCEDURE — 99999 PR PBB SHADOW E&M-EST. PATIENT-LVL IV: CPT | Mod: PBBFAC,HCNC,, | Performed by: NURSE PRACTITIONER

## 2024-08-06 PROCEDURE — 1101F PT FALLS ASSESS-DOCD LE1/YR: CPT | Mod: HCNC,CPTII,S$GLB, | Performed by: NURSE PRACTITIONER

## 2024-08-06 PROCEDURE — 4010F ACE/ARB THERAPY RXD/TAKEN: CPT | Mod: HCNC,CPTII,S$GLB, | Performed by: NURSE PRACTITIONER

## 2024-08-06 PROCEDURE — 69210 REMOVE IMPACTED EAR WAX UNI: CPT | Mod: HCNC,S$GLB,, | Performed by: NURSE PRACTITIONER

## 2024-08-06 PROCEDURE — 3288F FALL RISK ASSESSMENT DOCD: CPT | Mod: HCNC,CPTII,S$GLB, | Performed by: NURSE PRACTITIONER

## 2024-08-06 PROCEDURE — 99213 OFFICE O/P EST LOW 20 MIN: CPT | Mod: HCNC,25,S$GLB, | Performed by: NURSE PRACTITIONER

## 2024-08-06 PROCEDURE — 3074F SYST BP LT 130 MM HG: CPT | Mod: HCNC,CPTII,S$GLB, | Performed by: NURSE PRACTITIONER

## 2024-08-06 PROCEDURE — 3078F DIAST BP <80 MM HG: CPT | Mod: HCNC,CPTII,S$GLB, | Performed by: NURSE PRACTITIONER

## 2024-08-06 PROCEDURE — 3066F NEPHROPATHY DOC TX: CPT | Mod: HCNC,CPTII,S$GLB, | Performed by: NURSE PRACTITIONER

## 2024-08-06 PROCEDURE — 3044F HG A1C LEVEL LT 7.0%: CPT | Mod: HCNC,CPTII,S$GLB, | Performed by: NURSE PRACTITIONER

## 2024-08-06 PROCEDURE — 1160F RVW MEDS BY RX/DR IN RCRD: CPT | Mod: HCNC,CPTII,S$GLB, | Performed by: NURSE PRACTITIONER

## 2024-09-19 ENCOUNTER — HOSPITAL ENCOUNTER (OUTPATIENT)
Dept: RADIOLOGY | Facility: HOSPITAL | Age: 69
Discharge: HOME | End: 2024-09-19
Attending: UROLOGY
Payer: MEDICARE

## 2024-09-19 DIAGNOSIS — N20.0 NEPHROLITHIASIS: ICD-10-CM

## 2024-09-19 DIAGNOSIS — C61 PROSTATE CANCER: ICD-10-CM

## 2024-09-19 PROCEDURE — 76770 US EXAM ABDO BACK WALL COMP: CPT | Mod: 26,,, | Performed by: RADIOLOGY

## 2024-09-19 PROCEDURE — 74018 RADEX ABDOMEN 1 VIEW: CPT | Mod: 26,,, | Performed by: RADIOLOGY

## 2024-09-19 PROCEDURE — 74018 RADEX ABDOMEN 1 VIEW: CPT | Mod: TC

## 2024-09-19 PROCEDURE — 76770 US EXAM ABDO BACK WALL COMP: CPT | Mod: TC

## 2024-09-26 ENCOUNTER — OFFICE VISIT (OUTPATIENT)
Dept: UROLOGY | Facility: CLINIC | Age: 69
End: 2024-09-26
Payer: MEDICARE

## 2024-09-26 DIAGNOSIS — C61 PROSTATE CANCER: Primary | ICD-10-CM

## 2024-09-26 DIAGNOSIS — N20.0 NEPHROLITHIASIS: ICD-10-CM

## 2024-09-26 DIAGNOSIS — N52.1 ERECTILE DYSFUNCTION DUE TO DISEASES CLASSIFIED ELSEWHERE: ICD-10-CM

## 2024-09-26 LAB
BILIRUBIN, UA POC OHS: NEGATIVE
BLOOD, UA POC OHS: ABNORMAL
CLARITY, UA POC OHS: CLEAR
COLOR, UA POC OHS: YELLOW
GLUCOSE, UA POC OHS: NEGATIVE
KETONES, UA POC OHS: NEGATIVE
LEUKOCYTES, UA POC OHS: NEGATIVE
NITRITE, UA POC OHS: NEGATIVE
PH, UA POC OHS: 5.5
POC RESIDUAL URINE VOLUME: 0 ML (ref 0–100)
PROTEIN, UA POC OHS: NEGATIVE
SPECIFIC GRAVITY, UA POC OHS: 1.02
UROBILINOGEN, UA POC OHS: 0.2

## 2024-09-26 PROCEDURE — 1160F RVW MEDS BY RX/DR IN RCRD: CPT | Mod: HCNC,CPTII,S$GLB, | Performed by: UROLOGY

## 2024-09-26 PROCEDURE — 1101F PT FALLS ASSESS-DOCD LE1/YR: CPT | Mod: HCNC,CPTII,S$GLB, | Performed by: UROLOGY

## 2024-09-26 PROCEDURE — 99214 OFFICE O/P EST MOD 30 MIN: CPT | Mod: HCNC,S$GLB,, | Performed by: UROLOGY

## 2024-09-26 PROCEDURE — 51798 US URINE CAPACITY MEASURE: CPT | Mod: HCNC,S$GLB,, | Performed by: UROLOGY

## 2024-09-26 PROCEDURE — 3061F NEG MICROALBUMINURIA REV: CPT | Mod: HCNC,CPTII,S$GLB, | Performed by: UROLOGY

## 2024-09-26 PROCEDURE — 3066F NEPHROPATHY DOC TX: CPT | Mod: HCNC,CPTII,S$GLB, | Performed by: UROLOGY

## 2024-09-26 PROCEDURE — 1159F MED LIST DOCD IN RCRD: CPT | Mod: HCNC,CPTII,S$GLB, | Performed by: UROLOGY

## 2024-09-26 PROCEDURE — 3044F HG A1C LEVEL LT 7.0%: CPT | Mod: HCNC,CPTII,S$GLB, | Performed by: UROLOGY

## 2024-09-26 PROCEDURE — 1126F AMNT PAIN NOTED NONE PRSNT: CPT | Mod: HCNC,CPTII,S$GLB, | Performed by: UROLOGY

## 2024-09-26 PROCEDURE — 99999 PR PBB SHADOW E&M-EST. PATIENT-LVL II: CPT | Mod: PBBFAC,HCNC,, | Performed by: UROLOGY

## 2024-09-26 PROCEDURE — 4010F ACE/ARB THERAPY RXD/TAKEN: CPT | Mod: HCNC,CPTII,S$GLB, | Performed by: UROLOGY

## 2024-09-26 PROCEDURE — 81003 URINALYSIS AUTO W/O SCOPE: CPT | Mod: QW,HCNC,S$GLB, | Performed by: UROLOGY

## 2024-09-26 PROCEDURE — 3288F FALL RISK ASSESSMENT DOCD: CPT | Mod: HCNC,CPTII,S$GLB, | Performed by: UROLOGY

## 2024-09-26 NOTE — PROGRESS NOTES
[facility], formerly known as [facility]  Group MD's:Mary/Fanny/Micha/Leslie/Jyotsna  Group NP's: Francoise Rubin, Briseida Zapata    PCP: Lois Arevalo FNP-C  Date of Service: 09/26/2024  Today's note written by: MD Mary       Chief Complaint: prostate cancer     Subjective:        HPI: Marcus Parisi is a 64 y.o. male presents with  Prostate cancer, T2c Gl3+3, T2c psa 2.9, NxMx prostate nodules, B apex and MH.  He was initially seen by me in 2018 for rising psa.   He was found to have Gl 3+3 in 4/14 cores Jan 2019 and was referred due to insurance issues to .    took him for a RALP on 7/1/20. Gl 4+3 with teriary 5 in <5%. Tumor involves 20 to 25% of prostate. Right apical and Left posterior margins involved by cancer.  Also had a h/o stones 10+ years ago. Had a stent but leaked continuously   9/11/21 psa at [facility] <0.1  10/27/21: Saw francoise on 10/27/21 and was c/o gross hematuria. CTU showed mid to distal L ureteral 3mm stone. Pt wanted it removed.     Interval history 11/3/21  He is here in preop and says he hasn't had any hematuria in 3 to 4 days. He's had no flank pain in 3 weeks. He never received flomax ([facility]). He also has not passed stone.  Prostate cancer- still has some incontinence  He was discharged on flomax with a plan for a repeat ctrss     Interval history 11/10/21:  ctrss 11/8/21 showed L ureteral 4mm stone in same position with moderate hydro, no left renal stones. 2, 2mm stones in right kidney. Long bladder . Therefore here today for cystoscopy and ureteroscopy to extract stone as long as no infection beyond stone   psa is 0.03 (no previous super sensitive psa's to go by)  Still with LUIS ENRIQUE        Interval history 2/8/22:  11/10/21 cysto L urs and stone extraction. Stone was not radioopaque. Inflammation around stone. Found to have BNC. Left stent on strings. Stones mostly calcium oxalate.   1/25/22 psa up to  0.04  rbus 2/3/22: 8mm stone on L wo hydro. No stone on right  kub 2/3/22: no stones seen   Hasn't passed any stones. Didn't notice much diffference after procedure  (no increased flow) and denies any slow flow. Denies any frequency or ugency. Still has LUIS ENRIQUE.     Interval history since last visit with me:  Uroflow results (date: 02/16/2022): Voiding time: 21.1s, Flow time: 17.2s, TTP flow: 9.3s, Peak flowrate: 24.1 mL/s, Average flowrate: 13.5mL/s, Intervals: 2, Voided volume: 233 mL, Pvr by bladder scan: 0mL . Pattern of curve: see uploaded image, reviewed by     5/17/122 0.04    5/24/22: He apparently is using cialis 20mg daily. Should not be doing this daily. Not interested in LAURA. Not a good candidate for inection. On eliquis.     Interval history 11/29/22:  Had a psa done on 11/22/22 and was 0.05. still urinating well with no issues.   Using cialis as needed now, not daily. Doing well on this.   Ua today with tr bld but no uti sx. No flank pain.     Interval history 3/6/23:  He returns today with a repeat PSA done on 02/28/2023, up to 0.07 now.  He also had a kidney ultrasound on 02/28/2023 showing one  6 mm stone in each kidney, KUB (Xray of the abdomen to look for stones) 02/28/2023 showing no stones  Voided urine today shows trace blood.  Denies any flank pain or gross hematuria.  No back pain.  Stays active.on cialis as needed.   History of bladder neck contracture but denies any difficulty urinating.  Good flow.  He says that he has been having stress incontinence since his prostatectomy however his nighttime incontinence has improved significantly.  Incontinence most noticeable with strenuous activity.  Wears 1 depends or liner during the day.  nighttime depends now dry. Only urinates 3x during the day. Does do kegels.         Interval history 6/20/23:  Here today to discuss his PSA.  We have been monitoring it since it is slowly rising.  PSA up to 0.09.  0.02 higher than 3 months ago.  Has not had any  back pain.  Also of note his urine today shows large blood.  Last time it only showed trace blood.  He denies any gross hematuria.  He is on Eliquis.  Denies any flank pain.  Plan was to get an x-ray in February.    Interval history 7/10/23:  6/20/23 ua showed large blood, 20 rbc- scheduled him for ctu and cysto.  6/27/23 called c/o uti sx. Urine sent for u/a, urine culture and urine cytology.   6/27/23 cytology neg  Ucx 6/27/23: e.coli. ua with 3+leuk/nit+/3+bld, ua neil - 20 rbc/>100 wbc/mod bact, tx with cipro bid x 10d. Finished a few days ago.   Had another psa on 7/1/23 and now up to 0.14  Ctu 7/1/23 for uti.   1. Cholelithiasis.- gallstones, see pcp or GI if having pain in right upper quadrant, nausea or vomiting  2. Splenomegaly.- large spleen, he can see pcp for this  3. Small nonobstructing right renal calculi.- tiny stones on right can cause blood in urine  4. Small subcentimeter renal cysts.- nothing to do for this      Cystoscopy on 7/10/23: no stricture and no tumors    Interval history by ME in CLINIC on 9/21/213 for prostate cancer fu and stone history:  Today he staesGood stream (does have a h/o bladder neck contracture). Pvr: 32  Independent Review of Ctu 7/1/23 again today:  shows 4 tiny stones on right, non obstructing. No ureteral cyst. Cystic lesion in the pelvis, stable. .  Small chronic stable 3.2 cm cystic lesion of the left paramedian pelvis at the level of the pubic symphysis again slightly displacing the bladder.            Psa 9/14/23 back down to 0.10 (could have been elevated due prostatitis.)  Ua today with tr bld    Interval history by ME/ in CLINIC on 3/21/24 for prostate cancer, kidney stones, ED:  Kidney stones- 4 tiny right stones. Denies any R flank pain. No GH. Ua today with r bld only.   BNC with slow flow:  at last visit told him he could stop the flomax if he wants to see if it made a difference but he said that he didn't want to discontinue bc he felt like it  had helped when he first started and doesn' mind taking.   ED: at last visit was on cialis 5mg daily and that's what my plan had said. He said that I had told him not to take it every day. On his med list he has 20mg. He said that he was taking cialis once daily up until a month ago and he ran out.  H/o Prostate cancer, Gl 4+3 with tertiary 5 in <5% s/p ralp in 7/2020 with + margins.  Recent psa rise likely due to prostatitis.  Psa ginny by 0.02 over 6 months. Previously was rising 0.02 every 3 months.     Interval history by ME/ in CLINIC on 9/26/24:    H/o Prostate cancer, Gl 4+3 with tertiary 5 in <5% s/p ralp in 7/2020 with + margins.    Recent PSA on 09/1924 up to 0.14.  Successive rises over the last year.  It was as high as 0.14 a year ago however.    Kidney stones- 4 tiny right stones  He has not passed any stones in the last 6 months.  He did do an ultrasound on 9/19 and it showed bilateral 6 mm stones without hydro.  The x-ray did not clearly show any stones.  His voided urine today shows trace blood.  He denies any gross hematuria flank pain    Ed-   At his last visit I started him on tadalafil 5 mg daily and 20 mg as needed.  He has been doing this regimen and feels like this works for him if he just uses the 5 mg daily does not have good response    I also discontinued his Flomax at his last visit because he no longer has prostate and he has not noticed any difference in his urinary symptoms in his PVR today is 0.  He is urinating without any issue with a good flow        psa history   9/26/24 0.14  3/11/24  0.12  9/21/23 Pvr: 32  9/14/23 0.10  7/1/23  0.14  6/6/23  0.09  2/28/23 0.07  11/22/22 0.05  5/24/22  pvr by scan: 11  5/17/22 0.04   2/8/22  pvr by scan: 0  1/25/22 0.04  11/8/21 0.03  9/11/21 <0.1   7/31/20 T3a RALP Gl 4+3 with teriary 5 in <5%. Tumor involves 20 to 25% of prostate.   1/28/19  psa 2.9, trus vol: 40.5g, T2c (b palp nodules), Gl 3+3 in RBM, LML, LONDONO, LTZ (4/14).  HGPIN  in  RBL, RBM (2/14). ASAP in SHELLY (1/14)  01/14/19          2.9, %free 15.5, JAHAIRA: 20g, definite nodule on left apex and one on right apex.   10/9/18            JAHAIRA: 20g gland without any discrete masses, left side however more firm, no tenderness.  9/18/18            2.1  9/2017             1.2      Urine history: eliquis for afib. No tobacco hx. + stones  09/26/2024 trace blood.   9/21/23 Tr bld  7/10/23 Neg  6/27/23 E.coli, 3+bld/nit+/3+leuk, 20 rbc/>100 wbc/mod bacter  6/20/23 Large bld, 20 rbc/few bact  3/6/23  Tr bld  5/24/22 Tr bld  02/8/22 neg  11/10/21 90%calcium oxalate/10% calcium phosphate  10/27/21 Ng, void: red, 3+bld, >100 rbc, cyt: neg.   6/19/21 coreen  1/28/19 Ng, void: tr wbc- no sx of uti  1/21/19 ng, void: neg  1/15/19 No cx, void: tr blood, cytology: negative  10/9/18            No cx, void: neg       REVIEW OF SYSTEMS:neg  Objective:      There were no vitals filed for this visit.              Assessment:         Marcus Parisi is a 69 y.o. male with     1. Prostate cancer    2. Nephrolithiasis    3. Erectile dysfunction due to diseases classified elsewhere             Plan:     H/o Prostate cancer, Gl 4+3 with tertiary 5 in <5% s/p ralp in 7/2020 with + margins.    PSA has only risen by 0.02 again over 6 months.  Now up to 0.14 it was actually this a year ago.  We did tried to plug it into a nomogram but it does not computer unless his PSA is greater than 0.2 in his time from surgery is less than 20 months.  Because it is rises a slow a think we can go ahead and repeat another PSA in 6 months.  And then if PSA is greater than 0.2 order a PSMA scan to look for any metastatic disease.  He could have some micrometastasis but it is slow-growing if present    Kidney stones- 4 tiny right stones  He has known for stones seen on CT from July 20, 2023.  No hydronephrosis on ultrasound.  Probably difficult to see the stones on x-ray.  We can continue to monitor these stones.  However I would changes to 1 year  from now and get an x-ray and ultrasound in a year instead in September 20, 2025    Ed-   Continue tadalafil 5 mg daily in 20 mg as needed on in addition to this.  Receiving from [facility].  Should have 1 year supply      F/u in 6 months with diagnostic psa prior and ua check                  Generally speaking, PSMA PET Scans using PSMA (18F-DCFPyL) have been shown to be effective at PSA levels above 0.2ng/mL. Also generally speaking, the higher the PSA level (when restaging patients), the more likely a PSMA PET Scan is to detect and identify recurrent prostate cancer.  psa diagnostic in 6 months and f/u after (standing orders in).   But if psa rising, will order prostate mri to eval for any local recurrence at sites of positive margins.         PSA doubling time info:  https://www.mdcalc.com/calc/46471/psa-doubling-time-psadt-calculator#evidence  https://jamanetwork.com/journals/kristin/fullarticle/522750  The length of time after surgery prior to biochemical recurrence was important in determining the risk of eventual distant failure for men with lower (5, 6, and 7) and men with high (8, 9, and 10) Maricarmen scores (Figure 4). A similar observation was made previously by Rahul et al22 in a report demonstrating that a high Maricarmen score and advanced pathologic stage were important in determining the likelihood of local or distant failure. Using a cutoff of 10 months, PSADT provided further substratification for men with a Dalton score of less than 8. Men with rapid PSA level elevation (<2 years), a Dalton score of 5 to 7, and a PSADT (>10 months) demonstrated a 76% probability of remaining free of metastatic disease for 5 years following initial PSA level elevation compared with men with a shorter PSADT (<10 months) who had only 35% chance of remaining free of metastatic disease for 5 years after biochemical recurrence. Although not as strong as Dalton score, time of biochemical recurrence, and PSADT, the pathologic  stage did contribute to the likelihood of distant metastasis (P=.01). The PSADT has been suggested by Kong et al13 as a useful predictor of the type of eventual recurrence after radical prostatectomy. They measured the PSADT for a group of 77 men with biochemical recurrence following radical retropubic prostatectomy and found that shorter PSADTs (<6 months) were more indicative of distant disease when compared with local recurrence.

## 2024-09-26 NOTE — PATIENT INSTRUCTIONS
H/o Prostate cancer, Gl 4+3 with tertiary 5 in <5% s/p ralp in 7/2020 with + margins.    PSA has only risen by 0.02 again over 6 months.  Now up to 0.14 it was actually this a year ago.  We did tried to plug it into a nomogram but it does not computer unless his PSA is greater than 0.2 in his time from surgery is less than 20 months.  Because it is rises a slow a think we can go ahead and repeat another PSA in 6 months.  And then if PSA is greater than 0.2 order a PSMA scan to look for any metastatic disease.  He could have some micrometastasis but it is slow-growing if present    Kidney stones- 4 tiny right stones  He has known for stones seen on CT from July 20, 2023.  No hydronephrosis on ultrasound.  Probably difficult to see the stones on x-ray.  We can continue to monitor these stones.  However I would changes to 1 year from now and get an x-ray and ultrasound in a year instead in September 20, 2025    Ed-   Continue tadalafil 5 mg daily in 20 mg as needed on in addition to this.  Receiving from [facility].  Should have 1 year supply      F/u in 6 months with diagnostic psa prior and ua check

## 2024-10-14 DIAGNOSIS — E78.5 DYSLIPIDEMIA: ICD-10-CM

## 2024-10-15 RX ORDER — TAMSULOSIN HYDROCHLORIDE 0.4 MG/1
CAPSULE ORAL
Qty: 90 CAPSULE | Refills: 3 | Status: SHIPPED | OUTPATIENT
Start: 2024-10-15

## 2024-10-16 RX ORDER — ATORVASTATIN CALCIUM 20 MG/1
20 TABLET, FILM COATED ORAL NIGHTLY
Qty: 90 TABLET | Refills: 3 | Status: SHIPPED | OUTPATIENT
Start: 2024-10-16

## 2024-10-16 RX ORDER — AMLODIPINE AND BENAZEPRIL HYDROCHLORIDE 5; 20 MG/1; MG/1
1 CAPSULE ORAL
Qty: 90 CAPSULE | Refills: 3 | Status: SHIPPED | OUTPATIENT
Start: 2024-10-16

## 2024-10-16 RX ORDER — LEVOTHYROXINE SODIUM 50 UG/1
50 TABLET ORAL
Qty: 90 TABLET | Refills: 3 | Status: SHIPPED | OUTPATIENT
Start: 2024-10-16

## 2024-12-02 ENCOUNTER — TELEPHONE (OUTPATIENT)
Dept: FAMILY MEDICINE | Facility: CLINIC | Age: 69
End: 2024-12-02
Payer: MEDICARE

## 2024-12-02 NOTE — TELEPHONE ENCOUNTER
----- Message from Amanda sent at 12/2/2024 11:54 AM CST -----  Pt would like a call back from Amarilys. Pt has sore throat with no fever, mucus, and coughing. Pt would like to know what to do.    [phone]

## 2024-12-05 ENCOUNTER — OFFICE VISIT (OUTPATIENT)
Dept: FAMILY MEDICINE | Facility: CLINIC | Age: 69
End: 2024-12-05
Payer: MEDICARE

## 2024-12-05 VITALS
OXYGEN SATURATION: 98 % | WEIGHT: 293.31 LBS | BODY MASS INDEX: 43.44 KG/M2 | HEIGHT: 69 IN | SYSTOLIC BLOOD PRESSURE: 122 MMHG | TEMPERATURE: 99 F | HEART RATE: 86 BPM | DIASTOLIC BLOOD PRESSURE: 86 MMHG

## 2024-12-05 DIAGNOSIS — R09.81 SINUS CONGESTION: ICD-10-CM

## 2024-12-05 DIAGNOSIS — R05.1 ACUTE COUGH: Primary | ICD-10-CM

## 2024-12-05 DIAGNOSIS — J06.9 UPPER RESPIRATORY TRACT INFECTION, UNSPECIFIED TYPE: ICD-10-CM

## 2024-12-05 LAB
CTP QC/QA: YES
CTP QC/QA: YES
POC MOLECULAR INFLUENZA A AGN: NEGATIVE
POC MOLECULAR INFLUENZA B AGN: NEGATIVE
SARS-COV-2 RDRP RESP QL NAA+PROBE: NEGATIVE

## 2024-12-05 PROCEDURE — 99999 PR PBB SHADOW E&M-EST. PATIENT-LVL III: CPT | Mod: PBBFAC,HCNC,, | Performed by: NURSE PRACTITIONER

## 2024-12-05 RX ORDER — PROMETHAZINE HYDROCHLORIDE AND DEXTROMETHORPHAN HYDROBROMIDE 6.25; 15 MG/5ML; MG/5ML
5 SYRUP ORAL EVERY 8 HOURS PRN
Qty: 120 ML | Refills: 0 | Status: SHIPPED | OUTPATIENT
Start: 2024-12-05

## 2024-12-05 RX ORDER — AZITHROMYCIN 250 MG/1
TABLET, FILM COATED ORAL
Qty: 6 TABLET | Refills: 0 | Status: SHIPPED | OUTPATIENT
Start: 2024-12-05 | End: 2024-12-10

## 2024-12-05 NOTE — PROGRESS NOTES
SUBJECTIVE:      Patient ID: Marcus Parisi is a 69 y.o. male.    Chief Complaint: Rhinitis, Cough (Onset 4 days ago ), and Chest Congestion    History of Present Illness    CHIEF COMPLAINT:  Mr. Parisi presents with a suspected sinus infection and related symptoms, seeking evaluation after his spouse recently experienced similar symptoms.    HPI:  Mr. Parisi reports symptoms consistent with a sinus infection, which his spouse initially developed and was evaluated by a physician earlier in the week. Symptoms include cough without chest congestion, and nasal congestion with yellowish to greenish mucus upon nasal expectoration. Symptoms are particularly bothersome at night when using his CPAP machine.    Mr. Parisi has been using OTC Jyotsna-Ringwood to manage his symptoms, which he believes is helping to alleviate the congestion. He typically has this type of sinus issue once or twice annually, usually in late summer or early fall. Mr. Parisi notes symptom improvement but sought medical evaluation to rule out underlying issues.    Mr. Parisi reports being in atrial fibrillation at the start of the physical exam. He denies facial pressure, sinus pressure, chest congestion, or headaches.    MEDICATIONS:  Mr. Parisi is on Jyotsna-Ringwood as needed for cold symptoms, which he reports to be effective. He uses CPAP nightly for sleep.    MEDICAL HISTORY:  Mr. Parisi has a history of atrial fibrillation and recurrent sinus infections. He received a flu vaccine approximately 2 months ago.    TEST RESULTS:  Mr. Parisi underwent COVID and flu tests today, both of which were negative.        Review of Systems   Constitutional:  Negative for activity change, appetite change, chills, diaphoresis, fatigue, fever and unexpected weight change.   HENT:  Positive for congestion and postnasal drip. Negative for ear pain, sinus pressure, sinus pain, sore throat, trouble swallowing and voice change.    Eyes:  Negative for pain, discharge and visual disturbance.    Respiratory:  Positive for cough. Negative for chest tightness, shortness of breath and wheezing.    Cardiovascular:  Negative for chest pain and palpitations.   Gastrointestinal:  Negative for abdominal pain, constipation, diarrhea, nausea and vomiting.   Genitourinary:  Negative for difficulty urinating, flank pain, frequency and urgency.   Musculoskeletal:  Negative for back pain and joint swelling.   Skin:  Negative for color change and rash.   Neurological:  Negative for dizziness, seizures, syncope, weakness, numbness and headaches.   Hematological:  Negative for adenopathy.   Psychiatric/Behavioral:  Negative for dysphoric mood and sleep disturbance. The patient is not nervous/anxious.        Family History   Problem Relation Name Age of Onset    Cancer Mother      Asthma Mother      Diabetes Mother      Heart disease Mother      Hypertension Mother      Hypertension Father      Crohn's disease Brother      Cancer Maternal Grandmother        Social History     Socioeconomic History    Marital status:    Tobacco Use    Smoking status: Former     Current packs/day: 0.00     Average packs/day: 3.0 packs/day for 5.0 years (15.0 ttl pk-yrs)     Types: Cigarettes     Start date:      Quit date:      Years since quittin.9    Smokeless tobacco: Former     Types: Chew    Tobacco comments:     quit 40 yrs ago   Substance and Sexual Activity    Alcohol use: Not Currently     Comment: occasional    Drug use: No    Sexual activity: Yes     Partners: Female     Social Drivers of Health     Financial Resource Strain: Low Risk  (6/3/2024)    Overall Financial Resource Strain (CARDIA)     Difficulty of Paying Living Expenses: Not hard at all   Food Insecurity: No Food Insecurity (6/3/2024)    Hunger Vital Sign     Worried About Running Out of Food in the Last Year: Never true     Ran Out of Food in the Last Year: Never true   Transportation Needs: No Transportation Needs (2020)    PRAPARE -  Transportation     Lack of Transportation (Medical): No     Lack of Transportation (Non-Medical): No   Physical Activity: Inactive (6/3/2024)    Exercise Vital Sign     Days of Exercise per Week: 0 days     Minutes of Exercise per Session: 0 min   Stress: No Stress Concern Present (6/3/2024)    [employer] - Occupational Stress Questionnaire     Feeling of Stress : Not at all   Housing Stability: Unknown (6/3/2024)    Housing Stability Vital Sign     Unable to Pay for Housing in the Last Year: No     Current Outpatient Medications   Medication Sig Dispense Refill    amlodipine-benazepril 5-20 mg (LOTREL) 5-20 mg per capsule TAKE 1 CAPSULE EVERY DAY 90 capsule 3    apixaban (ELIQUIS) 5 mg Tab Take 1 tablet (5 mg total) by mouth 2 (two) times daily. 180 tablet 1    atorvastatin (LIPITOR) 20 MG tablet TAKE 1 TABLET EVERY EVENING 90 tablet 3    cyanocobalamin (VITAMIN B-12) 1000 MCG tablet Take 1,000 mcg by mouth once daily.       levothyroxine (SYNTHROID) 50 MCG tablet TAKE 1 TABLET BY MOUTH BEFORE BREAKFAST. 90 tablet 3    metoprolol succinate (TOPROL-XL) 25 MG 24 hr tablet Take 1 tablet (25 mg total) by mouth every evening. 90 tablet 3    metoprolol succinate (TOPROL-XL) 50 MG 24 hr tablet TAKE 1 TABLET (50 MG TOTAL) BY MOUTH EVERY MORNING. 90 tablet 1    OMEGA-3 ACID ETHYL ESTERS ORAL Take 1 capsule by mouth once daily.       tadalafiL (CIALIS) 5 MG tablet Take 1 tablet (5 mg total) by mouth once daily. Take one every morning 90 tablet 3    azithromycin (Z-STEPH) 250 MG tablet Take 2 tablets by mouth on day 1; Take 1 tablet by mouth on days 2-5 6 tablet 0    promethazine-dextromethorphan (PROMETHAZINE-DM) 6.25-15 mg/5 mL Syrp Take 5 mLs by mouth every 8 (eight) hours as needed (cough). 120 mL 0     No current facility-administered medications for this visit.     Review of patient's allergies indicates:   Allergen Reactions    Adhesive       Past Medical History:   Diagnosis Date    A-fib   "   Allergy     Anticoagulant long-term use     Arthritis     Asthma     as a child    Hyperlipidemia     Hypertension     Personal history of colonic polyps 03/22/2024    Prostate cancer 2019    Sleep apnea     Use CPAP    Sleep apnea     uses C-PAP     Past Surgical History:   Procedure Laterality Date    CARPAL TUNNEL RELEASE Bilateral     COLONOSCOPY  03/22/2024    3 YR RECALL    CYSTOSCOPY N/A 01/28/2019    Procedure: CYSTOSCOPY;  Surgeon: Delmy Hernandez MD;  Location: [facility] OR;  Service: Urology;  Laterality: N/A;    CYSTOSCOPY N/A 07/10/2023    Procedure: CYSTOSCOPY;  Surgeon: Delmy Hernandez MD;  Location: [facility] OR;  Service: Urology;  Laterality: N/A;    CYSTOURETEROSCOPY WITH RETROGRADE PYELOGRAPHY AND INSERTION OF STENT INTO URETER Left 11/10/2021    Procedure: CYSTOURETEROSCOPY, WITH RETROGRADE PYELOGRAM AND URETERAL STENT INSERTION;  Surgeon: Delmy Hernandez MD;  Location: [facility] OR;  Service: Urology;  Laterality: Left;  please make 1st patient    INSERTION OF IMPLANTABLE LOOP RECORDER N/A 04/23/2021    Procedure: Insertion, Implantable Loop Recorder;  Surgeon: Almaz Rivas MD;  Location: [facility] CATH;  Service: Cardiology;  Laterality: N/A;    ROBOT-ASSISTED LAPAROSCOPIC PROSTATECTOMY USING DA LAVERN XI N/A 07/30/2020    Procedure: XI ROBOTIC PROSTATECTOMY;  Surgeon: Mejia Gold MD;  Location: [facility] OR;  Service: Urology;  Laterality: N/A;    TONSILLECTOMY      TRANSRECTAL BIOPSY OF PROSTATE WITH ULTRASOUND GUIDANCE N/A 01/28/2019    Procedure: BIOPSY, PROSTATE, RECTAL APPROACH, WITH US GUIDANCE;  Surgeon: Delmy Hernandez MD;  Location: [facility] OR;  Service: Urology;  Laterality: N/A;          OBJECTIVE:      Vitals:    12/05/24 1407   BP: 122/86   BP Location: Left arm   Patient Position: Sitting   Pulse: 86   Temp: 98.6 °F (37 °C)   TempSrc: Oral   SpO2: 98%   Weight: 133 kg (293 lb 4.8 oz)   Height: 5' 9" (1.753 m)     Physical Exam  Vitals and nursing note " reviewed.   Constitutional:       General: He is awake. He is not in acute distress.     Appearance: He is well-developed and well-groomed. He is morbidly obese. He is not ill-appearing, toxic-appearing or diaphoretic.   HENT:      Head: Normocephalic and atraumatic.      Right Ear: Tympanic membrane and external ear normal.      Left Ear: Tympanic membrane, ear canal and external ear normal.      Nose: Congestion present.      Right Sinus: No maxillary sinus tenderness or frontal sinus tenderness.      Left Sinus: No maxillary sinus tenderness or frontal sinus tenderness.      Mouth/Throat:      Comments: Wearing mask  Eyes:      General: Lids are normal. Gaze aligned appropriately.      Conjunctiva/sclera: Conjunctivae normal.      Right eye: Right conjunctiva is not injected.      Left eye: Left conjunctiva is not injected.      Pupils: Pupils are equal, round, and reactive to light.   Cardiovascular:      Rate and Rhythm: Normal rate. Rhythm irregularly irregular.      Pulses: Normal pulses.      Heart sounds: Normal heart sounds, S1 normal and S2 normal. No murmur heard.  Pulmonary:      Effort: Pulmonary effort is normal. No respiratory distress.      Breath sounds: Normal breath sounds. No stridor. No decreased breath sounds, wheezing, rhonchi or rales.   Chest:      Chest wall: No tenderness.   Musculoskeletal:      Cervical back: Neck supple.   Lymphadenopathy:      Cervical: No cervical adenopathy.   Skin:     General: Skin is warm and dry.      Capillary Refill: Capillary refill takes less than 2 seconds.      Findings: No erythema or rash.   Neurological:      Mental Status: He is alert and oriented to person, place, and time. Mental status is at baseline.   Psychiatric:         Attention and Perception: Attention normal.         Mood and Affect: Mood normal.         Speech: Speech normal.         Behavior: Behavior normal. Behavior is cooperative.         Thought Content: Thought content normal.          Judgment: Judgment normal.       Office Visit on 12/05/2024   Component Date Value Ref Range Status    POC Rapid COVID 12/05/2024 Negative  Negative Final     Acceptable 12/05/2024 Yes   Final    POC Molecular Influenza A Ag 12/05/2024 Negative  Negative Final    POC Molecular Influenza B Ag 12/05/2024 Negative  Negative Final     Acceptable 12/05/2024 Yes   Final   ]     Assessment:       1. Acute cough    2. Sinus congestion    3. Upper respiratory tract infection, unspecified type        Plan:       Assessment & Plan    IMPRESSION:  - Assessed symptoms suggestive of upper respiratory infection, likely viral in origin given household spread and symptom duration  - Ruled out sinusitis due to lack of facial/sinus pressure  - Ordered COVID and flu tests to rule out these conditions, as patient is within treatment window for COVID  - Considered prescribing Z-steph prophylactically to prevent potential bacterial infection over the weekend, despite symptoms improving  - Recommend symptomatic treatment with OTC medications, as symptoms are improving and likely viral    COMMON COLD:  - Explained typical timeline for viral vs. bacterial infections, noting it usually takes 7-10 days for a bacterial infection to develop.  - Discussed how post-nasal drip can trigger coughing.  - Mr. Parisi to continue using Jyotsna-Burton if it is effective.  - Mr. Parisi to consider using Flonase or Zyrtec for symptom relief.  - Started azithromycin (Z-steph) to be taken only if symptoms worsen over the next 3 days.  - Started promethazine DM elixir as needed for cough.    DIAGNOSTIC TESTS:  - COVID-19 test ordered.  - Influenza test ordered.    FOLLOW-UP:  - Follow up with Dr. Arevalo at scheduled appointment on Wednesday.  - Inform Dr. Arevalo about current illness at the follow-up visit.        Acute cough  -     POCT COVID-19 Rapid Screening  -     POCT Influenza A/B Molecular  -     azithromycin (Z-STEPH) 250 MG  tablet; Take 2 tablets by mouth on day 1; Take 1 tablet by mouth on days 2-5  Dispense: 6 tablet; Refill: 0  -     promethazine-dextromethorphan (PROMETHAZINE-DM) 6.25-15 mg/5 mL Syrp; Take 5 mLs by mouth every 8 (eight) hours as needed (cough).  Dispense: 120 mL; Refill: 0    Sinus congestion    Upper respiratory tract infection, unspecified type  -     azithromycin (Z-STEPH) 250 MG tablet; Take 2 tablets by mouth on day 1; Take 1 tablet by mouth on days 2-5  Dispense: 6 tablet; Refill: 0  -     promethazine-dextromethorphan (PROMETHAZINE-DM) 6.25-15 mg/5 mL Syrp; Take 5 mLs by mouth every 8 (eight) hours as needed (cough).  Dispense: 120 mL; Refill: 0    I spent a total of 15 minutes on the day of the visit.This includes face to face time and non-face to face time preparing to see the patient (eg, review of tests), obtaining and/or reviewing separately obtained history, documenting clinical information in the electronic or other health record, independently interpreting results and communicating results to the patient/family/caregiver, or care coordinator.    No follow-ups on file.          12/5/2024 IMELDA Vazquez, ADALBERTO    This note was generated with the assistance of ambient listening technology. Verbal consent was obtained by the patient and accompanying visitor(s) for the recording of patient appointment to facilitate this note. I attest to having reviewed and edited the generated note for accuracy, though some syntax or spelling errors may persist. Please contact the author of this note for any clarification.

## 2024-12-11 ENCOUNTER — OFFICE VISIT (OUTPATIENT)
Dept: FAMILY MEDICINE | Facility: CLINIC | Age: 69
End: 2024-12-11
Payer: MEDICARE

## 2024-12-11 ENCOUNTER — TELEPHONE (OUTPATIENT)
Dept: FAMILY MEDICINE | Facility: CLINIC | Age: 69
End: 2024-12-11

## 2024-12-11 VITALS — BODY MASS INDEX: 43.31 KG/M2 | HEIGHT: 69 IN

## 2024-12-11 DIAGNOSIS — E78.5 DYSLIPIDEMIA: ICD-10-CM

## 2024-12-11 DIAGNOSIS — E55.9 VITAMIN D INSUFFICIENCY: ICD-10-CM

## 2024-12-11 DIAGNOSIS — E78.5 DYSLIPIDEMIA: Primary | ICD-10-CM

## 2024-12-11 DIAGNOSIS — I10 ESSENTIAL (PRIMARY) HYPERTENSION: ICD-10-CM

## 2024-12-11 DIAGNOSIS — E03.9 ACQUIRED HYPOTHYROIDISM: Primary | ICD-10-CM

## 2024-12-11 PROCEDURE — 99499 UNLISTED E&M SERVICE: CPT | Mod: HCNC,S$GLB,, | Performed by: NURSE PRACTITIONER

## 2024-12-12 LAB
25(OH)D3+25(OH)D2 SERPL-MCNC: 41 NG/ML (ref 30–100)
ALBUMIN SERPL-MCNC: 4.3 G/DL (ref 3.6–5.1)
ALBUMIN/GLOB SERPL: 1.9 (CALC) (ref 1–2.5)
ALP SERPL-CCNC: 90 U/L (ref 35–144)
ALT SERPL-CCNC: 14 U/L (ref 9–46)
APPEARANCE UR: CLEAR
AST SERPL-CCNC: 15 U/L (ref 10–35)
BASOPHILS # BLD AUTO: 41 CELLS/UL (ref 0–200)
BASOPHILS NFR BLD AUTO: 0.8 %
BILIRUB SERPL-MCNC: 2.8 MG/DL (ref 0.2–1.2)
BILIRUB UR QL STRIP: NEGATIVE
BUN SERPL-MCNC: 20 MG/DL (ref 7–25)
BUN/CREAT SERPL: ABNORMAL (CALC) (ref 6–22)
CALCIUM SERPL-MCNC: 9.4 MG/DL (ref 8.6–10.3)
CHLORIDE SERPL-SCNC: 101 MMOL/L (ref 98–110)
CHOLEST SERPL-MCNC: 115 MG/DL
CHOLEST/HDLC SERPL: 4.1 (CALC)
CO2 SERPL-SCNC: 29 MMOL/L (ref 20–32)
COLOR UR: NORMAL
CREAT SERPL-MCNC: 0.98 MG/DL (ref 0.7–1.35)
EGFR: 83 ML/MIN/1.73M2
EOSINOPHIL # BLD AUTO: 133 CELLS/UL (ref 15–500)
EOSINOPHIL NFR BLD AUTO: 2.6 %
ERYTHROCYTE [DISTWIDTH] IN BLOOD BY AUTOMATED COUNT: 14.1 % (ref 11–15)
GLOBULIN SER CALC-MCNC: 2.3 G/DL (CALC) (ref 1.9–3.7)
GLUCOSE SERPL-MCNC: 107 MG/DL (ref 65–99)
GLUCOSE UR QL STRIP: NEGATIVE
HCT VFR BLD AUTO: 47.9 % (ref 38.5–50)
HDLC SERPL-MCNC: 28 MG/DL
HGB BLD-MCNC: 15.6 G/DL (ref 13.2–17.1)
HGB UR QL STRIP: NEGATIVE
KETONES UR QL STRIP: NEGATIVE
LDLC SERPL CALC-MCNC: 71 MG/DL (CALC)
LEUKOCYTE ESTERASE UR QL STRIP: NEGATIVE
LYMPHOCYTES # BLD AUTO: 898 CELLS/UL (ref 850–3900)
LYMPHOCYTES NFR BLD AUTO: 17.6 %
MCH RBC QN AUTO: 29 PG (ref 27–33)
MCHC RBC AUTO-ENTMCNC: 32.6 G/DL (ref 32–36)
MCV RBC AUTO: 89 FL (ref 80–100)
MONOCYTES # BLD AUTO: 444 CELLS/UL (ref 200–950)
MONOCYTES NFR BLD AUTO: 8.7 %
NEUTROPHILS # BLD AUTO: 3585 CELLS/UL (ref 1500–7800)
NEUTROPHILS NFR BLD AUTO: 70.3 %
NITRITE UR QL STRIP: NEGATIVE
NONHDLC SERPL-MCNC: 87 MG/DL (CALC)
PH UR STRIP: 5.5 [PH] (ref 5–8)
PLATELET # BLD AUTO: 172 THOUSAND/UL (ref 140–400)
PMV BLD REES-ECKER: 10.8 FL (ref 7.5–12.5)
POTASSIUM SERPL-SCNC: 4 MMOL/L (ref 3.5–5.3)
PROT SERPL-MCNC: 6.6 G/DL (ref 6.1–8.1)
PROT UR QL STRIP: NEGATIVE
RBC # BLD AUTO: 5.38 MILLION/UL (ref 4.2–5.8)
SODIUM SERPL-SCNC: 138 MMOL/L (ref 135–146)
SP GR UR STRIP: 1.02 (ref 1–1.03)
T4 FREE SERPL-MCNC: 1 NG/DL (ref 0.8–1.8)
TRIGL SERPL-MCNC: 75 MG/DL
TSH SERPL-ACNC: 3.07 MIU/L (ref 0.4–4.5)
WBC # BLD AUTO: 5.1 THOUSAND/UL (ref 3.8–10.8)

## 2024-12-17 DIAGNOSIS — N52.1 ERECTILE DYSFUNCTION DUE TO DISEASES CLASSIFIED ELSEWHERE: ICD-10-CM

## 2024-12-17 NOTE — TELEPHONE ENCOUNTER
----- Message from Anita sent at 12/17/2024 12:51 PM CST -----  Contact: eri  Type:  Needs Medical Advice    Who Called: eri lee       Would the patient rather a call back or a response via [facility]? Call back     Best Call Back Number: [phone]    Additional Information: pharmacy states the paper for his refill request, was not dated, and the correct boxes where not checked off. Can office call it in.   Please call back to advise. Thanks!

## 2024-12-18 RX ORDER — TADALAFIL 5 MG/1
5 TABLET ORAL DAILY
Qty: 90 TABLET | Refills: 3 | Status: SHIPPED | OUTPATIENT
Start: 2024-12-18 | End: 2025-12-18

## 2024-12-23 ENCOUNTER — OFFICE VISIT (OUTPATIENT)
Dept: FAMILY MEDICINE | Facility: CLINIC | Age: 69
End: 2024-12-23
Payer: MEDICARE

## 2024-12-23 VITALS
SYSTOLIC BLOOD PRESSURE: 110 MMHG | DIASTOLIC BLOOD PRESSURE: 60 MMHG | HEART RATE: 73 BPM | WEIGHT: 288 LBS | HEIGHT: 69 IN | BODY MASS INDEX: 42.65 KG/M2 | OXYGEN SATURATION: 97 % | TEMPERATURE: 98 F

## 2024-12-23 DIAGNOSIS — E55.9 VITAMIN D DEFICIENCY: ICD-10-CM

## 2024-12-23 DIAGNOSIS — E03.9 ACQUIRED HYPOTHYROIDISM: Primary | ICD-10-CM

## 2024-12-23 DIAGNOSIS — I10 ESSENTIAL (PRIMARY) HYPERTENSION: ICD-10-CM

## 2024-12-23 DIAGNOSIS — E78.5 DYSLIPIDEMIA: ICD-10-CM

## 2024-12-23 DIAGNOSIS — E55.9 VITAMIN D INSUFFICIENCY: ICD-10-CM

## 2024-12-23 DIAGNOSIS — Z13.1 DIABETES MELLITUS SCREENING: ICD-10-CM

## 2024-12-23 DIAGNOSIS — R73.01 ELEVATED FASTING GLUCOSE: ICD-10-CM

## 2024-12-23 PROCEDURE — 4010F ACE/ARB THERAPY RXD/TAKEN: CPT | Mod: HCNC,CPTII,S$GLB, | Performed by: NURSE PRACTITIONER

## 2024-12-23 PROCEDURE — 99214 OFFICE O/P EST MOD 30 MIN: CPT | Mod: HCNC,S$GLB,, | Performed by: NURSE PRACTITIONER

## 2024-12-23 PROCEDURE — 1160F RVW MEDS BY RX/DR IN RCRD: CPT | Mod: HCNC,CPTII,S$GLB, | Performed by: NURSE PRACTITIONER

## 2024-12-23 PROCEDURE — 1159F MED LIST DOCD IN RCRD: CPT | Mod: HCNC,CPTII,S$GLB, | Performed by: NURSE PRACTITIONER

## 2024-12-23 PROCEDURE — 1101F PT FALLS ASSESS-DOCD LE1/YR: CPT | Mod: HCNC,CPTII,S$GLB, | Performed by: NURSE PRACTITIONER

## 2024-12-23 PROCEDURE — 3008F BODY MASS INDEX DOCD: CPT | Mod: HCNC,CPTII,S$GLB, | Performed by: NURSE PRACTITIONER

## 2024-12-23 PROCEDURE — 3061F NEG MICROALBUMINURIA REV: CPT | Mod: HCNC,CPTII,S$GLB, | Performed by: NURSE PRACTITIONER

## 2024-12-23 PROCEDURE — 3044F HG A1C LEVEL LT 7.0%: CPT | Mod: HCNC,CPTII,S$GLB, | Performed by: NURSE PRACTITIONER

## 2024-12-23 PROCEDURE — 3066F NEPHROPATHY DOC TX: CPT | Mod: HCNC,CPTII,S$GLB, | Performed by: NURSE PRACTITIONER

## 2024-12-23 PROCEDURE — 3074F SYST BP LT 130 MM HG: CPT | Mod: HCNC,CPTII,S$GLB, | Performed by: NURSE PRACTITIONER

## 2024-12-23 PROCEDURE — 99999 PR PBB SHADOW E&M-EST. PATIENT-LVL III: CPT | Mod: PBBFAC,HCNC,, | Performed by: NURSE PRACTITIONER

## 2024-12-23 PROCEDURE — 3078F DIAST BP <80 MM HG: CPT | Mod: HCNC,CPTII,S$GLB, | Performed by: NURSE PRACTITIONER

## 2024-12-23 PROCEDURE — 3288F FALL RISK ASSESSMENT DOCD: CPT | Mod: HCNC,CPTII,S$GLB, | Performed by: NURSE PRACTITIONER

## 2024-12-23 NOTE — PROGRESS NOTES
Subjective:       Patient ID: Marcus Parisi is a 69 y.o. male.    Chief Complaint: Diabetes, Thyroid Problem, and Follow-up    History of Present Illness    CHIEF COMPLAINT:  Patient presents for a follow-up visit to discuss recent lab results.    HPI:  Patient reports recent efforts to improve his health, including dietary changes and increased physical activity. He engages in inclined walking for 30 to 45 minutes nightly, with profuse sweating occurring 20-25 minutes into the exercise. Patient has modified his eating habits, consuming meals around 18:00 and incorporating vegetable-only meals 2-3 nights weekly. He notes increased satiety with fewer calories when consuming vegetables. For snacks, he consumes nature bars and cashews. Patient observes improved balance as a result of these lifestyle changes. He avoids the kitchen area during the day to prevent unnecessary eating. Patient reports a weight loss of approximately 15 lbs since his last visit. He denies using butter or olive oil on his vegetables and does not consume milk regularly.    MEDICATIONS:  Patient is on Ozempic for weight loss and blood sugar control.    MEDICAL HISTORY:  Patient has a history of pre-diabetes.    TEST RESULTS:  Patient recently underwent labs, with favorable results. His PSA test in September was within normal limits. A recent metabolic panel showed a slightly elevated blood sugar of 107, consistent with his pre-diabetes. His HDL cholesterol level is 28. Recent TSH and Free T4 tests were within normal limits. A recent urinalysis was clear and negative for blood.      ROS:  General: -fever, -chills, -fatigue, -weight gain, +weight loss  Eyes: -vision changes, -redness, -discharge  ENT: -ear pain, -nasal congestion, -sore throat  Cardiovascular: -chest pain, -palpitations, -lower extremity edema  Respiratory: -cough, -shortness of breath  Gastrointestinal: -abdominal pain, -nausea, -vomiting, -diarrhea, -constipation, -blood in  stool  Genitourinary: -dysuria, -hematuria, -frequency  Musculoskeletal: -joint pain, -muscle pain  Skin: -rash, -lesion  Neurological: -headache, -dizziness, -numbness, -tingling  Psychiatric: -anxiety, -depression, -sleep difficulty         Past Medical History:   Diagnosis Date    A-fib     Allergy     Anticoagulant long-term use     Arthritis     Asthma     as a child    Hyperlipidemia     Hypertension     Personal history of colonic polyps 03/22/2024    Prostate cancer 2019    Sleep apnea     Use CPAP    Sleep apnea     uses C-PAP        Past Surgical History:   Procedure Laterality Date    CARPAL TUNNEL RELEASE Bilateral     COLONOSCOPY  03/22/2024    3 YR RECALL    CYSTOSCOPY N/A 01/28/2019    Procedure: CYSTOSCOPY;  Surgeon: Delmy Hernandez MD;  Location: [facility] OR;  Service: Urology;  Laterality: N/A;    CYSTOSCOPY N/A 07/10/2023    Procedure: CYSTOSCOPY;  Surgeon: Delmy Hernandez MD;  Location: [facility] OR;  Service: Urology;  Laterality: N/A;    CYSTOURETEROSCOPY WITH RETROGRADE PYELOGRAPHY AND INSERTION OF STENT INTO URETER Left 11/10/2021    Procedure: CYSTOURETEROSCOPY, WITH RETROGRADE PYELOGRAM AND URETERAL STENT INSERTION;  Surgeon: Delmy Hernandez MD;  Location: [facility] OR;  Service: Urology;  Laterality: Left;  please make 1st patient    INSERTION OF IMPLANTABLE LOOP RECORDER N/A 04/23/2021    Procedure: Insertion, Implantable Loop Recorder;  Surgeon: Almaz Rivas MD;  Location: [facility] [place] CATH;  Service: Cardiology;  Laterality: N/A;    ROBOT-ASSISTED LAPAROSCOPIC PROSTATECTOMY USING DA LAVERN XI N/A 07/30/2020    Procedure: XI ROBOTIC PROSTATECTOMY;  Surgeon: Mejia Gold MD;  Location: [facility] [place] OR;  Service: Urology;  Laterality: N/A;    TONSILLECTOMY      TRANSRECTAL BIOPSY OF PROSTATE WITH ULTRASOUND GUIDANCE N/A 01/28/2019    Procedure: BIOPSY, PROSTATE, RECTAL APPROACH, WITH US GUIDANCE;  Surgeon: Delmy Hernandez MD;  Location: [facility] OR;  Service: Urology;   Laterality: N/A;       Family History   Problem Relation Name Age of Onset    Cancer Mother      Asthma Mother      Diabetes Mother      Heart disease Mother      Hypertension Mother      Hypertension Father      Crohn's disease Brother      Cancer Maternal Grandmother         Social History     Socioeconomic History    Marital status:    Tobacco Use    Smoking status: Former     Current packs/day: 0.00     Average packs/day: 3.0 packs/day for 5.0 years (15.0 ttl pk-yrs)     Types: Cigarettes     Start date:      Quit date:      Years since quittin.0    Smokeless tobacco: Former     Types: Chew    Tobacco comments:     quit 40 yrs ago   Substance and Sexual Activity    Alcohol use: Not Currently     Comment: occasional    Drug use: No    Sexual activity: Yes     Partners: Female     Social Drivers of Health     Financial Resource Strain: Low Risk  (6/3/2024)    Overall Financial Resource Strain (CARDIA)     Difficulty of Paying Living Expenses: Not hard at all   Food Insecurity: No Food Insecurity (6/3/2024)    Hunger Vital Sign     Worried About Running Out of Food in the Last Year: Never true     Ran Out of Food in the Last Year: Never true   Transportation Needs: No Transportation Needs (2020)    PRAPARE - Transportation     Lack of Transportation (Medical): No     Lack of Transportation (Non-Medical): No   Physical Activity: Inactive (6/3/2024)    Exercise Vital Sign     Days of Exercise per Week: 0 days     Minutes of Exercise per Session: 0 min   Stress: No Stress Concern Present (6/3/2024)    [employer] - Occupational Stress Questionnaire     Feeling of Stress : Not at all   Housing Stability: Unknown (6/3/2024)    Housing Stability Vital Sign     Unable to Pay for Housing in the Last Year: No       Current Outpatient Medications   Medication Sig Dispense Refill    amlodipine-benazepril 5-20 mg (LOTREL) 5-20 mg per capsule TAKE 1 CAPSULE EVERY DAY 90  "capsule 3    apixaban (ELIQUIS) 5 mg Tab Take 1 tablet (5 mg total) by mouth 2 (two) times daily. 180 tablet 1    atorvastatin (LIPITOR) 20 MG tablet TAKE 1 TABLET EVERY EVENING 90 tablet 3    cyanocobalamin (VITAMIN B-12) 1000 MCG tablet Take 1,000 mcg by mouth once daily.       levothyroxine (SYNTHROID) 50 MCG tablet TAKE 1 TABLET BY MOUTH BEFORE BREAKFAST. 90 tablet 3    metoprolol succinate (TOPROL-XL) 25 MG 24 hr tablet Take 1 tablet (25 mg total) by mouth every evening. 90 tablet 3    metoprolol succinate (TOPROL-XL) 50 MG 24 hr tablet TAKE 1 TABLET (50 MG TOTAL) BY MOUTH EVERY MORNING. 90 tablet 1    OMEGA-3 ACID ETHYL ESTERS ORAL Take 1 capsule by mouth once daily.       tadalafiL (CIALIS) 5 MG tablet Take 1 tablet (5 mg total) by mouth once daily. Take one every morning 90 tablet 3     No current facility-administered medications for this visit.       Review of patient's allergies indicates:   Allergen Reactions    Adhesive      Objective:      Blood pressure 110/60, pulse 73, temperature 97.9 °F (36.6 °C), height 5' 9" (1.753 m), weight 130.6 kg (288 lb), SpO2 97%. Body mass index is 42.53 kg/m².   Physical Exam    General: No acute distress. Well-developed. Well-nourished.  Eyes: EOMI. Sclerae anicteric.  HENT: Normocephalic. Atraumatic. Nares patent. Moist oral mucosa.  Ears: Bilateral TMs clear. Bilateral EACs clear.  Cardiovascular: Regular rate. Regular rhythm. No murmurs. No rubs. No gallops. Normal S1, S2.  Respiratory: Normal respiratory effort. Clear to auscultation bilaterally. No rales. No rhonchi. No wheezing.  Abdomen: Soft. Non-tender. Non-distended. Normoactive bowel sounds.  Musculoskeletal: No  obvious deformity.  Extremities: No lower extremity edema.  Neurological: Alert & oriented x3. No slurred speech. Normal gait.  Psychiatric: Normal mood. Normal affect. Good insight. Good judgment.  Skin: Warm. Dry. No rash.         Assessment:       Assessment & Plan    - Reviewed patient's labs " results  - Noted blood sugar slightly elevated at 107, still in pre-diabetes range (goal <99)  - Assessed cholesterol levels, with HDL at 28  - Evaluated TSH and free T4, both within normal range  - Urine test results clear, no blood detected  - Continued current medication regimen without changes    ABNORMAL GLUCOSE:  - Explained importance of protein intake on vegetable-heavy diet days.  - Patient to ensure protein intake on vegetable-heavy diet days (e.g., small amount of meat or protein shake).    LIPOPROTEIN DEFICIENCY:  - Educated on role of olive oil in improving good cholesterol (HDL) levels.  - Patient to add small amount of olive oil to vegetables for HDL improvement.    EXERCISE COUNSELING:  - Discussed benefits of sweating during exercise for overall health.  - Patient to continue current exercise routine of 30-45 minutes walking on incline.  - Recommend incorporating rest days into exercise schedule, such as 3 days on, 1 day off.    FOLLOW-UP AND TESTING:  - CBC, CMP, lipid panel, and thyroid level ordered in 6 months at [facility].  - Follow up in 6 months.  - Continue to work with [facility] for medication refills as needed.       Plan:       Marcus was seen today for diabetes, thyroid problem and follow-up.    Diagnoses and all orders for this visit:    Acquired hypothyroidism  -     T4, Free; Future  -     TSH; Future  -     T4, Free  -     TSH    Vitamin D insufficiency  Stable  Continue current dose    Essential (primary) hypertension  -     Urinalysis; Future  -     Comprehensive Metabolic Panel; Future  -     CBC Auto Differential; Future  -     Microalbumin/Creatinine Ratio, Urine; Future  -     Urinalysis  -     Comprehensive Metabolic Panel  -     CBC Auto Differential  -     Microalbumin/Creatinine Ratio, Urine    Dyslipidemia  -     Lipid Panel; Future  -     Lipid Panel  Limit red meat, butter, fried foods, cheese, and other foods that have a lot of saturated fat. Consume  more: lean meats, fish, fruits, vegetables, whole grains, beans, lentils, and nuts.  Weight loss, and 30-45 min of cardiovascular exercise daily.      Diabetes mellitus screening  -     Hemoglobin A1C; Future  -     Hemoglobin A1C    Vitamin D deficiency  Stable    Elevated fasting glucose  -     Comprehensive Metabolic Panel; Future  -     Hemoglobin A1C; Future  -     Comprehensive Metabolic Panel  -     Hemoglobin A1C           This note was generated with the assistance of ambient listening technology. Verbal consent was obtained by the patient and accompanying visitor(s) for the recording of patient appointment to facilitate this note. I attest to having reviewed and edited the generated note for accuracy, though some syntax or spelling errors may persist. Please contact the author of this note for any clarification.    I spent a total of 30 minutes on the day of the visit.  This includes face to face time and non-face to face time preparing to see the patient (eg, review of tests), obtaining and/or reviewing separately obtained history, documenting clinical information in the electronic or other health record, independently interpreting results and communicating results to the patient/family/caregiver, or care coordinator.

## 2025-01-30 DIAGNOSIS — Z00.00 ENCOUNTER FOR MEDICARE ANNUAL WELLNESS EXAM: ICD-10-CM

## 2025-03-20 ENCOUNTER — LAB VISIT (OUTPATIENT)
Dept: LAB | Facility: HOSPITAL | Age: 70
End: 2025-03-20
Attending: UROLOGY
Payer: MEDICARE

## 2025-03-20 DIAGNOSIS — C61 PROSTATE CANCER: ICD-10-CM

## 2025-03-20 LAB — COMPLEXED PSA SERPL-MCNC: 0.21 NG/ML (ref 0–4)

## 2025-03-20 PROCEDURE — 36415 COLL VENOUS BLD VENIPUNCTURE: CPT | Performed by: UROLOGY

## 2025-03-20 PROCEDURE — 84153 ASSAY OF PSA TOTAL: CPT | Performed by: UROLOGY

## 2025-03-23 ENCOUNTER — RESULTS FOLLOW-UP (OUTPATIENT)
Dept: UROLOGY | Facility: CLINIC | Age: 70
End: 2025-03-23

## 2025-03-24 NOTE — PROGRESS NOTES
Psma slowly rising. Now up to 0.21   Will order psma scan when I see him 3/27 to eval for metastatic/recurrent dz

## 2025-03-27 ENCOUNTER — PATIENT MESSAGE (OUTPATIENT)
Dept: UROLOGY | Facility: CLINIC | Age: 70
End: 2025-03-27

## 2025-03-27 ENCOUNTER — OFFICE VISIT (OUTPATIENT)
Dept: UROLOGY | Facility: CLINIC | Age: 70
End: 2025-03-27
Payer: MEDICARE

## 2025-03-27 VITALS — WEIGHT: 287.94 LBS | BODY MASS INDEX: 42.65 KG/M2 | HEIGHT: 69 IN

## 2025-03-27 DIAGNOSIS — N52.1 ERECTILE DYSFUNCTION DUE TO DISEASES CLASSIFIED ELSEWHERE: ICD-10-CM

## 2025-03-27 DIAGNOSIS — C61 PROSTATE CANCER: Primary | ICD-10-CM

## 2025-03-27 DIAGNOSIS — R31.29 MICROHEMATURIA: ICD-10-CM

## 2025-03-27 LAB
AMORPH CRY UR QL COMP ASSIST: ABNORMAL
BILIRUBIN, UA POC OHS: NEGATIVE
BLOOD, UA POC OHS: ABNORMAL
CLARITY, UA POC OHS: CLEAR
COLOR, UA POC OHS: YELLOW
GLUCOSE, UA POC OHS: NEGATIVE
KETONES, UA POC OHS: NEGATIVE
LEUKOCYTES, UA POC OHS: NEGATIVE
MICROSCOPIC COMMENT: ABNORMAL
NITRITE, UA POC OHS: NEGATIVE
PH, UA POC OHS: 5.5
PROTEIN, UA POC OHS: NEGATIVE
SPECIFIC GRAVITY, UA POC OHS: 1.02
UROBILINOGEN, UA POC OHS: 0.2

## 2025-03-27 PROCEDURE — 88112 CYTOPATH CELL ENHANCE TECH: CPT | Mod: 26,,, | Performed by: PATHOLOGY

## 2025-03-27 PROCEDURE — 3288F FALL RISK ASSESSMENT DOCD: CPT | Mod: CPTII,S$GLB,, | Performed by: UROLOGY

## 2025-03-27 PROCEDURE — 3008F BODY MASS INDEX DOCD: CPT | Mod: CPTII,S$GLB,, | Performed by: UROLOGY

## 2025-03-27 PROCEDURE — G2211 COMPLEX E/M VISIT ADD ON: HCPCS | Mod: S$GLB,,, | Performed by: UROLOGY

## 2025-03-27 PROCEDURE — 99214 OFFICE O/P EST MOD 30 MIN: CPT | Mod: S$GLB,,, | Performed by: UROLOGY

## 2025-03-27 PROCEDURE — 4010F ACE/ARB THERAPY RXD/TAKEN: CPT | Mod: CPTII,S$GLB,, | Performed by: UROLOGY

## 2025-03-27 PROCEDURE — 88112 CYTOPATH CELL ENHANCE TECH: CPT | Mod: TC | Performed by: UROLOGY

## 2025-03-27 PROCEDURE — 1159F MED LIST DOCD IN RCRD: CPT | Mod: CPTII,S$GLB,, | Performed by: UROLOGY

## 2025-03-27 PROCEDURE — 81001 URINALYSIS AUTO W/SCOPE: CPT | Performed by: UROLOGY

## 2025-03-27 PROCEDURE — 1160F RVW MEDS BY RX/DR IN RCRD: CPT | Mod: CPTII,S$GLB,, | Performed by: UROLOGY

## 2025-03-27 PROCEDURE — 1101F PT FALLS ASSESS-DOCD LE1/YR: CPT | Mod: CPTII,S$GLB,, | Performed by: UROLOGY

## 2025-03-27 PROCEDURE — 99999 PR PBB SHADOW E&M-EST. PATIENT-LVL III: CPT | Mod: PBBFAC,,, | Performed by: UROLOGY

## 2025-03-27 PROCEDURE — 1126F AMNT PAIN NOTED NONE PRSNT: CPT | Mod: CPTII,S$GLB,, | Performed by: UROLOGY

## 2025-03-27 PROCEDURE — 81003 URINALYSIS AUTO W/O SCOPE: CPT | Mod: QW,S$GLB,, | Performed by: UROLOGY

## 2025-03-27 NOTE — PATIENT INSTRUCTIONS
1. Prostate cancer    2. Microhematuria    3. Erectile dysfunction due to diseases classified elsewhere           Plan:     's typed/written- Abbreviated/Short Plan:  ED- continue tadalafil 5mg daily and 20mg prn   MH- mod bld today. H/o stones in R kd. . On eliquis. Send urine for ua neil and cytology. If >3 rbcs or abnormal urine cytology may need another ctu to eval for any obstructing stones or filling defects and cystoscopy (last one 1/28/19) to confirm no bladder tumors.  Rising psa s/p RalP 7/31/20- PSMA scan. If + fu for telephone visit. Will start ADT. If neg then psa in 3 months and fu after. (If psa continuing to rise will have to decide on further eval plan)  Fu in 3 months with diagnostic psa prior       Short plan:  Send urine today for ua niel and cytology  Schedule psma scan   Fu in 3 months with diagnostic psa prior       The following assessment plan was created by [facility] via ambient listening:  Assessment & Plan    C61 Prostate cancer  R31.29 Microhematuria  N52.1 Erectile dysfunction due to diseases classified elsewhere    IMPRESSION:   Noted moderate blood in urine today, despite patient denying visible hematuria.   History of kidney stones and prostate cancer increases concern for recurrence or new pathology.   Rising PSA (0.21) concerning for prostate cancer recurrence, especially given history of positive margins.   If PSMA scan positive, consider radiation for localized recurrence or androgen deprivation therapy (ADT) for metastatic disease.   Occupation as  increases risk for bladder cancer, warranting urine cytology.   If urine shows >3 RBCs, may need CT urogram to evaluate for stones or filling defects.   Consider cystoscopy to rule out bladder tumors if other tests inconclusive.    Please review the short plan as above for concise and accurate plan. The dictated/AI generated plan may have some inaccuracies .    PLAN SUMMARY:   Ordered PSMA scan to  evaluate for prostate cancer recurrence   Ordered urinalysis and urine cytology to assess hematuria   Continued tadalafil 5 mg daily and 20 mg as needed for erectile dysfunction   May order CT renal stone study if urinalysis shows >3 RBCs    PROSTATE CANCER:   Explained PSMA scan purpose: to look for prostate cancer recurrence or metastasis.   Ordered PSMA scan to evaluate for prostate cancer recurrence.    MICROHEMATURIA:   Ordered urinalysis and urine cytology to evaluate hematuria.   May order CT renal stone study if urinalysis shows >3 RBCs.    ERECTILE DYSFUNCTION DUE TO DISEASES CLASSIFIED ELSEWHERE:   Continued tadalafil 5 mg daily and 20 mg as needed for erectile dysfunction.             Portions of this note was generated with the assistance of ambient listening technology. Verbal consent was obtained by the patient and accompanying visitor(s) for the recording of patient appointment to facilitate this note. I attest to having reviewed and edited the generated note for accuracy, though some syntax or spelling errors may persist. Please contact the author of this note for any clarification.

## 2025-03-27 NOTE — PROGRESS NOTES
[facility], formerly known as [facility]  Group MD's:Mary/Fanny/Micha/Leslie/Jyotsna  Group NP's: Francoise Rubin, Briseida Zapata    PCP: Lois Arevalo FNP-C  Date of Service: 03/27/2025  Today's note written by: MD Mary       Chief Complaint: prostate cancer     Subjective:        HPI: Marcus Parisi is a 64 y.o. male presents with  Prostate cancer, T2c Gl3+3, T2c psa 2.9, NxMx prostate nodules, B apex and MH.  He was initially seen by me in 2018 for rising psa.   He was found to have Gl 3+3 in 4/14 cores Jan 2019 and was referred due to insurance issues to .    took him for a RALP on 7/1/20. Gl 4+3 with teriary 5 in <5%. Tumor involves 20 to 25% of prostate. Right apical and Left posterior margins involved by cancer.  Also had a h/o stones 10+ years ago. Had a stent but leaked continuously   9/11/21 psa at [facility] <0.1  10/27/21: Saw francoise on 10/27/21 and was c/o gross hematuria. CTU showed mid to distal L ureteral 3mm stone. Pt wanted it removed.     Interval history 11/3/21  He is here in preop and says he hasn't had any hematuria in 3 to 4 days. He's had no flank pain in 3 weeks. He never received flomax ([facility]). He also has not passed stone.  Prostate cancer- still has some incontinence  He was discharged on flomax with a plan for a repeat ctrss     Interval history 11/10/21:  ctrss 11/8/21 showed L ureteral 4mm stone in same position with moderate hydro, no left renal stones. 2, 2mm stones in right kidney. Long bladder . Therefore here today for cystoscopy and ureteroscopy to extract stone as long as no infection beyond stone   psa is 0.03 (no previous super sensitive psa's to go by)  Still with LUIS ENRIQUE        Interval history 2/8/22:  11/10/21 cysto L urs and stone extraction. Stone was not radioopaque. Inflammation around stone. Found to have BNC. Left stent on strings. Stones mostly calcium oxalate.   1/25/22 psa up to  0.04  rbus 2/3/22: 8mm stone on L wo hydro. No stone on right  kub 2/3/22: no stones seen   Hasn't passed any stones. Didn't notice much diffference after procedure  (no increased flow) and denies any slow flow. Denies any frequency or ugency. Still has LUIS ENRIQUE.     Interval history since last visit with me:  Uroflow results (date: 02/16/2022): Voiding time: 21.1s, Flow time: 17.2s, TTP flow: 9.3s, Peak flowrate: 24.1 mL/s, Average flowrate: 13.5mL/s, Intervals: 2, Voided volume: 233 mL, Pvr by bladder scan: 0mL . Pattern of curve: see uploaded image, reviewed by     5/17/122 0.04    5/24/22: He apparently is using cialis 20mg daily. Should not be doing this daily. Not interested in LAURA. Not a good candidate for inection. On eliquis.     Interval history 11/29/22:  Had a psa done on 11/22/22 and was 0.05. still urinating well with no issues.   Using cialis as needed now, not daily. Doing well on this.   Ua today with tr bld but no uti sx. No flank pain.     Interval history 3/6/23:  He returns today with a repeat PSA done on 02/28/2023, up to 0.07 now.  He also had a kidney ultrasound on 02/28/2023 showing one  6 mm stone in each kidney, KUB (Xray of the abdomen to look for stones) 02/28/2023 showing no stones  Voided urine today shows trace blood.  Denies any flank pain or gross hematuria.  No back pain.  Stays active.on cialis as needed.   History of bladder neck contracture but denies any difficulty urinating.  Good flow.  He says that he has been having stress incontinence since his prostatectomy however his nighttime incontinence has improved significantly.  Incontinence most noticeable with strenuous activity.  Wears 1 depends or liner during the day.  nighttime depends now dry. Only urinates 3x during the day. Does do kegels.         Interval history 6/20/23:  Here today to discuss his PSA.  We have been monitoring it since it is slowly rising.  PSA up to 0.09.  0.02 higher than 3 months ago.  Has not had any  back pain.  Also of note his urine today shows large blood.  Last time it only showed trace blood.  He denies any gross hematuria.  He is on Eliquis.  Denies any flank pain.  Plan was to get an x-ray in February.    Interval history 7/10/23:  6/20/23 ua showed large blood, 20 rbc- scheduled him for ctu and cysto.  6/27/23 called c/o uti sx. Urine sent for u/a, urine culture and urine cytology.   6/27/23 cytology neg  Ucx 6/27/23: e.coli. ua with 3+leuk/nit+/3+bld, ua neil - 20 rbc/>100 wbc/mod bact, tx with cipro bid x 10d. Finished a few days ago.   Had another psa on 7/1/23 and now up to 0.14  Ctu 7/1/23 for uti.   1. Cholelithiasis.- gallstones, see pcp or GI if having pain in right upper quadrant, nausea or vomiting  2. Splenomegaly.- large spleen, he can see pcp for this  3. Small nonobstructing right renal calculi.- tiny stones on right can cause blood in urine  4. Small subcentimeter renal cysts.- nothing to do for this      Cystoscopy on 7/10/23: no stricture and no tumors    Interval history by ME in CLINIC on 9/21/213 for prostate cancer fu and stone history:  Today he staesGood stream (does have a h/o bladder neck contracture). Pvr: 32  Independent Review of Ctu 7/1/23 again today:  shows 4 tiny stones on right, non obstructing. No ureteral cyst. Cystic lesion in the pelvis, stable. .  Small chronic stable 3.2 cm cystic lesion of the left paramedian pelvis at the level of the pubic symphysis again slightly displacing the bladder.            Psa 9/14/23 back down to 0.10 (could have been elevated due prostatitis.)  Ua today with tr bld    Interval history by ME/ in CLINIC on 3/21/24 for prostate cancer, kidney stones, ED:  Kidney stones- 4 tiny right stones. Denies any R flank pain. No GH. Ua today with r bld only.   BNC with slow flow:  at last visit told him he could stop the flomax if he wants to see if it made a difference but he said that he didn't want to discontinue bc he felt like it  had helped when he first started and doesn' mind taking.   ED: at last visit was on cialis 5mg daily and that's what my plan had said. He said that I had told him not to take it every day. On his med list he has 20mg. He said that he was taking cialis once daily up until a month ago and he ran out.  H/o Prostate cancer, Gl 4+3 with tertiary 5 in <5% s/p ralp in 7/2020 with + margins.  Recent psa rise likely due to prostatitis.  Psa ginny by 0.02 over 6 months. Previously was rising 0.02 every 3 months.     Interval history by ME/ in CLINIC on 9/26/24:  H/o Prostate cancer, Gl 4+3 with tertiary 5 in <5% s/p ralp in 7/2020 with + margins.    Recent PSA on 09/1924 up to 0.14.  Successive rises over the last year.  It was as high as 0.14 a year ago however.  Kidney stones- 4 tiny right stonesHe has not passed any stones in the last 6 months.  He did do an ultrasound on 9/19 and it showed bilateral 6 mm stones without hydro.  The x-ray did not clearly show any stones.  His voided urine today shows trace blood.  He denies any gross hematuria flank pain  Ed- At his last visit I started him on tadalafil 5 mg daily and 20 mg as needed.  He has been doing this regimen and feels like this works for him if he just uses the 5 mg daily does not have good response  I also discontinued his Flomax at his last visit because he no longer has prostate and he has not noticed any difference in his urinary symptoms in his PVR today is 0.  He is urinating without any issue with a good flow    Interval history by ME/ in CLINIC on 3/27/25:  History of Present Illness    CHIEF COMPLAINT:  Mr. Parisi presents for follow-up of prostate cancer, erectile dysfunction, and evaluation of blood in urine.    HPI:  Mr. Parisi has a history of prostate cancer status post resection in 2020 with positive margins. His PSA values have been rising, increasing from 0.12 to 0.14, and now to 0.21. He reports no pain or symptoms related to  his prostate cancer. He has erectile dysfunction, for which he takes tadalafil 5 mg daily and occasionally uses 20 mg as needed, approximately once or twice weekly, sometimes less frequently. He has a history of kidney stones, with 4 stones noted on his right kidney in a previous CT urogram from July 2023. He denies passing any stones or related pain. He also has a history of microhematuria, though he reports not seeing any visible blood in his urine. Today's urine test shows moderate blood in his urine, which is more than previously noted.    He denies seeing blood in his urine, flank pain, or passing any kidney stones. He denies any pain related to his prostate cancer or kidney stones.       My notes:  Prostate cancer  Microhematuria- ua today with mod blood. On eliquis. No GH.   Kd stones-no flank pain.   ED-on tadalafil 5mg daily through eri. Happy on this dose. Occ 20mg as needed.  Not required to use higher dose every time.     PERTINENT MEDICATIONS:  Tadalafil 5 mg, daily, for erectile dysfunction  Tadalafil 20 mg, as needed (PRN), 1-2 times per week, for erectile dysfunction    PERTINENT MEDICAL HISTORY:  Prostate cancer: Diagnosed prior to 2020, positive margins  Kidney stones: Right kidney  Erectile dysfunction  Microhematuria    PERTINENT SURGICAL HISTORY:  Prostate cancer resection: 2020, for prostate cancer treatment    PERTINENT TEST RESULTS:  PSA: December, 0.12  PSA: 0.14  PSA: Current visit, 0.21, noted as rising  Urinalysis: Current visit, moderate blood in urine  Urinalysis: December, no blood in urine CT Urogram: July 20th, 2023, showed tiny stones in right kidney  X-ray: September, did not show much  Ultrasound: September, did not show much       psa history   3/27/25 0.21  9/26/24 0.14  3/11/24  0.12  9/21/23 Pvr: 32  9/14/23 0.10  7/1/23  0.14  6/6/23  0.09  2/28/23 0.07  11/22/22 0.05  5/24/22  pvr by scan: 11  5/17/22 0.04   2/8/22  pvr by scan:  0  1/25/22 0.04  11/8/21 0.03  9/11/21 <0.1   7/31/20 T3a RALP Gl 4+3 with teriary 5 in <5%. Tumor involves 20 to 25% of prostate.   1/28/19  psa 2.9, trus vol: 40.5g, T2c (b palp nodules), Gl 3+3 in RBM, LML, LONDONO, LTZ (4/14).  HGPIN in  RBL, RBM (2/14). ASAP in SHELLY (1/14)  01/14/19          2.9, %free 15.5, JAHAIRA: 20g, definite nodule on left apex and one on right apex.   10/9/18            JAHAIRA: 20g gland without any discrete masses, left side however more firm, no tenderness.  9/18/18            2.1  9/2017             1.2      Urine history: eliquis for afib. No tobacco hx. + stones  3/27/25 Mod blood  12/11/24 neg  09/26/2024 trace blood.   9/21/23 Tr bld  7/10/23 Neg  6/27/23 E.coli, 3+bld/nit+/3+leuk, 20 rbc/>100 wbc/mod bacter  6/20/23 Large bld, 20 rbc/few bact  3/6/23  Tr bld  5/24/22 Tr bld  02/8/22 neg  11/10/21 90%calcium oxalate/10% calcium phosphate  10/27/21 Ng, void: red, 3+bld, >100 rbc, cyt: neg.   6/19/21 coreen  1/28/19 Ng, void: tr wbc- no sx of uti  1/21/19 ng, void: neg  1/15/19 No cx, void: tr blood, cytology: negative  10/9/18            No cx, void: neg       REVIEW OF SYSTEMS:neg  Objective:      There were no vitals filed for this visit.              Assessment:         Marcus Parisi is a 70 y.o. male with     1. Prostate cancer    2. Microhematuria    3. Erectile dysfunction due to diseases classified elsewhere           Plan:     's typed/written- Abbreviated/Short Plan:  ED- continue tadalafil 5mg daily and 20mg prn   MH- mod bld today. H/o stones in R kd. . On eliquis. Send urine for ua neil and cytology. If >3 rbcs or abnormal urine cytology may need another ctu to eval for any obstructing stones or filling defects and cystoscopy (last one 1/28/19) to confirm no bladder tumors.  Rising psa s/p RalP 7/31/20- PSMA scan. If + fu for telephone visit. Will start ADT. If neg then psa in 3 months and fu after. (If psa continuing to rise will have to decide on further eval  plan)  Fu in 3 months with diagnostic psa prior       Short plan:  Send urine today for ua neil and cytology  Schedule psma scan   Fu in 3 months with diagnostic psa prior       The following assessment plan was created by Arieso via ambient listening:  Assessment & Plan    C61 Prostate cancer  R31.29 Microhematuria  N52.1 Erectile dysfunction due to diseases classified elsewhere    IMPRESSION:   Noted moderate blood in urine today, despite patient denying visible hematuria.   History of kidney stones and prostate cancer increases concern for recurrence or new pathology.   Rising PSA (0.21) concerning for prostate cancer recurrence, especially given history of positive margins.   If PSMA scan positive, consider radiation for localized recurrence or androgen deprivation therapy (ADT) for metastatic disease.   Occupation as  increases risk for bladder cancer, warranting urine cytology.   If urine shows >3 RBCs, may need CT urogram to evaluate for stones or filling defects.   Consider cystoscopy to rule out bladder tumors if other tests inconclusive.    Please review the short plan as above for concise and accurate plan. The dictated/AI generated plan may have some inaccuracies .    PLAN SUMMARY:   Ordered PSMA scan to evaluate for prostate cancer recurrence   Ordered urinalysis and urine cytology to assess hematuria   Continued tadalafil 5 mg daily and 20 mg as needed for erectile dysfunction   May order CT renal stone study if urinalysis shows >3 RBCs    PROSTATE CANCER:   Explained PSMA scan purpose: to look for prostate cancer recurrence or metastasis.   Ordered PSMA scan to evaluate for prostate cancer recurrence.    MICROHEMATURIA:   Ordered urinalysis and urine cytology to evaluate hematuria.   May order CT renal stone study if urinalysis shows >3 RBCs.    ERECTILE DYSFUNCTION DUE TO DISEASES CLASSIFIED ELSEWHERE:   Continued tadalafil 5 mg daily and 20 mg as needed for erectile dysfunction.              Portions of this note was generated with the assistance of ambient listening technology. Verbal consent was obtained by the patient and accompanying visitor(s) for the recording of patient appointment to facilitate this note. I attest to having reviewed and edited the generated note for accuracy, though some syntax or spelling errors may persist. Please contact the author of this note for any clarification.    Visit today included increased complexity associated with the care of the episodic problem addressed and managing the longitudinal care of the patient due to the serious and/or complex managed problem(s)                         Generally speaking, PSMA PET Scans using PSMA (18F-[id number]) have been shown to be effective at PSA levels above 0.2ng/mL. Also generally speaking, the higher the PSA level (when restaging patients), the more likely a PSMA PET Scan is to detect and identify recurrent prostate cancer.  psa diagnostic in 6 months and f/u after (standing orders in).   But if psa rising, will order prostate mri to eval for any local recurrence at sites of positive margins.         PSA doubling time info:  https://www.mdcalc.com/calc/71848/psa-doubling-time-psadt-calculator#evidence  https://jamanetwork.com/journals/kristin/fullarticle/330601  The length of time after surgery prior to biochemical recurrence was important in determining the risk of eventual distant failure for men with lower (5, 6, and 7) and men with high (8, 9, and 10) Jud scores (Figure 4). A similar observation was made previously by Rahul et al22 in a report demonstrating that a high Maricarmen score and advanced pathologic stage were important in determining the likelihood of local or distant failure. Using a cutoff of 10 months, PSADT provided further substratification for men with a Maricarmen score of less than 8. Men with rapid PSA level elevation (<2 years), a Maricarmen score of 5 to 7, and a PSADT (>10 months) demonstrated a 76%  probability of remaining free of metastatic disease for 5 years following initial PSA level elevation compared with men with a shorter PSADT (<10 months) who had only 35% chance of remaining free of metastatic disease for 5 years after biochemical recurrence. Although not as strong as Maricarmen score, time of biochemical recurrence, and PSADT, the pathologic stage did contribute to the likelihood of distant metastasis (P=.01). The PSADT has been suggested by Kong et al13 as a useful predictor of the type of eventual recurrence after radical prostatectomy. They measured the PSADT for a group of 77 men with biochemical recurrence following radical retropubic prostatectomy and found that shorter PSADTs (<6 months) were more indicative of distant disease when compared with local recurrence.

## 2025-03-28 LAB
ESTROGEN SERPL-MCNC: NORMAL PG/ML
INSULIN SERPL-ACNC: NORMAL U[IU]/ML
LAB AP CLINICAL INFORMATION: NORMAL
LAB AP GROSS DESCRIPTION: NORMAL
LAB AP NON-GYN INTERPRETATION SPECIMEN 1: NORMAL
LAB AP PERFORMING LOCATION(S): NORMAL

## 2025-04-23 ENCOUNTER — HOSPITAL ENCOUNTER (OUTPATIENT)
Dept: RADIOLOGY | Facility: HOSPITAL | Age: 70
Discharge: HOME | End: 2025-04-23
Attending: UROLOGY
Payer: MEDICARE

## 2025-04-23 VITALS — HEIGHT: 69 IN | BODY MASS INDEX: 39.99 KG/M2 | WEIGHT: 270 LBS

## 2025-04-23 DIAGNOSIS — C61 PROSTATE CANCER: ICD-10-CM

## 2025-04-23 PROCEDURE — 78815 PET IMAGE W/CT SKULL-THIGH: CPT | Mod: TC,PO

## 2025-04-23 PROCEDURE — 78815 PET IMAGE W/CT SKULL-THIGH: CPT | Mod: 26,PI,, | Performed by: RADIOLOGY

## 2025-04-23 PROCEDURE — A9595 HC PIFLUFOLASTAT F-18, DX, PER 1 MCI: HCPCS | Mod: TB,PO | Performed by: UROLOGY

## 2025-04-23 RX ADMIN — PIFLUFOLASTAT F-18 9.5 MILLICURIE: 80 INJECTION INTRAVENOUS at 03:04

## 2025-04-29 ENCOUNTER — RESULTS FOLLOW-UP (OUTPATIENT)
Dept: UROLOGY | Facility: CLINIC | Age: 70
End: 2025-04-29

## 2025-04-29 DIAGNOSIS — C61 PROSTATE CANCER: Primary | ICD-10-CM

## 2025-04-29 NOTE — PROGRESS NOTES
Let him know psma scan inconclusive. Might be some recurrence at prostate bed and spine. The spine could also just be non-spcific and so they recommend an L spine to see if anything correlates to this finding.  Therefore we should get an L spine and he can fu after in a month for a telephone visit with a diagnostic psa prior. If the diagnostic psa higher will plan to start androgen deprivation therapy to stop T production but can discuss this at his fu    Plan:  Schedule L spine mri  Schedule 1 month fu with diagnostic psa prior- in person or VV

## 2025-04-30 ENCOUNTER — TELEPHONE (OUTPATIENT)
Dept: UROLOGY | Facility: CLINIC | Age: 70
End: 2025-04-30
Payer: MEDICARE

## 2025-04-30 DIAGNOSIS — C61 PROSTATE CANCER: Primary | ICD-10-CM

## 2025-04-30 NOTE — TELEPHONE ENCOUNTER
----- Message from Blanca sent at 4/30/2025 10:29 AM CDT -----  Regarding: Returning call  Type:  Patient Returning CallWho Called:  PtMarleeno Left Message for Patient:  SbBaldemar the patient know what this is regarding?:  Yes resultsBest Call Back Number:  [phone] Information:

## 2025-05-08 ENCOUNTER — HOSPITAL ENCOUNTER (OUTPATIENT)
Dept: RADIOLOGY | Facility: HOSPITAL | Age: 70
Discharge: HOME | End: 2025-05-08
Attending: UROLOGY
Payer: MEDICARE

## 2025-05-08 DIAGNOSIS — C61 PROSTATE CANCER: ICD-10-CM

## 2025-05-08 PROCEDURE — 72158 MRI LUMBAR SPINE W/O & W/DYE: CPT | Mod: TC

## 2025-05-08 PROCEDURE — 25500020 PHARM REV CODE 255: Performed by: UROLOGY

## 2025-05-08 PROCEDURE — 72158 MRI LUMBAR SPINE W/O & W/DYE: CPT | Mod: 26,,, | Performed by: RADIOLOGY

## 2025-05-08 PROCEDURE — A9585 GADOBUTROL INJECTION: HCPCS | Performed by: UROLOGY

## 2025-05-08 RX ORDER — GADOBUTROL 604.72 MG/ML
10 INJECTION INTRAVENOUS
Status: COMPLETED | OUTPATIENT
Start: 2025-05-08 | End: 2025-05-08

## 2025-05-08 RX ADMIN — GADOBUTROL 10 ML: 604.72 INJECTION INTRAVENOUS at 08:05

## 2025-05-20 ENCOUNTER — TELEPHONE (OUTPATIENT)
Dept: RADIATION ONCOLOGY | Facility: CLINIC | Age: 70
End: 2025-05-20

## 2025-05-20 ENCOUNTER — OFFICE VISIT (OUTPATIENT)
Dept: UROLOGY | Facility: CLINIC | Age: 70
End: 2025-05-20
Payer: MEDICARE

## 2025-05-20 ENCOUNTER — TELEPHONE (OUTPATIENT)
Dept: UROLOGY | Facility: CLINIC | Age: 70
End: 2025-05-20

## 2025-05-20 DIAGNOSIS — C61 PROSTATE CANCER: Primary | ICD-10-CM

## 2025-05-20 DIAGNOSIS — R31.29 MICROHEMATURIA: ICD-10-CM

## 2025-05-20 DIAGNOSIS — Z19.2 BIOCHEMICALLY RECURRENT CASTRATION-RESISTANT ADENOCARCINOMA OF PROSTATE: ICD-10-CM

## 2025-05-20 DIAGNOSIS — C61 BIOCHEMICALLY RECURRENT CASTRATION-RESISTANT ADENOCARCINOMA OF PROSTATE: ICD-10-CM

## 2025-05-20 DIAGNOSIS — R97.21 BIOCHEMICALLY RECURRENT CASTRATION-RESISTANT ADENOCARCINOMA OF PROSTATE: ICD-10-CM

## 2025-05-20 NOTE — PROGRESS NOTES
The patient location is: HOME  The state in which patient is residing at time of visit: [place]  Visit type: Virtual visit with AUDIO ONLY (TELEPHONE)  Total time spent in medical discussion: 15 minutes  Total time spent on date of the encounter: 30 minutes    The chief complaint leading to consultation is:   1. Prostate cancer  Ambulatory referral/consult to Radiation Oncology    Ambulatory referral/consult to Oncology      2. 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 Microhematuria            Date of Service: 05/20/2025  Delmy Hernandez MD    Each patient to whom he or she provides medical services by telemedicine is:    (1) informed of the relationship between the physician and patient and the respective role of any other health care provider with respect to management of the patient; and   (2) notified that he or she may decline to receive medical services by telemedicine and may withdraw from such care at any time.  (3) Patient verbally consented to treatment via phone, according to the clinic consent to treat policies outlined by the registrar    This service was not originating from a related E/M service provided within the previous 7 days nor will  to an E/M service or procedure within the next 24 hours or my soonest available appointment.  Prevailing standard of care was able to be met in this audio-only visit.        [facility], formerly known as [facility]  Group MD's:Mary/Fanny/Micha/Leslie/Jyotnsa Lezama NP's: Briseida Monroe    PCP: Lois Arevalo FNP-C  Date of Service: 05/20/2025  Today's note written by: MD Mary       Chief Complaint: prostate cancer     Subjective:        HPI: Marcus Parisi is a 64 y.o. male presents with  Prostate cancer, T2c Gl3+3, T2c psa 2.9, NxMx prostate nodules,  [place] and .  He was initially seen by me in 2018 for rising psa.   He was found to have Gl 3+3 in 4/14 cores Jan 2019 and was referred due to insurance issues to .    took him for a RALP on 7/1/20. Gl 4+3 with teriary 5 in <5%. Tumor involves 20 to 25% of prostate. Right apical and Left posterior margins involved by cancer.  Also had a h/o stones 10+ years ago. Had a stent but leaked continuously   9/11/21 psa at [facility] <0.1  10/27/21: Saw francoise on 10/27/21 and was c/o gross hematuria. CTU showed mid to distal L ureteral 3mm stone. Pt wanted it removed.     Interval history 11/3/21  He is here in preop and says he hasn't had any hematuria in 3 to 4 days. He's had no flank pain in 3 weeks. He never received flomax ([facility]). He also has not passed stone.  Prostate cancer- still has some incontinence  He was discharged on flomax with a plan for a repeat ctrss     Interval history 11/10/21:  ctrss 11/8/21 showed L ureteral 4mm stone in same position with moderate hydro, no left renal stones. 2, 2mm stones in right kidney. Long bladder . Therefore here today for cystoscopy and ureteroscopy to extract stone as long as no infection beyond stone   psa is 0.03 (no previous super sensitive psa's to go by)  Still with LUIS ENRIQUE        Interval history 2/8/22:  11/10/21 cysto L urs and stone extraction. Stone was not radioopaque. Inflammation around stone. Found to have BNC. Left stent on strings. Stones mostly calcium oxalate.   1/25/22 psa up to 0.04  rbus 2/3/22: 8mm stone on L wo hydro. No stone on right  kub 2/3/22: no stones seen   Hasn't passed any stones. Didn't notice much diffference after procedure  (no increased flow) and denies any slow flow. Denies any frequency or ugency. Still has LUIS ENRIQUE.     Interval history since last visit with me:  Uroflow results (date: 02/16/2022): Voiding time: 21.1s, Flow time: 17.2s, TTP flow: 9.3s, Peak flowrate: 24.1 mL/s, Average flowrate: 13.5mL/s,  Intervals: 2, Voided volume: 233 mL, Pvr by bladder scan: 0mL . Pattern of curve: see uploaded image, reviewed by     5/17/122 0.04    5/24/22: He apparently is using cialis 20mg daily. Should not be doing this daily. Not interested in LAURA. Not a good candidate for inection. On eliquis.     Interval history 11/29/22:  Had a psa done on 11/22/22 and was 0.05. still urinating well with no issues.   Using cialis as needed now, not daily. Doing well on this.   Ua today with tr bld but no uti sx. No flank pain.     Interval history 3/6/23:  He returns today with a repeat PSA done on 02/28/2023, up to 0.07 now.  He also had a kidney ultrasound on 02/28/2023 showing one  6 mm stone in each kidney, KUB (Xray of the abdomen to look for stones) 02/28/2023 showing no stones  Voided urine today shows trace blood.  Denies any flank pain or gross hematuria.  No back pain.  Stays active.on cialis as needed.   History of bladder neck contracture but denies any difficulty urinating.  Good flow.  He says that he has been having stress incontinence since his prostatectomy however his nighttime incontinence has improved significantly.  Incontinence most noticeable with strenuous activity.  Wears 1 depends or liner during the day.  nighttime depends now dry. Only urinates 3x during the day. Does do kegels.         Interval history 6/20/23:  Here today to discuss his PSA.  We have been monitoring it since it is slowly rising.  PSA up to 0.09.  0.02 higher than 3 months ago.  Has not had any back pain.  Also of note his urine today shows large blood.  Last time it only showed trace blood.  He denies any gross hematuria.  He is on Eliquis.  Denies any flank pain.  Plan was to get an x-ray in February.    Interval history 7/10/23:  6/20/23 ua showed large blood, 20 rbc- scheduled him for ctu and cysto.  6/27/23 called c/o uti sx. Urine sent for u/a, urine culture and urine cytology.   6/27/23 cytology neg  Ucx 6/27/23: e.coli. ua with  3+leuk/nit+/3+bld, ua neil - 20 rbc/>100 wbc/mod bact, tx with cipro bid x 10d. Finished a few days ago.   Had another psa on 7/1/23 and now up to 0.14  Ctu 7/1/23 for uti.   1. Cholelithiasis.- gallstones, see pcp or GI if having pain in right upper quadrant, nausea or vomiting  2. Splenomegaly.- large spleen, he can see pcp for this  3. Small nonobstructing right renal calculi.- tiny stones on right can cause blood in urine  4. Small subcentimeter renal cysts.- nothing to do for this      Cystoscopy on 7/10/23: no stricture and no tumors    Interval history by ME in CLINIC on 9/21/213 for prostate cancer fu and stone history:  Today he staesGood stream (does have a h/o bladder neck contracture). Pvr: 32  Independent Review of Ctu 7/1/23 again today:  shows 4 tiny stones on right, non obstructing. No ureteral cyst. Cystic lesion in the pelvis, stable. .  Small chronic stable 3.2 cm cystic lesion of the left paramedian pelvis at the level of the pubic symphysis again slightly displacing the bladder.            Psa 9/14/23 back down to 0.10 (could have been elevated due prostatitis.)  Ua today with tr bld    Interval history by ME/ in CLINIC on 3/21/24 for prostate cancer, kidney stones, ED:  Kidney stones- 4 tiny right stones. Denies any R flank pain. No GH. Ua today with r bld only.   BNC with slow flow:  at last visit told him he could stop the flomax if he wants to see if it made a difference but he said that he didn't want to discontinue bc he felt like it had helped when he first started and doesn' mind taking.   ED: at last visit was on cialis 5mg daily and that's what my plan had said. He said that I had told him not to take it every day. On his med list he has 20mg. He said that he was taking cialis once daily up until a month ago and he ran out.  H/o Prostate cancer, Gl 4+3 with tertiary 5 in <5% s/p ralp in 7/2020 with + margins.  Recent psa rise likely due to prostatitis.  Psa ginny by 0.02  over 6 months. Previously was rising 0.02 every 3 months.     Interval history by ME/ in CLINIC on 9/26/24:  H/o Prostate cancer, Gl 4+3 with tertiary 5 in <5% s/p ralp in 7/2020 with + margins.    Recent PSA on 09/1924 up to 0.14.  Successive rises over the last year.  It was as high as 0.14 a year ago however.  Kidney stones- 4 tiny right stonesHe has not passed any stones in the last 6 months.  He did do an ultrasound on 9/19 and it showed bilateral 6 mm stones without hydro.  The x-ray did not clearly show any stones.  His voided urine today shows trace blood.  He denies any gross hematuria flank pain  Ed- At his last visit I started him on tadalafil 5 mg daily and 20 mg as needed.  He has been doing this regimen and feels like this works for him if he just uses the 5 mg daily does not have good response  I also discontinued his Flomax at his last visit because he no longer has prostate and he has not noticed any difference in his urinary symptoms in his PVR today is 0.  He is urinating without any issue with a good flow    Interval history by ME/ in CLINIC on 3/27/25:  Mr. Parisi has a history of prostate cancer status post resection in 2020 with positive margins. His PSA values have been rising, increasing from 0.12 to 0.14, and now to 0.21. He reports no pain or symptoms related to his prostate cancer. He has erectile dysfunction, for which he takes tadalafil 5 mg daily and occasionally uses 20 mg as needed, approximately once or twice weekly, sometimes less frequently. He has a history of kidney stones, with 4 stones noted on his right kidney in a previous CT urogram from July 2023. He denies passing any stones or related pain. He also has a history of microhematuria, though he reports not seeing any visible blood in his urine. Today's urine test shows moderate blood in his urine, which is more than previously noted.  He denies seeing blood in his urine, flank pain, or passing any  kidney stones. He denies any pain related to his prostate cancer or kidney stones.         IInterval history by ME/ - CLINIC virtual visit (AUDIO ONLY) on 5/20/25:  History of Present Illness    CHIEF COMPLAINT:  Mr. Parisi presents to discuss rising PSA levels and follow up on recent imaging results related to his prostate cancer.    HPI:  Mr. Parisi's PSA levels have been rising, with the most recent value at 0.25. A PET scan showed a potential spot in his spine and radiotracer activity in the prostatectomy bed. A follow-up MRI showed no mass in the spine consistent with tumor metastasis. He has a history of prostate cancer for which he underwent a prostatectomy. His wife mentions that he has a history of atrial fibrillation (A-fib), discovered during pre-operative evaluation for his prostate cancer surgery. A loop recorder was implanted in his chest to monitor for A-fib, but it has never detected an episode. 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 A recent urinalysis showed blood on the dipstick, but the microscopic exam was normal.    PERTINENT MEDICAL HISTORY:  Prostate cancer: Biochemical recurrence  Atrial fibrillation: Diagnosed during pre-op for prostate cancer surgery, possibly congenital    PERTINENT SURGICAL HISTORY:  Prostatectomy: Date not specified, for prostate cancer treatment  Loop recorder implantation: Date not specified, for monitoring atrial fibrillation    PERTINENT TEST RESULTS:  PSA: Recent, 0.25, rising  Urinalysis: Recent, blood on dipstick, microscopic exam normal PET scan: Recent, showed possible spot in spine and radiotracer activity at prostatectomy bed  MRI Spine: Recent, no mass consistent with tumor metastasis       My notes:  psma scan 4/23/25 was inconclusive. Might be some recurrence at prostate bed and spine. The spine could also just be non-spcific and so they recommended an L spine to see  if anything correlates to this finding.L spine done 5/8 showed no mass concerning for metastatic cancer.   5/8/25 psa higher at  0.25    Psma pet scan 4/23/25  Ill-defined radiotracer activity involving the lower lumbar spine most pronounced at L5.  This is nonspecific and could be artifactual in nature as there is no CT correlate.  Consider lumbar spine MRI follow up.  Ill-defined radiotracer activity at the prostatectomy bed.  No other evidence of PSMA avid metastatic disease    L spine 5/8/25  1. Advanced multilevel degenerative changes of the lumbar spine with bulky bridging anterior marginal osteophyte formation, disc degeneration and moderate to severe facet arthropathy, as described in detail above, most pronounced at the L2-3 and L3-4 levels and resulting in severe neural foraminal and severe spinal canal stenosis.  2. Mild disc edema and reactive enhancement at the L1-2 and L4-5 disc levels, presumably degenerative, noting no associated destructive endplate changes, paraspinous fluid collections, or other secondary features to suggest discitis.  3. Modic type 1 endplate changes and associated ill-defined paraspinous edema and enhancement about bulky left anterolateral marginal osteophytes at L3-4, most likely degenerative/reactive.  4. No discrete enhancing intraspinal mass or evidence of an aggressive marrow replacement process, specifically at the L5 vertebral body level.      psa history   5/8/25  0.25  3/27/25 0.21  9/26/24 0.14  3/11/24  0.12  9/21/23 Pvr: 32  9/14/23 0.10  7/1/23  0.14  6/6/23  0.09  2/28/23 0.07  11/22/22 0.05  5/24/22  pvr by scan: 11  5/17/22 0.04   2/8/22  pvr by scan: 0  1/25/22 0.04  11/8/21 0.03  9/11/21 <0.1   7/31/20 T3a RALP Gl 4+3 with teriary 5 in <5%. Tumor involves 20 to 25% of prostate.   1/28/19  psa 2.9, trus vol: 40.5g, T2c (b palp nodules), Gl 3+3 in RBM, LML, LONDONO, LTZ (4/14).  HGPIN in  RBL, RBM (2/14). ASAP in [facility]LLY (1/14)  01/14/19          2.9, %free 15.5, JAHAIRA:  20g, definite nodule on left apex and one on right apex.   10/9/18            JAHAIRA: 20g gland without any discrete masses, left side however more firm, no tenderness.  9/18/18            2.1  9/2017             1.2      Urine history: eliquis for afib. No tobacco hx. + stones  3/27/25 Mod blood- 0 rbc/cyt: neg  12/11/24 neg  09/26/2024 trace blood.   9/21/23 Tr bld  7/10/23 Neg  6/27/23 E.coli, 3+bld/nit+/3+leuk, 20 rbc/>100 wbc/mod bacter  6/20/23 Large bld, 20 rbc/few bact  3/6/23  Tr bld  5/24/22 Tr bld  02/8/22 neg  11/10/21 90%calcium oxalate/10% calcium phosphate  10/27/21 Ng, void: red, 3+bld, >100 rbc, cyt: neg.   6/19/21 coreen  1/28/19 Ng, void: tr wbc- no sx of uti  1/21/19 ng, void: neg  1/15/19 No cx, void: tr blood, cytology: negative  10/9/18            No cx, void: neg       REVIEW OF SYSTEMS:neg  Objective:      There were no vitals filed for this visit.              Assessment:         Marcus Parisi is a 70 y.o. male with     1. Prostate cancer    2. 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 Microhematuria             Plan:     's typed/written- Abbreviated/Short Plan:  Rising psa s/p RalP 7/31/20- PSMA scan showed spot in spine, fu mri showed no mass c/w tumor/mets. However psma also showed activity (no discrete spot) in prostate bed. Psa continuing to rise.   Referral to rad onc to see if candidate for radiation +ADT  Referral to oncology () for adt if indicated   ED- continue tadalafil 5mg daily and 20mg prn   MH- mod blood on dipstick but ua microscopic showed no actual blood.     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   PET scan showed possible spot in spine and radiotracer activity in prostatectomy bed.   MRI confirmed no mass in spine.   Considered radiation therapy to prostate bed and potential androgen deprivation therapy (ADT).   Clarified that A-fib has not been observed even with loop recorder in place.    Please review the short plan as above for concise and accurate plan. The dictated/AI generated plan may have some inaccuracies .    PLAN SUMMARY:   Order PSA test at hospital in 3 months   Referral to medical oncology (Dr. Law) for ADT evaluation   Referral to radiation oncology for prostate bed radiation therapy assessment   Follow-up in 3 months for PSA test results and specialist evaluations    PROSTATE CANCER:   Explained potential side effects of androgen deprivation therapy, including fatigue, increased risk of cardiac events, loss of libido, and erectile dysfunction.   Referred to radiation oncology to evaluate candidacy for radiation therapy to prostate bed and potential ADT.   Referred to medical oncology (Dr. Law) for evaluation of ADT indication.   Follow up in 3 months.    BIOCHEMICALLY RECURRENT CASTRATION-RESISTANT ADENOCARCINOMA OF PROSTATE:   Ordered PSA test to be done at the hospital in 3 months and continue testing until further notice from specialists.   Explained potential side effects of androgen deprivation therapy, including fatigue, increased risk of cardiac events, loss of libido, and erectile dysfunction.   Referred to radiation oncology to evaluate candidacy for radiation therapy to prostate bed and potential ADT.   Referred to medical oncology (Dr. Law) for evaluation of ADT indication.   Follow up in 3 months.             Portions of this note was generated with the assistance of ambient listening technology. Verbal consent was obtained by the patient and accompanying visitor(s) for the recording of patient appointment to facilitate  this note. I attest to having reviewed and edited the generated note for accuracy, though some syntax or spelling errors may persist. Please contact the author of this note for any clarification.    Visit today included increased complexity associated with the care of the episodic problem addressed and managing the longitudinal care of the patient due to the serious and/or complex managed problem(s)                             Generally speaking, PSMA PET Scans using PSMA (18F-[id number]) have been shown to be effective at PSA levels above 0.2ng/mL. Also generally speaking, the higher the PSA level (when restaging patients), the more likely a PSMA PET Scan is to detect and identify recurrent prostate cancer.  psa diagnostic in 6 months and f/u after (standing orders in).   But if psa rising, will order prostate mri to eval for any local recurrence at sites of positive margins.         PSA doubling time info:  https://www.mdcalc.com/calc/95775/psa-doubling-time-psadt-calculator#evidence  https://jamanetwork.com/journals/kristin/fullarticle/940955  The length of time after surgery prior to biochemical recurrence was important in determining the risk of eventual distant failure for men with lower (5, 6, and 7) and men with high (8, 9, and 10) Lakeland scores (Figure 4). A similar observation was made previously by Rahul et al22 in a report demonstrating that a high Maricarmen score and advanced pathologic stage were important in determining the likelihood of local or distant failure. Using a cutoff of 10 months, PSADT provided further substratification for men with a Lakeland score of less than 8. Men with rapid PSA level elevation (<2 years), a Maricarmen score of 5 to 7, and a PSADT (>10 months) demonstrated a 76% probability of remaining free of metastatic disease for 5 years following initial PSA level elevation compared with men with a shorter PSADT (<10 months) who had only 35% chance of remaining free of metastatic disease  for 5 years after biochemical recurrence. Although not as strong as Maricarmen score, time of biochemical recurrence, and PSADT, the pathologic stage did contribute to the likelihood of distant metastasis (P=.01). The PSADT has been suggested by Kong et al13 as a useful predictor of the type of eventual recurrence after radical prostatectomy. They measured the PSADT for a group of 77 men with biochemical recurrence following radical retropubic prostatectomy and found that shorter PSADTs (<6 months) were more indicative of distant disease when compared with local recurrence.

## 2025-05-20 NOTE — TELEPHONE ENCOUNTER
Returned call and was informed Dr Hernandez has not called yet   And please be patient as she will call shortly   Pt vu

## 2025-05-20 NOTE — TELEPHONE ENCOUNTER
----- Message from Jesús sent at 5/20/2025  1:57 PM CDT -----  Contact: Ann-Marie [phone]  Type:  Needs Medical AdviceWho Called: Pt wife Ann-MarieWould the patient rather a call back or a response via [facility]? [employer] [employer] Call Back Number: [phone] Additional Information: Pt wife is concerned that they missed the appt. It is listed at [employer] and she is not sure if that will be on the phone or on the portal. Pls call back and adv. Thank you.

## 2025-05-20 NOTE — Clinical Note
· Rising psa s/p RalP 7/31/20- PSMA scan showed spot in spine, fu mri showed no mass c/w tumor/mets. However psma also showed activity (no discrete spot) in prostate bed. Psa continuing to rise.   o Referral to rad onc to see if candidate for radiation +ADT  o Referral to oncology () for adt if indicated

## 2025-05-21 ENCOUNTER — TELEPHONE (OUTPATIENT)
Facility: CLINIC | Age: 70
End: 2025-05-21
Payer: MEDICARE

## 2025-05-21 NOTE — NURSING
Oncology Navigation   Intake      Treatment                              Acuity      Follow Up  No follow-ups on file.     Spoke with patient and his wife about scheduling his appointment with Dr Ya. They verbalized understanding and agreement with appointment time and date (6/13/25 at 11:00).

## 2025-05-23 ENCOUNTER — OFFICE VISIT (OUTPATIENT)
Dept: RADIATION ONCOLOGY | Facility: CLINIC | Age: 70
End: 2025-05-23
Payer: MEDICARE

## 2025-05-23 VITALS
BODY MASS INDEX: 40.82 KG/M2 | WEIGHT: 276.38 LBS | TEMPERATURE: 98 F | OXYGEN SATURATION: 98 % | DIASTOLIC BLOOD PRESSURE: 56 MMHG | SYSTOLIC BLOOD PRESSURE: 114 MMHG | HEART RATE: 59 BPM

## 2025-05-23 DIAGNOSIS — Z19.2 BIOCHEMICALLY RECURRENT CASTRATION-RESISTANT ADENOCARCINOMA OF PROSTATE: ICD-10-CM

## 2025-05-23 DIAGNOSIS — R97.21 BIOCHEMICALLY RECURRENT CASTRATION-RESISTANT ADENOCARCINOMA OF PROSTATE: ICD-10-CM

## 2025-05-23 DIAGNOSIS — C61 PROSTATE CANCER: ICD-10-CM

## 2025-05-23 DIAGNOSIS — C61 BIOCHEMICALLY RECURRENT CASTRATION-RESISTANT ADENOCARCINOMA OF PROSTATE: ICD-10-CM

## 2025-05-23 NOTE — PROGRESS NOTES
Marcus Parisi  [id number]  1955 5/23/2025  Delmy Hernandez Md  [address] [place] [address] Dr  [address]  [place] [address]    Dx: Biochem recurrent PCa    HISTORY OF PRESENT ILLNESS:   Mr. Parisi is a 70M found on JAHAIRA w/ gland nodules but PSA WNL in 2018 and eventually dx'd w/ PCa, now s/p RP in 2020 w/ path findings: Gl 4+3 with teriary 5 in <5%. Tumor involves 20 to 25% of prostate. Right apical and Left posterior margins involved by cancer.     His PSA reached undetectable in 2021, however then began rising again in 2022. Most recently is has reached 0.25 on 5/8/25.  Thus he has now been referred to [facility] for eval/discussion re: salvage RT.    PSMA imaging showed vague scattered avidity across lower L-spine without CT correlate or lesion on subsequent MRI, as well as similar vague avidity in prostate fossa.  Both regions suspicious for artifact from nearby GI//vascular avidity.    Pt endorses persistent but stable urge incontinence since RP no longer requiring pads, but wears depends at night. Endorses ED, but denies other /GI issues.  Takes Eliquis for chronic Afib.      REVIEW OF SYSTEMS:  A complete ROS was performed and the patient denies any acute changes/concerns other than per HPI.    Past Medical History:   Diagnosis Date    A-fib     Allergy     Anticoagulant long-term use     Arthritis     Asthma     as a child    Hyperlipidemia     Hypertension     Personal history of colonic polyps 03/22/2024    Prostate cancer 2019    Sleep apnea     Use CPAP    Sleep apnea     uses C-PAP     Past Surgical History:   Procedure Laterality Date    CARPAL TUNNEL RELEASE Bilateral     COLONOSCOPY  03/22/2024    3 YR RECALL    CYSTOSCOPY N/A 01/28/2019    Procedure: CYSTOSCOPY;  Surgeon: Delmy Hernandez MD;  Location: [facility] OR;  Service: Urology;  Laterality: N/A;    CYSTOSCOPY N/A 07/10/2023    Procedure: CYSTOSCOPY;  Surgeon: Delmy Hernandez MD;  Location: [facility] OR;  Service: Urology;   Laterality: N/A;    CYSTOURETEROSCOPY WITH RETROGRADE PYELOGRAPHY AND INSERTION OF STENT INTO URETER Left 11/10/2021    Procedure: CYSTOURETEROSCOPY, WITH RETROGRADE PYELOGRAM AND URETERAL STENT INSERTION;  Surgeon: Delmy Hernandez MD;  Location: [facility] OR;  Service: Urology;  Laterality: Left;  please make 1st patient    INSERTION OF IMPLANTABLE LOOP RECORDER N/A 2021    Procedure: Insertion, Implantable Loop Recorder;  Surgeon: Almaz Rivas MD;  Location: [facility];  Service: Cardiology;  Laterality: N/A;    ROBOT-ASSISTED LAPAROSCOPIC PROSTATECTOMY USING DA LAVERN XI N/A 2020    Procedure: XI ROBOTIC PROSTATECTOMY;  Surgeon: Mejia Gold MD;  [email or web address] OR;  Service: Urology;  Laterality: N/A;    TONSILLECTOMY      TRANSRECTAL BIOPSY OF PROSTATE WITH ULTRASOUND GUIDANCE N/A 2019    Procedure: BIOPSY, PROSTATE, RECTAL APPROACH, WITH US GUIDANCE;  Surgeon: Delmy Hernandez MD;  Location: [facility] OR;  Service: Urology;  Laterality: N/A;     Social History     Socioeconomic History    Marital status:    Tobacco Use    Smoking status: Former     Current packs/day: 0.00     Average packs/day: 3.0 packs/day for 5.0 years (15.0 ttl pk-yrs)     Types: Cigarettes     Start date:      Quit date: 1975     Years since quittin.4    Smokeless tobacco: Former     Types: Chew    Tobacco comments:     quit 40 yrs ago   Substance and Sexual Activity    Alcohol use: Not Currently     Comment: occasional    Drug use: No    Sexual activity: Yes     Partners: Female     Social Drivers of Health     Financial Resource Strain: Low Risk  (3/21/2025)    Overall Financial Resource Strain (CARDIA)     Difficulty of Paying Living Expenses: Not very hard   Food Insecurity: No Food Insecurity (3/21/2025)    Hunger Vital Sign     Worried About Running Out of Food in the Last Year: Never true     Ran Out of Food in the Last Year: Never true   Transportation Needs: No Transportation Needs  (3/21/2025)    PRAPARE - Transportation     Lack of Transportation (Medical): No     Lack of Transportation (Non-Medical): No   Physical Activity: Inactive (3/21/2025)    Exercise Vital Sign     Days of Exercise per Week: 0 days     Minutes of Exercise per Session: 0 min   Stress: No Stress Concern Present (3/21/2025)    [employer] - Occupational Stress Questionnaire     Feeling of Stress : Not at all   Housing Stability: Low Risk  (3/21/2025)    Housing Stability Vital Sign     Unable to Pay for Housing in the Last Year: No     Homeless in the Last Year: No     Family History   Problem Relation Name Age of Onset    Cancer Mother      Asthma Mother      Diabetes Mother      Heart disease Mother      Hypertension Mother      Hypertension Father      Crohn's disease Brother      Cancer Maternal Grandmother         PRIOR HISTORY OF CHEMOTHERAPY OR RADIOTHERAPY: Please see HPI for patients prior oncologic history.    Medication List with Changes/Refills   Current Medications    AMLODIPINE-BENAZEPRIL 5-20 MG (LOTREL) 5-20 MG PER CAPSULE    TAKE 1 CAPSULE EVERY DAY    ATORVASTATIN (LIPITOR) 20 MG TABLET    TAKE 1 TABLET EVERY EVENING    CYANOCOBALAMIN (VITAMIN B-12) 1000 MCG TABLET    Take 1,000 mcg by mouth once daily.     LEVOTHYROXINE (SYNTHROID) 50 MCG TABLET    TAKE 1 TABLET BY MOUTH BEFORE BREAKFAST.    METOPROLOL SUCCINATE (TOPROL-XL) 25 MG 24 HR TABLET    TAKE 1 TABLET EVERY EVENING    METOPROLOL SUCCINATE (TOPROL-XL) 50 MG 24 HR TABLET    TAKE 1 TABLET (50 MG TOTAL) BY MOUTH EVERY MORNING.    OMEGA-3 ACID ETHYL ESTERS ORAL    Take 1 capsule by mouth once daily.     RIVAROXABAN (XARELTO) 20 MG TAB    Take 1 tablet (20 mg total) by mouth daily with dinner or evening meal.    TADALAFIL (CIALIS) 5 MG TABLET    Take 1 tablet (5 mg total) by mouth once daily. Take one every morning     Review of patient's allergies indicates:   Allergen Reactions    Adhesive        QUALITY OF LIFE: 80%-  Normal Activity with Effort: Some Symptoms of Disease    There were no vitals filed for this visit.  There is no height or weight on file to calculate BMI.    PHYSICAL EXAM:  GENERAL: alert; in no apparent distress.   HEAD: normocephalic, atraumatic.  EYES: pupils are equal, round, reactive to light and accommodation. Sclera anicteric. Conjunctiva not injected.   NOSE/THROAT: no nasal erythema or rhinorrhea. Oropharynx pink, without erythema, ulcerations or thrush.   NECK: no cervical motion rigidity; supple with no masses.  CHEST: clear to auscultation bilaterally; no wheezes, crackles or rubs. Patient is speaking comfortably on room air with normal work of breathing without using accessory muscles of respiration.  CARDIOVASCULAR: regular rate and rhythm; no murmurs, rubs or gallops.  ABDOMEN: soft, nontender, nondistended. Bowel sounds present.   MUSCULOSKELETAL: no tenderness to palpation along the spine or scapulae. Normal range of motion.  NEUROLOGIC: cranial nerves II-XII intact bilaterally. Strength 5/5 in bilateral upper and lower extremities. No sensory deficits appreciated. Reflexes globally intact. No cerebellar signs. Normal gait.  LYMPHATIC: no cervical, supraclavicular or axillary adenopathy appreciated bilaterally.   EXTREMITIES: no clubbing, cyanosis, edema.  SKIN: no erythema, rashes, ulcerations noted.     REVIEW OF IMAGING/PATHOLOGY/LABS: Please see HPI. All images reviewed personally by dictating physician.       ASSESSMENT: Marcus Parisi is a 70 y.o. male p/w biochem recurrent PCa    PLAN:  After complete review of the patient's chart/hx and eval/discussion w/ the patient today, I explained that w/ consensus opinion of NE on his PSMA the most likely location of residual malignant PCa is in his prostate fossa where (+)SM were noted in RP path.  Thus I am recommending salvage pelvic IMRT.      We discussed the option of adding ST-ADT but pt agreed and prefers to forego this option which is  reasonable with PSA<0.5 and slow rise of PSA over last 5 years.    We then further discussed RT via IMRT and/or brachytherapy.  I reviewed in detail the indications, differences, and potential toxicities, including but not limited to fatigue, skin irritation/erythema/desquamation, pain, worsening of dysuria, diarrhea, irritation of hemorrhoids, acute/chronic proctitis leading to rectal bleeding/discomfort and possible need for procedural intervention, cystitis with potential for perforation/fistulization requiring procedure intervention, erectile dysfunction, sterility, late urethral narrowing and/or stricture causing urinary retention and requiring procedural dilation or catheterization, increased risk of hip fracture, and secondary malignancy.  I carefully explained the process of simulation and treatment delivery with weekly physician visits. Increased chance of bleeding issues discussed in detail due to his use of Eliquis.    The patient verbalized understanding of the above plan, risks, benefits, and details, and informed consent was obtained.  We will proceed with RTP-CT imaging with plans to initiate RT w/in the next 1-2 weeks.  Contact info was exchanged, and the patient was encouraged to contact our clinic with any questions, needs, or concerns.    DISPOSITION: RTC FOR CT SIM    I have personally seen and evaluated this patient. Greater than 50% of this time was spent discussing coordination of care and/or counseling.    PHYSICIAN: Mike Bell III, MD    Thank you for the opportunity to meet and consult with Marcus Parisi.   Please feel free to contact me to discuss the above recommendation further.

## 2025-05-23 NOTE — PATIENT INSTRUCTIONS
Radiation to the male pelvis and skin care instruction sheet given along with bowel and bladder prep instructions.  Brochures given on  Radiation Therapy for Prostate Cancer and Radiation Therapy for Cancer.  Patient educational video web site address also provided.  Patient verbalized understanding. CT Simulation appt given for

## 2025-05-23 NOTE — PROGRESS NOTES
INITIAL [facility] HEM/ONC CONSULTATION      Subjective:       Patient ID: Marcus Parisi is a 70 y.o. male.    January 2019:  Diagnosed with T2c Maricarmen 3+3 adenocarcinoma of the prostate with PSA 2.9.     7/1/2020:  RALP performed:   Maricarmen 4+3 with teriary 5 in <5%. Tumor involves 20 to 25% of prostate. Right apical and Left posterior margins involved by cancer.     PSA 0.03 at lowest in November of 2021 and has had very slow rise of PSA from 2624-0409 with his most recent being 0.25 on 5/8/2025.     4/23/2025-PSMA PET:  Ill-defined radiotracer activity involving the lower lumbar spine most pronounced at L5.  This is nonspecific and could be artifactual in nature as there is no CT correlate.  Consider lumbar spine MRI follow up.(MRI 5/8/2025 shows no enhancing lesion in this area.      Ill-defined radiotracer activity at the prostatectomy bed.     No other evidence of PSMA avid metastatic disease.    5/20/2025-messaging from Dr. Hernandez:  Delmy Hernandez MD  P Bhakti Pate Staff; P Ray Mckeon Staff; Mike Bell III, MD; Rio Ya MD  · Rising psa s/p RalP 7/31/20- PSMA scan showed spot in spine, fu mri showed no mass c/w tumor/mets. However psma also showed activity (no discrete spot) in prostate bed. Psa continuing to rise.  o Referral to rad onc to see if candidate for radiation +ADT  o Referral to oncology () for adt if indicated    Seen by Rad/onc 5/23/2025    Chief Complaint: No chief complaint on file.  Prostate Cancer    Mr. Parisi is a 70yl male who was diagnosed with prostate cancer in 2019.  He underwent robotic prostatectomy at that time and initially did well.  More recently he was found to have a PSA recurrence and PSMA pet shows questionable uptake in the prostate bed but no clear metastatic disease.      Patient has a history of Afib, Htn.   Denies CAD/AMI, lung/liver or renal disease.         Past Medical History:   Diagnosis Date    A-fib     Allergy      Anticoagulant long-term use     Arthritis     Asthma     as a child    Hyperlipidemia     Hypertension     Personal history of colonic polyps 03/22/2024    Prostate cancer 2019    Sleep apnea     Use CPAP    Sleep apnea     uses C-PAP       Past Surgical History:   Procedure Laterality Date    CARPAL TUNNEL RELEASE Bilateral     COLONOSCOPY  03/22/2024    3 YR RECALL    CYSTOSCOPY N/A 01/28/2019    Procedure: CYSTOSCOPY;  Surgeon: Delmy Hernandez MD;  Location: [facility] OR;  Service: Urology;  Laterality: N/A;    CYSTOSCOPY N/A 07/10/2023    Procedure: CYSTOSCOPY;  Surgeon: Delmy Hernandez MD;  Location: [facility] OR;  Service: Urology;  Laterality: N/A;    CYSTOURETEROSCOPY WITH RETROGRADE PYELOGRAPHY AND INSERTION OF STENT INTO URETER Left 11/10/2021    Procedure: CYSTOURETEROSCOPY, WITH RETROGRADE PYELOGRAM AND URETERAL STENT INSERTION;  Surgeon: Delmy Hernandez MD;  Location: [facility] OR;  Service: Urology;  Laterality: Left;  please make 1st patient    INSERTION OF IMPLANTABLE LOOP RECORDER N/A 04/23/2021    Procedure: Insertion, Implantable Loop Recorder;  Surgeon: Almaz Rivas MD;  Location: [facility] CATH;  Service: Cardiology;  Laterality: N/A;    ROBOT-ASSISTED LAPAROSCOPIC PROSTATECTOMY USING DA LAVERN XI N/A 07/30/2020    Procedure: XI ROBOTIC PROSTATECTOMY;  Surgeon: Mejia Gold MD;  Location: [facility] OR;  Service: Urology;  Laterality: N/A;    TONSILLECTOMY      TRANSRECTAL BIOPSY OF PROSTATE WITH ULTRASOUND GUIDANCE N/A 01/28/2019    Procedure: BIOPSY, PROSTATE, RECTAL APPROACH, WITH US GUIDANCE;  Surgeon: Delmy Hernandez MD;  Location: [facility] OR;  Service: Urology;  Laterality: N/A;       Social History     Socioeconomic History    Marital status:    Tobacco Use    Smoking status: Former     Current packs/day: 0.00     Average packs/day: 3.0 packs/day for 5.0 years (15.0 ttl pk-yrs)     Types: Cigarettes     Start date: 1970     Quit date: 1975     Years since  quittin.4    Smokeless tobacco: Former     Types: Chew    Tobacco comments:     quit 40 yrs ago   Substance and Sexual Activity    Alcohol use: Not Currently     Comment: occasional    Drug use: No    Sexual activity: Yes     Partners: Female     Social Drivers of Health     Financial Resource Strain: Low Risk  (3/21/2025)    Overall Financial Resource Strain (CARDIA)     Difficulty of Paying Living Expenses: Not very hard   Food Insecurity: No Food Insecurity (3/21/2025)    Hunger Vital Sign     Worried About Running Out of Food in the Last Year: Never true     Ran Out of Food in the Last Year: Never true   Transportation Needs: No Transportation Needs (3/21/2025)    PRAPARE - Transportation     Lack of Transportation (Medical): No     Lack of Transportation (Non-Medical): No   Physical Activity: Inactive (3/21/2025)    Exercise Vital Sign     Days of Exercise per Week: 0 days     Minutes of Exercise per Session: 0 min   Stress: No Stress Concern Present (3/21/2025)    [employer] - Occupational Stress Questionnaire     Feeling of Stress : Not at all   Housing Stability: Low Risk  (3/21/2025)    Housing Stability Vital Sign     Unable to Pay for Housing in the Last Year: No     Homeless in the Last Year: No       Family History   Problem Relation Name Age of Onset    Cancer Mother      Asthma Mother      Diabetes Mother      Heart disease Mother      Hypertension Mother      Hypertension Father      Crohn's disease Brother      Cancer Maternal Grandmother         Review of patient's allergies indicates:   Allergen Reactions    Adhesive      Current Medications[1]    All medications and past history have been reviewed.    Review of Systems   Constitutional:  Negative for fever and unexpected weight change.   HENT:  Negative for nosebleeds.    Respiratory:  Negative for chest tightness and shortness of breath.    Cardiovascular:  Negative for chest pain.   Gastrointestinal:  Negative  for abdominal pain and blood in stool.   Genitourinary:  Negative for hematuria.   Skin:  Negative for rash.   Hematological:  Does not bruise/bleed easily.       Objective:        BP (!) 106/57   Pulse 72   Temp 98.3 °F (36.8 °C)   Resp 18   Wt 126.9 kg (279 lb 12.8 oz)   BMI 41.32 kg/m²     Physical Exam  Constitutional:       Appearance: Normal appearance.   HENT:      Head: Normocephalic and atraumatic.   Eyes:      General: No scleral icterus.     Conjunctiva/sclera: Conjunctivae normal.   Cardiovascular:      Rate and Rhythm: Normal rate.   Pulmonary:      Effort: Pulmonary effort is normal.   Abdominal:      General: Abdomen is flat.   Neurological:      General: No focal deficit present.      Mental Status: He is alert and oriented to person, place, and time.   Psychiatric:         Mood and Affect: Mood normal.         Behavior: Behavior normal.           Lab  No results found for this or any previous visit (from the past 2 weeks).  CMP  Sodium   Date Value Ref Range Status   12/11/2024 138 135 - 146 mmol/L Final     Potassium   Date Value Ref Range Status   12/11/2024 4.0 3.5 - 5.3 mmol/L Final     Chloride   Date Value Ref Range Status   12/11/2024 101 98 - 110 mmol/L Final     CO2   Date Value Ref Range Status   12/11/2024 29 20 - 32 mmol/L Final     Glucose   Date Value Ref Range Status   12/11/2024 107 (H) 65 - 99 mg/dL Final     Comment:                   Fasting reference interval     For someone without known diabetes, a glucose value  between 100 and 125 mg/dL is consistent with  prediabetes and should be confirmed with a  follow-up test.          BUN   Date Value Ref Range Status   12/11/2024 20 7 - 25 mg/dL Final     Creatinine   Date Value Ref Range Status   05/08/2025 1.0 0.5 - 1.4 mg/dL Final     Calcium   Date Value Ref Range Status   12/11/2024 9.4 8.6 - 10.3 mg/dL Final     Total Protein   Date Value Ref Range Status   12/11/2024 6.6 6.1 - 8.1 g/dL Final     Albumin   Date Value Ref  Range Status   12/11/2024 4.3 3.6 - 5.1 g/dL Final     Total Bilirubin   Date Value Ref Range Status   12/11/2024 2.8 (H) 0.2 - 1.2 mg/dL Final     Alkaline Phosphatase   Date Value Ref Range Status   06/18/2024 73 55 - 135 U/L Final     AST   Date Value Ref Range Status   12/11/2024 15 10 - 35 U/L Final     ALT   Date Value Ref Range Status   12/11/2024 14 9 - 46 U/L Final     Anion Gap   Date Value Ref Range Status   06/18/2024 7 (L) 8 - 16 mmol/L Final     eGFR if    Date Value Ref Range Status   06/13/2022 93 > OR = 60 mL/min/1.73m2 Final     eGFR if non    Date Value Ref Range Status   06/13/2022 80 > OR = 60 mL/min/1.73m2 Final         Specimen (24h ago, onward)      None                  All lab results and imaging results have been reviewed and discussed with the patient.     Assessment:       1. Prostate cancer    2. 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 Parisi today.  The PSMA appears to show a local recurrence in the prostate bed with no sign of metastatic disease.  The best option here for possible cure would be radiation therapy with +/- short-term ADT, approximately 6 months.  I discussed this with him today in detail and reviewed the risks and benefits of this approach.      He has met with radiation therapy and is now on therapy.      ADT could be considered but given this low PSA and small volume radiation alone is likely sufficient.      Reviewed this today and will see him as needed given he will have follow up care with both radiation and urology.            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        Plan:         Follow up if symptoms worsen or fail  to improve.       The plan was discussed with the patient and all questions/concerns have been answered to the patient's satisfaction.                   [1]   Current Outpatient Medications:     amlodipine-benazepril 5-20 mg (LOTREL) 5-20 mg per capsule, TAKE 1 CAPSULE EVERY DAY, Disp: 90 capsule, Rfl: 3    apixaban (ELIQUIS) 5 mg Tab, Take 1 tablet (5 mg total) by mouth 2 (two) times daily., Disp: 180 tablet, Rfl: 3    atorvastatin (LIPITOR) 20 MG tablet, TAKE 1 TABLET EVERY EVENING, Disp: 90 tablet, Rfl: 3    cyanocobalamin (VITAMIN B-12) 1000 MCG tablet, Take 1,000 mcg by mouth once daily. , Disp: , Rfl:     levothyroxine (SYNTHROID) 50 MCG tablet, TAKE 1 TABLET BY MOUTH BEFORE BREAKFAST., Disp: 90 tablet, Rfl: 3    metoprolol succinate (TOPROL-XL) 25 MG 24 hr tablet, TAKE 1 TABLET EVERY EVENING, Disp: 90 tablet, Rfl: 3    metoprolol succinate (TOPROL-XL) 50 MG 24 hr tablet, TAKE 1 TABLET (50 MG TOTAL) BY MOUTH EVERY MORNING., Disp: 90 tablet, Rfl: 1    OMEGA-3 ACID ETHYL ESTERS ORAL, Take 1 capsule by mouth once daily. , Disp: , Rfl:     tadalafiL (CIALIS) 5 MG tablet, Take 1 tablet (5 mg total) by mouth once daily. Take one every morning, Disp: 90 tablet, Rfl: 3

## 2025-06-13 ENCOUNTER — OFFICE VISIT (OUTPATIENT)
Facility: CLINIC | Age: 70
End: 2025-06-13
Payer: MEDICARE

## 2025-06-13 VITALS
HEART RATE: 72 BPM | TEMPERATURE: 98 F | RESPIRATION RATE: 18 BRPM | WEIGHT: 279.81 LBS | SYSTOLIC BLOOD PRESSURE: 106 MMHG | BODY MASS INDEX: 41.32 KG/M2 | DIASTOLIC BLOOD PRESSURE: 57 MMHG

## 2025-06-13 DIAGNOSIS — C61 BIOCHEMICALLY RECURRENT CASTRATION-RESISTANT ADENOCARCINOMA OF PROSTATE: ICD-10-CM

## 2025-06-13 DIAGNOSIS — R97.21 BIOCHEMICALLY RECURRENT CASTRATION-RESISTANT ADENOCARCINOMA OF PROSTATE: ICD-10-CM

## 2025-06-13 DIAGNOSIS — Z19.2 BIOCHEMICALLY RECURRENT CASTRATION-RESISTANT ADENOCARCINOMA OF PROSTATE: ICD-10-CM

## 2025-06-13 DIAGNOSIS — C61 PROSTATE CANCER: Primary | ICD-10-CM

## 2025-06-13 PROCEDURE — 99999 PR PBB SHADOW E&M-EST. PATIENT-LVL III: CPT | Mod: PBBFAC,HCNC,, | Performed by: INTERNAL MEDICINE

## 2025-06-13 NOTE — ASSESSMENT & PLAN NOTE
I had a long discussion with Mr. Parisi today.  The PSMA appears to show a local recurrence in the prostate bed with no sign of metastatic disease.  The best option here for possible cure would be radiation therapy with +/- short-term ADT, approximately 6 months.  I discussed this with him today in detail and reviewed the risks and benefits of this approach.      He has met with radiation therapy and is now on therapy.      ADT could be considered but given this low PSA and small volume radiation alone is likely sufficient.      Reviewed this today and will see him as needed given he will have follow up care with both radiation and urology.

## 2025-06-24 ENCOUNTER — OFFICE VISIT (OUTPATIENT)
Dept: FAMILY MEDICINE | Facility: CLINIC | Age: 70
End: 2025-06-24
Payer: MEDICARE

## 2025-06-24 VITALS
WEIGHT: 278.25 LBS | BODY MASS INDEX: 41.21 KG/M2 | SYSTOLIC BLOOD PRESSURE: 110 MMHG | DIASTOLIC BLOOD PRESSURE: 60 MMHG | OXYGEN SATURATION: 97 % | HEART RATE: 74 BPM | TEMPERATURE: 98 F | HEIGHT: 69 IN

## 2025-06-24 DIAGNOSIS — E78.5 DYSLIPIDEMIA: ICD-10-CM

## 2025-06-24 DIAGNOSIS — Z13.1 DIABETES MELLITUS SCREENING: ICD-10-CM

## 2025-06-24 DIAGNOSIS — I10 ESSENTIAL (PRIMARY) HYPERTENSION: Primary | ICD-10-CM

## 2025-06-24 DIAGNOSIS — R97.20 RISING PSA LEVEL: ICD-10-CM

## 2025-06-24 DIAGNOSIS — E66.813 CLASS 3 SEVERE OBESITY DUE TO EXCESS CALORIES WITH SERIOUS COMORBIDITY AND BODY MASS INDEX (BMI) OF 40.0 TO 44.9 IN ADULT: ICD-10-CM

## 2025-06-24 DIAGNOSIS — R73.01 ELEVATED FASTING GLUCOSE: ICD-10-CM

## 2025-06-24 DIAGNOSIS — E55.9 VITAMIN D DEFICIENCY: ICD-10-CM

## 2025-06-24 DIAGNOSIS — E03.9 ACQUIRED HYPOTHYROIDISM: ICD-10-CM

## 2025-06-24 DIAGNOSIS — E66.01 CLASS 3 SEVERE OBESITY DUE TO EXCESS CALORIES WITH SERIOUS COMORBIDITY AND BODY MASS INDEX (BMI) OF 40.0 TO 44.9 IN ADULT: ICD-10-CM

## 2025-06-24 PROCEDURE — 99999 PR PBB SHADOW E&M-EST. PATIENT-LVL III: CPT | Mod: PBBFAC,HCNC,, | Performed by: NURSE PRACTITIONER

## 2025-06-24 RX ORDER — LEVOTHYROXINE SODIUM 50 UG/1
50 TABLET ORAL
Qty: 90 TABLET | Refills: 3 | Status: SHIPPED | OUTPATIENT
Start: 2025-06-24

## 2025-06-24 RX ORDER — ATORVASTATIN CALCIUM 20 MG/1
20 TABLET, FILM COATED ORAL NIGHTLY
Qty: 90 TABLET | Refills: 3 | Status: SHIPPED | OUTPATIENT
Start: 2025-06-24

## 2025-06-24 RX ORDER — AMLODIPINE AND BENAZEPRIL HYDROCHLORIDE 5; 20 MG/1; MG/1
1 CAPSULE ORAL DAILY
Qty: 90 CAPSULE | Refills: 3 | Status: SHIPPED | OUTPATIENT
Start: 2025-06-24

## 2025-06-24 NOTE — PROGRESS NOTES
Subjective:       Patient ID: Marcus Parisi is a 70 y.o. male.    Chief Complaint: Hyperlipidemia, Hypertension, Thyroid Problem, and Follow-up    History of Present Illness    CHIEF COMPLAINT:  Patient presents today to discuss results of recent labs and for a follow-up on his ongoing medical conditions.    HPI:  Patient recently underwent labs and is here to review the results. He reports taking D3 supplements, which have helped maintain his Vitamin D levels at 54. Patient has made dietary changes, including increased avocado consumption and incorporating fish oil supplements, resulting in an improvement in his HDL cholesterol from 28 to 33. He has reduced sugar intake and modified his diet, noting a change in his food preferences. His current eating habits include consuming turkey slices as snacks and tomato sandwiches on low-calorie bread, with occasional ice cream consumption in controlled portion sizes.    Patient is currently undergoing radiation therapy for prostate cancer, attending treatments 5 days a week for a total of 39 treatments over 8 weeks. He occasionally has low-grade fevers, ranging from 99.6 to 100 Fahrenheit, which may last for a day or so before resolving. Two spots were identified on imaging - one on the spine and another near the site of prostate removal. His PSA levels have been increasing, prompting the current radiation treatment. He is seeing Dr. Ya for management.    Patient has a history of atrial fibrillation (AFib), which has been monitored but not actively treated for the past 5 years. He takes medication to prevent strokes related to AFib. Patient reports occasional difficulty swallowing but has not had any esophageal procedures such as dilation.    Patient discontinued consuming chocolates or candies approximately 20 years ago. He denies significant AFib symptoms or episodes that affect his daily activities.    MEDICATIONS:  Patient is on a Vitamin D supplement, with levels  at 54 which is within the desired range. He is also taking a Biotin supplement, thyroid medication, and a fish oil supplement. For his atrial fibrillation, he is on Eliquis to prevent strokes.    MEDICAL HISTORY:  Patient has a history of atrial fibrillation. He also has a history of prostate cancer, with two spots detected on a PET scan, one on the spine and one on another part of the bone.    SURGICAL HISTORY:  Patient underwent prostate removal for prostate cancer. The date of the surgery was not specified.    TEST RESULTS:  Patient's Vitamin D level is 54, TSH is 3, and Free T4 is normal. His HDL (good cholesterol) has increased from 28 to 33, with a goal of above 40. LDL is 69, with a goal of less than 70. A1C is 5.4%. A urine test showed normal creatinine levels. Blood counts are normal, and fasting glucose is 113. A loop recording showed a normal heartbeat.    IMAGING:  A PET scan revealed two spots, one on the spine and one on another part of the bone where the prostate was before removal. An MRI showed no evidence of cancer.    SOCIAL HISTORY:  Occupation: Former   [employer]: Attends [employer]    Marital status:       ROS:  General: +fever, -chills, -fatigue, -weight gain, -weight loss  Eyes: -vision changes, -redness, -discharge  ENT: -ear pain, -nasal congestion, -sore throat, +difficulty swallowing  Cardiovascular: -chest pain, -palpitations, -lower extremity edema  Respiratory: -cough, -shortness of breath  Gastrointestinal: -abdominal pain, -nausea, -vomiting, -diarrhea, -constipation, -blood in stool  Genitourinary: -dysuria, -hematuria, -frequency  Musculoskeletal: -joint pain, -muscle pain  Skin: -rash, -lesion  Neurological: -headache, -dizziness, -numbness, -tingling  Psychiatric: -anxiety, -depression, -sleep difficulty         Past Medical History:   Diagnosis Date    A-fib     Allergy     Anticoagulant long-term use     Arthritis     Asthma     as a child    Hyperlipidemia      Hypertension     Personal history of colonic polyps 03/22/2024    Prostate cancer 2019    Sleep apnea     Use CPAP    Sleep apnea     uses C-PAP        Past Surgical History:   Procedure Laterality Date    CARPAL TUNNEL RELEASE Bilateral     COLONOSCOPY  03/22/2024    3 YR RECALL    CYSTOSCOPY N/A 01/28/2019    Procedure: CYSTOSCOPY;  Surgeon: Delmy Hernandez MD;  Location: [facility] OR;  Service: Urology;  Laterality: N/A;    CYSTOSCOPY N/A 07/10/2023    Procedure: CYSTOSCOPY;  Surgeon: Delmy Hernandez MD;  Location: [facility] OR;  Service: Urology;  Laterality: N/A;    CYSTOURETEROSCOPY WITH RETROGRADE PYELOGRAPHY AND INSERTION OF STENT INTO URETER Left 11/10/2021    Procedure: CYSTOURETEROSCOPY, WITH RETROGRADE PYELOGRAM AND URETERAL STENT INSERTION;  Surgeon: Delmy Hernadnez MD;  Location: [facility] OR;  Service: Urology;  Laterality: Left;  please make 1st patient    INSERTION OF IMPLANTABLE LOOP RECORDER N/A 04/23/2021    Procedure: Insertion, Implantable Loop Recorder;  Surgeon: Almaz Rivas MD;  Location: [facility] CATH;  Service: Cardiology;  Laterality: N/A;    ROBOT-ASSISTED LAPAROSCOPIC PROSTATECTOMY USING DA LAVERN XI N/A 07/30/2020    Procedure: XI ROBOTIC PROSTATECTOMY;  Surgeon: Mejia Gold MD;  Location: [facility] OR;  Service: Urology;  Laterality: N/A;    TONSILLECTOMY      TRANSRECTAL BIOPSY OF PROSTATE WITH ULTRASOUND GUIDANCE N/A 01/28/2019    Procedure: BIOPSY, PROSTATE, RECTAL APPROACH, WITH US GUIDANCE;  Surgeon: Delmy Hernandez MD;  Location: [facility] OR;  Service: Urology;  Laterality: N/A;       Family History   Problem Relation Name Age of Onset    Cancer Mother      Asthma Mother      Diabetes Mother      Heart disease Mother      Hypertension Mother      Hypertension Father      Crohn's disease Brother      Cancer Maternal Grandmother         Social History     Socioeconomic History    Marital status:    Tobacco Use    Smoking status: Former     Current  "packs/day: 0.00     Average packs/day: 3.0 packs/day for 5.0 years (15.0 ttl pk-yrs)     Types: Cigarettes     Start date:      Quit date: 1975     Years since quittin.5    Smokeless tobacco: Former     Types: Chew    Tobacco comments:     quit 40 yrs ago   Substance and Sexual Activity    Alcohol use: Not Currently     Comment: occasional    Drug use: No    Sexual activity: Yes     Partners: Female     Social Drivers of Health     Financial Resource Strain: Low Risk  (3/21/2025)    Overall Financial Resource Strain (CARDIA)     Difficulty of Paying Living Expenses: Not very hard   Food Insecurity: No Food Insecurity (3/21/2025)    Hunger Vital Sign     Worried About Running Out of Food in the Last Year: Never true     Ran Out of Food in the Last Year: Never true   Transportation Needs: No Transportation Needs (3/21/2025)    PRAPARE - Transportation     Lack of Transportation (Medical): No     Lack of Transportation (Non-Medical): No   Physical Activity: Unknown (3/21/2025)    Exercise Vital Sign     Days of Exercise per Week: 0 days   Recent Concern: Physical Activity - Inactive (3/21/2025)    Exercise Vital Sign     Days of Exercise per Week: 0 days     Minutes of Exercise per Session: 0 min   Stress: No Stress Concern Present (3/21/2025)    [employer] - Occupational Stress Questionnaire     Feeling of Stress : Not at all   Housing Stability: Low Risk  (3/21/2025)    Housing Stability Vital Sign     Unable to Pay for Housing in the Last Year: No     Homeless in the Last Year: No       Current Medications[1]    Review of patient's allergies indicates:   Allergen Reactions    Adhesive      Objective:      Blood pressure 110/60, pulse 74, temperature 97.9 °F (36.6 °C), height 5' 9" (1.753 m), weight 126.2 kg (278 lb 3.5 oz), SpO2 97%. Body mass index is 41.09 kg/m².   Physical Exam    General: No acute distress. Well-developed. Well-nourished. BMI 41.09  Eyes: EOMI. Sclerae " anicteric.  HENT: Normocephalic. Atraumatic. Nares patent. Moist oral mucosa. All normal.  Cardiovascular: Regular rate. Regular rhythm. No murmurs. No rubs. No gallops. Normal S1, S2.  Respiratory: Normal respiratory effort. Clear to auscultation bilaterally. No rales. No rhonchi. No wheezing.  Abdomen: Soft. Non-distended.   Musculoskeletal: No  obvious deformity.  Extremities: No lower extremity edema.  Neurological: Alert & oriented x3. No slurred speech. Normal gait.  Psychiatric: Normal mood. Normal affect. Good insight. Good judgment.  Skin: Warm. Dry. No rash.         Assessment:       Assessment & Plan    - Reviewed recent lab results:  - Vitamin D level at 54, considered good  - TSH at 3 and Free T4 normal, indicating stable thyroid function  - HDL cholesterol improved from 28 to 33, though still below goal of 40  - LDL cholesterol at 69, meeting goal of less than 70  - A1C decreased to 5.4%, showing good glycemic control  - Urine tests, blood counts, and other lab values WNL  - Noted fasting glucose of 113, slightly elevated compared to A1C results  - Assessed current dietary habits and lifestyle modifications  - Evaluated ongoing radiation therapy for prostate cancer, noting low-grade fever as a side effect  - Considered current management of a-fib, maintaining current approach with anticoagulation    PROSTATE CANCER AND BONE METASTASIS:  - Identified two spots on the patient's spine and another part of the bone where prostate was before removal, appearing as little black spots on PET scan.  - MRI results show nothing lit up as cancer, but PSA levels continue to increase.  - Oncologist suspects residual cancer cells in these spots are creating PSA.  - Initiated radiation therapy targeting the lower part of the spine, consisting of 39 treatments over 8 weeks.    ATRIAL FIBRILLATION:  - Patient has been taking anticoagulants to prevent strokes due to atrial fibrillation.  - Loop recording shows AFib as  normal heartbeat, not severe.  - Advised patient to continue anticoagulant medication to prevent thrombus formation.    LIPOPROTEIN DEFICIENCY:  - HDL cholesterol has increased from 28 to 33 (goal: above 40), while LDL is at 69 (goal: less than 70).  - Explained significance of improved HDL cholesterol for cardiovascular health.  - Advised patient to continue consuming foods that may help increase HDL cholesterol, such as avocados and fish, and to continue fish oil supplementation.    IMPAIRED FASTING GLUCOSE:  - Fasting glucose is 113, higher than expected given the A1C level of 5.4%.  - Provided information on relationship between dietary choices and glucose control.  - Advised patient to maintain current dietary habits, including portion control and healthier snack choices.  - Ordered repeat labs in 6 months to monitor glucose levels.    DYSPHAGIA:  - Patient reports occasional problems when swallowing, possibly due to inadequate mastication.  - Throat exam appears normal, but occasional irritation could be attributed to acid reflux.  - Advised patient to pay attention to swallowing issues and consider esophageal dilation if symptoms worsen.    GENERAL HEALTH MAINTENANCE:  - Patient to continue current exercise routine of going to the gym 5 days a week.  - Explained importance of maintaining current Vitamin D levels.  - Continued biotin supplementation at current dose.  - Ordered repeat labs in 6 months with follow-up visit to review results.       Plan:       Marcus was seen today for hyperlipidemia, hypertension, thyroid problem and follow-up.    Diagnoses and all orders for this visit:    Essential (primary) hypertension  -     Urinalysis; Future  -     Comprehensive Metabolic Panel; Future  -     CBC Auto Differential; Future  -     Microalbumin/Creatinine Ratio, Urine; Future  -     amlodipine-benazepril 5-20 mg (LOTREL) 5-20 mg per capsule; Take 1 capsule by mouth once daily.  -     Urinalysis  -      Comprehensive Metabolic Panel  -     CBC Auto Differential  -     Microalbumin/Creatinine Ratio, Urine    Elevated fasting glucose  -     Comprehensive Metabolic Panel; Future  -     Hemoglobin A1C; Future  -     Comprehensive Metabolic Panel  -     Hemoglobin A1C    Diabetes mellitus screening  -     Hemoglobin A1C; Future  -     Hemoglobin A1C    Dyslipidemia  -     Lipid Panel; Future  -     atorvastatin (LIPITOR) 20 MG tablet; Take 1 tablet (20 mg total) by mouth every evening.  -     Lipid Panel    Acquired hypothyroidism  -     T4, Free; Future  -     TSH; Future  -     levothyroxine (SYNTHROID) 50 MCG tablet; Take 1 tablet (50 mcg total) by mouth before breakfast.  -     T4, Free  -     TSH    Vitamin D deficiency  -     Vitamin D; Future  -     Vitamin D    Rising PSA level  Keep follow up with oncology and treatment plan    Class 3 severe obesity due to excess calories with serious comorbidity and body mass index (BMI) of 40.0 to 44.9 in adult  The patient is asked to make an attempt to improve diet and exercise patterns to aid in medical management of this problem.           This note was generated with the assistance of ambient listening technology. Verbal consent was obtained by the patient and accompanying visitor(s) for the recording of patient appointment to facilitate this note. I attest to having reviewed and edited the generated note for accuracy, though some syntax or spelling errors may persist. Please contact the author of this note for any clarification.    Visit today included increased complexity associated with the care of the episodic problem rising PSA and current radiation therapy addressed and managing the longitudinal care of the patient due to the serious and/or complex managed problem(s) elevated fasting glucose, a-fib, hypertension, hyperlipidemia.          [1]   Current Outpatient Medications   Medication Sig Dispense Refill    apixaban (ELIQUIS) 5 mg Tab Take 1 tablet (5 mg total)  by mouth 2 (two) times daily. 180 tablet 3    cyanocobalamin (VITAMIN B-12) 1000 MCG tablet Take 1,000 mcg by mouth once daily.       metoprolol succinate (TOPROL-XL) 25 MG 24 hr tablet TAKE 1 TABLET EVERY EVENING 90 tablet 3    metoprolol succinate (TOPROL-XL) 50 MG 24 hr tablet TAKE 1 TABLET (50 MG TOTAL) BY MOUTH EVERY MORNING. 90 tablet 1    OMEGA-3 ACID ETHYL ESTERS ORAL Take 1 capsule by mouth once daily.       simethicone (GAS-X ORAL) Take by mouth.      tadalafiL (CIALIS) 5 MG tablet Take 1 tablet (5 mg total) by mouth once daily. Take one every morning 90 tablet 3    amlodipine-benazepril 5-20 mg (LOTREL) 5-20 mg per capsule Take 1 capsule by mouth once daily. 90 capsule 3    atorvastatin (LIPITOR) 20 MG tablet Take 1 tablet (20 mg total) by mouth every evening. 90 tablet 3    levothyroxine (SYNTHROID) 50 MCG tablet Take 1 tablet (50 mcg total) by mouth before breakfast. 90 tablet 3     No current facility-administered medications for this visit.

## 2025-07-10 ENCOUNTER — LAB VISIT (OUTPATIENT)
Dept: LAB | Facility: HOSPITAL | Age: 70
End: 2025-07-10
Attending: UROLOGY
Payer: MEDICARE

## 2025-07-10 DIAGNOSIS — N52.1 ERECTILE DYSFUNCTION DUE TO DISEASES CLASSIFIED ELSEWHERE: ICD-10-CM

## 2025-07-10 DIAGNOSIS — R31.29 MICROHEMATURIA: ICD-10-CM

## 2025-07-10 DIAGNOSIS — C61 PROSTATE CANCER: ICD-10-CM

## 2025-07-10 LAB — PSA SERPL-MCNC: 0.18 NG/ML (ref ?–4)

## 2025-07-10 PROCEDURE — 84153 ASSAY OF PSA TOTAL: CPT

## 2025-07-10 PROCEDURE — 36415 COLL VENOUS BLD VENIPUNCTURE: CPT

## 2025-07-17 ENCOUNTER — OFFICE VISIT (OUTPATIENT)
Dept: UROLOGY | Facility: CLINIC | Age: 70
End: 2025-07-17
Payer: MEDICARE

## 2025-07-17 VITALS — WEIGHT: 278.25 LBS | HEIGHT: 69 IN | BODY MASS INDEX: 41.21 KG/M2

## 2025-07-17 DIAGNOSIS — C61 PROSTATE CANCER: Primary | ICD-10-CM

## 2025-07-17 DIAGNOSIS — R31.29 MICROHEMATURIA: ICD-10-CM

## 2025-07-17 LAB
BILIRUBIN, UA POC OHS: NEGATIVE
BLOOD, UA POC OHS: ABNORMAL
CLARITY, UA POC OHS: CLEAR
COLOR, UA POC OHS: YELLOW
GLUCOSE, UA POC OHS: NEGATIVE
KETONES, UA POC OHS: NEGATIVE
LEUKOCYTES, UA POC OHS: NEGATIVE
NITRITE, UA POC OHS: NEGATIVE
PH, UA POC OHS: 5.5
PROTEIN, UA POC OHS: NEGATIVE
SPECIFIC GRAVITY, UA POC OHS: 1.02
UROBILINOGEN, UA POC OHS: 0.2

## 2025-07-17 PROCEDURE — 99214 OFFICE O/P EST MOD 30 MIN: CPT | Mod: HCNC,S$GLB,, | Performed by: UROLOGY

## 2025-07-17 PROCEDURE — 1160F RVW MEDS BY RX/DR IN RCRD: CPT | Mod: CPTII,HCNC,S$GLB, | Performed by: UROLOGY

## 2025-07-17 PROCEDURE — 3061F NEG MICROALBUMINURIA REV: CPT | Mod: CPTII,HCNC,S$GLB, | Performed by: UROLOGY

## 2025-07-17 PROCEDURE — 3288F FALL RISK ASSESSMENT DOCD: CPT | Mod: CPTII,HCNC,S$GLB, | Performed by: UROLOGY

## 2025-07-17 PROCEDURE — 3044F HG A1C LEVEL LT 7.0%: CPT | Mod: CPTII,HCNC,S$GLB, | Performed by: UROLOGY

## 2025-07-17 PROCEDURE — 1101F PT FALLS ASSESS-DOCD LE1/YR: CPT | Mod: CPTII,HCNC,S$GLB, | Performed by: UROLOGY

## 2025-07-17 PROCEDURE — 4010F ACE/ARB THERAPY RXD/TAKEN: CPT | Mod: CPTII,HCNC,S$GLB, | Performed by: UROLOGY

## 2025-07-17 PROCEDURE — 1159F MED LIST DOCD IN RCRD: CPT | Mod: CPTII,HCNC,S$GLB, | Performed by: UROLOGY

## 2025-07-17 PROCEDURE — 99999 PR PBB SHADOW E&M-EST. PATIENT-LVL III: CPT | Mod: PBBFAC,HCNC,, | Performed by: UROLOGY

## 2025-07-17 PROCEDURE — 3066F NEPHROPATHY DOC TX: CPT | Mod: CPTII,HCNC,S$GLB, | Performed by: UROLOGY

## 2025-07-17 PROCEDURE — 3008F BODY MASS INDEX DOCD: CPT | Mod: CPTII,HCNC,S$GLB, | Performed by: UROLOGY

## 2025-07-17 PROCEDURE — 1126F AMNT PAIN NOTED NONE PRSNT: CPT | Mod: CPTII,HCNC,S$GLB, | Performed by: UROLOGY

## 2025-07-17 PROCEDURE — 81003 URINALYSIS AUTO W/O SCOPE: CPT | Mod: QW,HCNC,S$GLB, | Performed by: UROLOGY

## 2025-07-17 PROCEDURE — G2211 COMPLEX E/M VISIT ADD ON: HCPCS | Mod: HCNC,S$GLB,, | Performed by: UROLOGY

## 2025-07-17 NOTE — PROGRESS NOTES
Formerly [facility], later [facility], formerly known as [facility]  Group MD's:Mary/Fanny/Micha/Leslie/Jyotsna  Group NP's: Francoise Rubin, Briseida Zapata    PCP: Lois Arevalo FNP-C  Date of Service: 07/17/2025  Today's note written by: MD Mary       Chief Complaint: prostate cancer     Subjective:        HPI: Marcus Parisi is a 64 y.o. male presents with  Prostate cancer, T2c Gl3+3, T2c psa 2.9, NxMx prostate nodules, B apex and MH.  He was initially seen by me in 2018 for rising psa.   He was found to have Gl 3+3 in 4/14 cores Jan 2019 and was referred due to insurance issues to .    took him for a RALP on 7/1/20. Gl 4+3 with teriary 5 in <5%. Tumor involves 20 to 25% of prostate. Right apical and Left posterior margins involved by cancer.  Also had a h/o stones 10+ years ago. Had a stent but leaked continuously   9/11/21 psa at [facility] <0.1  10/27/21: Saw francoise on 10/27/21 and was c/o gross hematuria. CTU showed mid to distal L ureteral 3mm stone. Pt wanted it removed.     Interval history 11/3/21  He is here in preop and says he hasn't had any hematuria in 3 to 4 days. He's had no flank pain in 3 weeks. He never received flomax ([facility]). He also has not passed stone.  Prostate cancer- still has some incontinence  He was discharged on flomax with a plan for a repeat ctrss     Interval history 11/10/21:  ctrss 11/8/21 showed L ureteral 4mm stone in same position with moderate hydro, no left renal stones. 2, 2mm stones in right kidney. Long bladder . Therefore here today for cystoscopy and ureteroscopy to extract stone as long as no infection beyond stone   psa is 0.03 (no previous super sensitive psa's to go by)  Still with LUIS ENRIQUE        Interval history 2/8/22:  11/10/21 cysto L urs and stone extraction. Stone was not radioopaque. Inflammation around stone. Found to have BNC. Left stent on strings. Stones mostly calcium oxalate.   1/25/22 psa up to  0.04  rbus 2/3/22: 8mm stone on L wo hydro. No stone on right  kub 2/3/22: no stones seen   Hasn't passed any stones. Didn't notice much diffference after procedure  (no increased flow) and denies any slow flow. Denies any frequency or ugency. Still has LUIS ENRIQUE.     Interval history since last visit with me:  Uroflow results (date: 02/16/2022): Voiding time: 21.1s, Flow time: 17.2s, TTP flow: 9.3s, Peak flowrate: 24.1 mL/s, Average flowrate: 13.5mL/s, Intervals: 2, Voided volume: 233 mL, Pvr by bladder scan: 0mL . Pattern of curve: see uploaded image, reviewed by     5/17/122 0.04    5/24/22: He apparently is using cialis 20mg daily. Should not be doing this daily. Not interested in LAURA. Not a good candidate for inection. On eliquis.     Interval history 11/29/22:  Had a psa done on 11/22/22 and was 0.05. still urinating well with no issues.   Using cialis as needed now, not daily. Doing well on this.   Ua today with tr bld but no uti sx. No flank pain.     Interval history 3/6/23:  He returns today with a repeat PSA done on 02/28/2023, up to 0.07 now.  He also had a kidney ultrasound on 02/28/2023 showing one  6 mm stone in each kidney, KUB (Xray of the abdomen to look for stones) 02/28/2023 showing no stones  Voided urine today shows trace blood.  Denies any flank pain or gross hematuria.  No back pain.  Stays active.on cialis as needed.   History of bladder neck contracture but denies any difficulty urinating.  Good flow.  He says that he has been having stress incontinence since his prostatectomy however his nighttime incontinence has improved significantly.  Incontinence most noticeable with strenuous activity.  Wears 1 depends or liner during the day.  nighttime depends now dry. Only urinates 3x during the day. Does do kegels.         Interval history 6/20/23:  Here today to discuss his PSA.  We have been monitoring it since it is slowly rising.  PSA up to 0.09.  0.02 higher than 3 months ago.  Has not had any  back pain.  Also of note his urine today shows large blood.  Last time it only showed trace blood.  He denies any gross hematuria.  He is on Eliquis.  Denies any flank pain.  Plan was to get an x-ray in February.    Interval history 7/10/23:  6/20/23 ua showed large blood, 20 rbc- scheduled him for ctu and cysto.  6/27/23 called c/o uti sx. Urine sent for u/a, urine culture and urine cytology.   6/27/23 cytology neg  Ucx 6/27/23: e.coli. ua with 3+leuk/nit+/3+bld, ua neil - 20 rbc/>100 wbc/mod bact, tx with cipro bid x 10d. Finished a few days ago.   Had another psa on 7/1/23 and now up to 0.14  Ctu 7/1/23 for uti.   1. Cholelithiasis.- gallstones, see pcp or GI if having pain in right upper quadrant, nausea or vomiting  2. Splenomegaly.- large spleen, he can see pcp for this  3. Small nonobstructing right renal calculi.- tiny stones on right can cause blood in urine  4. Small subcentimeter renal cysts.- nothing to do for this      Cystoscopy on 7/10/23: no stricture and no tumors    Interval history by ME in CLINIC on 9/21/213 for prostate cancer fu and stone history:  Today he staesGood stream (does have a h/o bladder neck contracture). Pvr: 32  Independent Review of Ctu 7/1/23 again today:  shows 4 tiny stones on right, non obstructing. No ureteral cyst. Cystic lesion in the pelvis, stable. .  Small chronic stable 3.2 cm cystic lesion of the left paramedian pelvis at the level of the pubic symphysis again slightly displacing the bladder.            Psa 9/14/23 back down to 0.10 (could have been elevated due prostatitis.)  Ua today with tr bld    Interval history by ME/ in CLINIC on 3/21/24 for prostate cancer, kidney stones, ED:  Kidney stones- 4 tiny right stones. Denies any R flank pain. No GH. Ua today with r bld only.   BNC with slow flow:  at last visit told him he could stop the flomax if he wants to see if it made a difference but he said that he didn't want to discontinue bc he felt like it  had helped when he first started and doesn' mind taking.   ED: at last visit was on cialis 5mg daily and that's what my plan had said. He said that I had told him not to take it every day. On his med list he has 20mg. He said that he was taking cialis once daily up until a month ago and he ran out.  H/o Prostate cancer, Gl 4+3 with tertiary 5 in <5% s/p ralp in 7/2020 with + margins.  Recent psa rise likely due to prostatitis.  Psa ginny by 0.02 over 6 months. Previously was rising 0.02 every 3 months.     Interval history by ME/ in CLINIC on 9/26/24:  H/o Prostate cancer, Gl 4+3 with tertiary 5 in <5% s/p ralp in 7/2020 with + margins.    Recent PSA on 09/1924 up to 0.14.  Successive rises over the last year.  It was as high as 0.14 a year ago however.  Kidney stones- 4 tiny right stonesHe has not passed any stones in the last 6 months.  He did do an ultrasound on 9/19 and it showed bilateral 6 mm stones without hydro.  The x-ray did not clearly show any stones.  His voided urine today shows trace blood.  He denies any gross hematuria flank pain  Ed- At his last visit I started him on tadalafil 5 mg daily and 20 mg as needed.  He has been doing this regimen and feels like this works for him if he just uses the 5 mg daily does not have good response  I also discontinued his Flomax at his last visit because he no longer has prostate and he has not noticed any difference in his urinary symptoms in his PVR today is 0.  He is urinating without any issue with a good flow    Interval history by ME/ in CLINIC on 3/27/25:  Mr. Parisi has a history of prostate cancer status post resection in 2020 with positive margins. His PSA values have been rising, increasing from 0.12 to 0.14, and now to 0.21. He reports no pain or symptoms related to his prostate cancer. He has erectile dysfunction, for which he takes tadalafil 5 mg daily and occasionally uses 20 mg as needed, approximately once or twice weekly,  sometimes less frequently. He has a history of kidney stones, with 4 stones noted on his right kidney in a previous CT urogram from July 2023. He denies passing any stones or related pain. He also has a history of microhematuria, though he reports not seeing any visible blood in his urine. Today's urine test shows moderate blood in his urine, which is more than previously noted.  He denies seeing blood in his urine, flank pain, or passing any kidney stones. He denies any pain related to his prostate cancer or kidney stones.         IInterval history by ME/ - CLINIC virtual visit (AUDIO ONLY) on 5/20/25:  Mr. Parisi's PSA levels have been rising, with the most recent value at 0.25. A PET scan showed a potential spot in his spine and radiotracer activity in the prostatectomy bed. A follow-up MRI showed no mass in the spine consistent with tumor metastasis. He has a history of prostate cancer for which he underwent a prostatectomy. His wife mentions that he has a history of atrial fibrillation (A-fib), discovered during pre-operative evaluation for his prostate cancer surgery. A loop recorder was implanted in his chest to monitor for A-fib, but it has never detected an episode. 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 A recent urinalysis showed blood on the dipstick, but the microscopic exam was normal.      My notes:  psma scan 4/23/25 was inconclusive. Might be some recurrence at prostate bed and spine. The spine could also just be non-spcific and so they recommended an L spine to see if anything correlates to this finding.L spine done 5/8 showed no mass concerning for metastatic cancer.   5/8/25 psa higher at  0.25    Psma pet scan 4/23/25  Ill-defined radiotracer activity involving the lower lumbar spine most pronounced at L5.  This is nonspecific and could be artifactual in nature as there is no CT correlate.  Consider  lumbar spine MRI follow up.  Ill-defined radiotracer activity at the prostatectomy bed.  No other evidence of PSMA avid metastatic disease    L spine 5/8/25  1. Advanced multilevel degenerative changes of the lumbar spine with bulky bridging anterior marginal osteophyte formation, disc degeneration and moderate to severe facet arthropathy, as described in detail above, most pronounced at the L2-3 and L3-4 levels and resulting in severe neural foraminal and severe spinal canal stenosis.  2. Mild disc edema and reactive enhancement at the L1-2 and L4-5 disc levels, presumably degenerative, noting no associated destructive endplate changes, paraspinous fluid collections, or other secondary features to suggest discitis.  3. Modic type 1 endplate changes and associated ill-defined paraspinous edema and enhancement about bulky left anterolateral marginal osteophytes at L3-4, most likely degenerative/reactive.  4. No discrete enhancing intraspinal mass or evidence of an aggressive marrow replacement process, specifically at the L5 vertebral body level.      Interval history by ME/ in CLINIC (In-person) on 7/17/25:  History of Present Illness          My notes:  Prostate cancer-  RP in 2020 w/ path findings: Gl 4+3 with teriary 5 in <5%. Tumor involves 20 to 25% of prostate. Right apical and Left posterior margins involved by cancer. Had rising psa. Psma showed +recurrence fossa. Saw rad on c on 5/23/25. They recommended salvage XRT w/o ADT since psa <0.5. he's had 21/39 treatments. Fatigue primary complaint. No problems urinating, good flow. Psa down to 0.18 on 7/10/25  On cialis 5mg daily for ed. Occ increases add'l dose  MH- ua today with small bld. Denies gh. Last ctu 7/1/23, cysto 7/10/23. On eliquis & no smoking hx.     psa history   7/10/25 0.18  6/13/25 Started xrt  5/8/25  0.25  3/27/25 0.21  9/26/24 0.14  3/11/24  0.12  9/21/23 Pvr:  32  9/14/23 0.10  7/1/23  0.14  6/6/23  0.09  2/28/23 0.07  11/22/22 0.05  5/24/22  pvr by scan: 11  5/17/22 0.04   2/8/22  pvr by scan: 0  1/25/22 0.04  11/8/21 0.03  9/11/21 <0.1   7/31/20 T3a RALP Gl 4+3 with teriary 5 in <5%. Tumor involves 20 to 25% of prostate.   1/28/19  psa 2.9, trus vol: 40.5g, T2c (b palp nodules), Gl 3+3 in RBM, LML, LONDONO, LTZ (4/14).  HGPIN in  RBL, RBM (2/14). ASAP in SHELLY (1/14)  01/14/19          2.9, %free 15.5, JAHAIRA: 20g, definite nodule on left apex and one on right apex.   10/9/18            JAHAIRA: 20g gland without any discrete masses, left side however more firm, no tenderness.  9/18/18            2.1  9/2017             1.2      Urine history: eliquis for afib. No tobacco hx. + stones  7/17/25 Small bld  3/27/25 Mod blood- 0 rbc/cyt: neg  12/11/24 neg  09/26/2024 trace blood.   9/21/23 Tr bld  7/10/23 Neg  6/27/23 E.coli, 3+bld/nit+/3+leuk, 20 rbc/>100 wbc/mod bacter  6/20/23 Large bld, 20 rbc/few bact  3/6/23  Tr bld  5/24/22 Tr bld  02/8/22 neg  11/10/21 90%calcium oxalate/10% calcium phosphate  10/27/21 Ng, void: red, 3+bld, >100 rbc, cyt: neg.   6/19/21 coreen  1/28/19 Ng, void: tr wbc- no sx of uti  1/21/19 ng, void: neg  1/15/19 No cx, void: tr blood, cytology: negative  10/9/18            No cx, void: neg       REVIEW OF SYSTEMS:neg  Objective:      There were no vitals filed for this visit.              Assessment:         Marcus Parisi is a 70 y.o. male with     1. Prostate cancer    2. Microhematuria             Plan:     's typed/written- Abbreviated/Short Plan:  Rising psa s/p RalP 7/31/20- PSMA scan showed spot in spine and prostate fossa. Started salvage xrt 6/2025.   F/u in 6 months- psa diagnostic standing orders placed  ED- continue tadalafil 5mg daily and 20mg prn   MH and h/o R renal stones - neg w/u 2023 except for stones.  Rbus and kub in 6 months to monitor R renal stones     F/u in 6 months- psa diagnostic standing orders placed, rbus and kub prior          The following assessment plan was created by [employer] via ambient listening:  Assessment & Plan                  Portions of this note was generated with the assistance of ambient listening technology. Verbal consent was obtained by the patient and accompanying visitor(s) for the recording of patient appointment to facilitate this note. I attest to having reviewed and edited the generated note for accuracy, though some syntax or spelling errors may persist. Please contact the author of this note for any clarification.    Visit today included increased complexity associated with the care of the episodic problem addressed and managing the longitudinal care of the patient due to the serious and/or complex managed problem(s)                             Generally speaking, PSMA PET Scans using PSMA (18F-[id number]) have been shown to be effective at PSA levels above 0.2ng/mL. Also generally speaking, the higher the PSA level (when restaging patients), the more likely a PSMA PET Scan is to detect and identify recurrent prostate cancer.  psa diagnostic in 6 months and f/u after (standing orders in).   But if psa rising, will order prostate mri to eval for any local recurrence at sites of positive margins.         PSA doubling time info:  https://www.mdcalc.com/calc/55404/psa-doubling-time-psadt-calculator#evidence  https://jamanetwork.com/journals/kristin/fullarticle/774400  The length of time after surgery prior to biochemical recurrence was important in determining the risk of eventual distant failure for men with lower (5, 6, and 7) and men with high (8, 9, and 10) Maricarmen scores (Figure 4). A similar observation was made previously by Rahul et al22 in a report demonstrating that a high Maricarmen score and advanced pathologic stage were important in determining the likelihood of local or distant failure. Using a cutoff of 10 months, PSADT provided further substratification for men with a Una score of less than 8.  Men with rapid PSA level elevation (<2 years), a Weatherford score of 5 to 7, and a PSADT (>10 months) demonstrated a 76% probability of remaining free of metastatic disease for 5 years following initial PSA level elevation compared with men with a shorter PSADT (<10 months) who had only 35% chance of remaining free of metastatic disease for 5 years after biochemical recurrence. Although not as strong as Maricarmen score, time of biochemical recurrence, and PSADT, the pathologic stage did contribute to the likelihood of distant metastasis (P=.01). The PSADT has been suggested by Kong et al13 as a useful predictor of the type of eventual recurrence after radical prostatectomy. They measured the PSADT for a group of 77 men with biochemical recurrence following radical retropubic prostatectomy and found that shorter PSADTs (<6 months) were more indicative of distant disease when compared with local recurrence.

## 2025-07-22 ENCOUNTER — TREATMENT (OUTPATIENT)
Dept: RADIATION ONCOLOGY | Facility: CLINIC | Age: 70
End: 2025-07-22
Payer: MEDICARE

## 2025-07-22 PROCEDURE — G6015 RADIATION TX DELIVERY IMRT: HCPCS | Mod: S$GLB,,, | Performed by: RADIOLOGY

## 2025-07-22 PROCEDURE — 77014 PR  CT GUIDANCE PLACEMENT RAD THERAPY FIELDS: CPT | Mod: S$GLB,,, | Performed by: RADIOLOGY

## 2025-08-05 ENCOUNTER — CLINICAL SUPPORT (OUTPATIENT)
Dept: RADIATION ONCOLOGY | Facility: CLINIC | Age: 70
End: 2025-08-05
Payer: MEDICARE

## 2025-08-05 ENCOUNTER — SOCIAL WORK (OUTPATIENT)
Dept: HEMATOLOGY/ONCOLOGY | Facility: CLINIC | Age: 70
End: 2025-08-05
Payer: MEDICARE

## 2025-08-05 VITALS — WEIGHT: 280 LBS | BODY MASS INDEX: 41.35 KG/M2

## 2025-08-05 DIAGNOSIS — C61 PROSTATE CANCER: Primary | ICD-10-CM

## 2025-08-05 NOTE — PROGRESS NOTES
Completed 38 of 39 daily radiation treatments.    Pain Score 0  Last Distress Score 6/13/2025 0    Updated Distress Score 8/5/2025 0  Falls since last visit no     Fall Risk no  New Problems: no new problems, continues to have fatigue.  Education: Reinforced for patient to take naps as needed and rest when tired.  Patient instructed to call for any problems and that will f/u at 3 weeks post treatment.  Understanding verbalized.    All questions and concerns addressed by MD at visit.

## 2025-08-06 ENCOUNTER — TREATMENT (OUTPATIENT)
Dept: RADIATION ONCOLOGY | Facility: CLINIC | Age: 70
End: 2025-08-06
Payer: MEDICARE

## 2025-08-06 PROCEDURE — 77014 PR  CT GUIDANCE PLACEMENT RAD THERAPY FIELDS: CPT | Mod: S$GLB,,, | Performed by: RADIOLOGY

## 2025-08-06 PROCEDURE — G6015 RADIATION TX DELIVERY IMRT: HCPCS | Mod: S$GLB,,, | Performed by: RADIOLOGY

## 2025-08-07 PROCEDURE — 77336 RADIATION PHYSICS CONSULT: CPT | Mod: S$GLB,,, | Performed by: RADIOLOGY

## 2025-08-26 ENCOUNTER — CLINICAL SUPPORT (OUTPATIENT)
Dept: RADIATION ONCOLOGY | Facility: CLINIC | Age: 70
End: 2025-08-26
Payer: MEDICARE

## 2025-08-26 DIAGNOSIS — C61 LOCAL RECURRENCE OF PROSTATE CANCER: Primary | ICD-10-CM

## 2025-08-26 PROCEDURE — 99499 UNLISTED E&M SERVICE: CPT | Mod: ,,, | Performed by: RADIOLOGY

## 2025-08-27 DIAGNOSIS — C61 PROSTATE CANCER: Primary | ICD-10-CM

## 2025-09-04 DIAGNOSIS — C61 PROSTATE CANCER: Primary | ICD-10-CM

## (undated) DEVICE — SLEEVE SCD EXPRESS KNEE MEDIUM

## (undated) DEVICE — SCRUB HIBICLENS 4% CHG 4OZ

## (undated) DEVICE — SET CYSTO IRRIGATION UNIV SPIK

## (undated) DEVICE — SET IRR URLGY 2LINE UNIV SPIKE

## (undated) DEVICE — SEE MEDLINE ITEM 157116

## (undated) DEVICE — GLOVE SURG ULTRA TOUCH 6

## (undated) DEVICE — COVER TRANSDUCER LATEX N/STERI

## (undated) DEVICE — NDL SPINAL 22GX7 SPINOCAN

## (undated) DEVICE — SET CYSTO IRR DRP CHMBR 84IN

## (undated) DEVICE — SEE ITEM #152308

## (undated) DEVICE — BOWL STERILE LARGE 32OZ

## (undated) DEVICE — SPONGE BULKEE II ABSRB 6X6.75

## (undated) DEVICE — SOL 9P NACL IRR PIC IL

## (undated) DEVICE — EXTRACTOR TIPLESS 3FR 115 CM

## (undated) DEVICE — SHEET DRAPE MEDIUM

## (undated) DEVICE — WATER STERILE INJ 500ML BAG

## (undated) DEVICE — SYR 10CC LUER LOCK

## (undated) DEVICE — FIBER LASER HOLMIUM 365 MICRON

## (undated) DEVICE — CONTAINER SPECIMEN OR STER 4OZ

## (undated) DEVICE — LUBRICANT SURGILUBE 2 OZ

## (undated) DEVICE — SYR ONLY LUER LOCK 20CC

## (undated) DEVICE — SWABSTICK PVP-1 SINGLE 10%

## (undated) DEVICE — SEE MEDLINE ITEM 152651

## (undated) DEVICE — SOL IRR NACL .9% 3000ML

## (undated) DEVICE — JELLY SURGILUBE LUBE TUBE 2OZ

## (undated) DEVICE — SEE MEDLINE ITEM 157185

## (undated) DEVICE — BAG LINGEMAN DRAIN UROLOGY

## (undated) DEVICE — GUIDE WIRE MOTION .035 X 150CM

## (undated) DEVICE — CATH POLLACK OPEN-END FLEXI-TI

## (undated) DEVICE — GUN BIOPSY 18GA MONOPLY

## (undated) DEVICE — SOL STERILE WATER INJ 1000ML

## (undated) DEVICE — DRAPE MEDIUM SHEET 40X70IN

## (undated) DEVICE — GUIDE BIOPSY BIPLANAR 18G

## (undated) DEVICE — WIRE GD LUB STD 3CM .035 150CM

## (undated) DEVICE — GLOVE 6.0 PROTEXIS PI BLUE

## (undated) DEVICE — NDL HYPODERMIC BLUNT 18G 1.5IN